# Patient Record
Sex: MALE | Race: WHITE | NOT HISPANIC OR LATINO | Employment: OTHER | URBAN - METROPOLITAN AREA
[De-identification: names, ages, dates, MRNs, and addresses within clinical notes are randomized per-mention and may not be internally consistent; named-entity substitution may affect disease eponyms.]

---

## 2017-01-04 ENCOUNTER — RADIANT APPOINTMENT (OUTPATIENT)
Dept: CT IMAGING | Facility: CLINIC | Age: 67
End: 2017-01-04
Payer: COMMERCIAL

## 2017-01-04 ENCOUNTER — OFFICE VISIT (OUTPATIENT)
Dept: FAMILY MEDICINE | Facility: CLINIC | Age: 67
End: 2017-01-04
Payer: COMMERCIAL

## 2017-01-04 VITALS
OXYGEN SATURATION: 96 % | RESPIRATION RATE: 12 BRPM | TEMPERATURE: 98.3 F | HEIGHT: 71 IN | BODY MASS INDEX: 32.13 KG/M2 | HEART RATE: 61 BPM | DIASTOLIC BLOOD PRESSURE: 70 MMHG | WEIGHT: 229.5 LBS | SYSTOLIC BLOOD PRESSURE: 137 MMHG

## 2017-01-04 DIAGNOSIS — R10.9 FLANK PAIN: Primary | ICD-10-CM

## 2017-01-04 DIAGNOSIS — R10.9 FLANK PAIN: ICD-10-CM

## 2017-01-04 DIAGNOSIS — Z11.59 NEED FOR HEPATITIS C SCREENING TEST: ICD-10-CM

## 2017-01-04 DIAGNOSIS — B02.9 HERPES ZOSTER WITHOUT COMPLICATION: ICD-10-CM

## 2017-01-04 LAB
ALBUMIN UR-MCNC: NEGATIVE MG/DL
APPEARANCE UR: CLEAR
BILIRUB UR QL STRIP: NEGATIVE
COLOR UR AUTO: YELLOW
GLUCOSE UR STRIP-MCNC: NEGATIVE MG/DL
HGB UR QL STRIP: NEGATIVE
KETONES UR STRIP-MCNC: NEGATIVE MG/DL
LEUKOCYTE ESTERASE UR QL STRIP: NEGATIVE
NITRATE UR QL: NEGATIVE
PH UR STRIP: 5.5 PH (ref 5–7)
SP GR UR STRIP: 1.02 (ref 1–1.03)
URN SPEC COLLECT METH UR: NORMAL
UROBILINOGEN UR STRIP-ACNC: 0.2 EU/DL (ref 0.2–1)

## 2017-01-04 PROCEDURE — 81003 URINALYSIS AUTO W/O SCOPE: CPT | Performed by: PHYSICIAN ASSISTANT

## 2017-01-04 PROCEDURE — 99214 OFFICE O/P EST MOD 30 MIN: CPT | Performed by: PHYSICIAN ASSISTANT

## 2017-01-04 PROCEDURE — 74176 CT ABD & PELVIS W/O CONTRAST: CPT | Mod: TC

## 2017-01-04 RX ORDER — HYDROCODONE BITARTRATE AND ACETAMINOPHEN 5; 325 MG/1; MG/1
1 TABLET ORAL EVERY 6 HOURS PRN
Qty: 15 TABLET | Refills: 0 | Status: SHIPPED | OUTPATIENT
Start: 2017-01-04 | End: 2017-02-22

## 2017-01-04 RX ORDER — KETOROLAC TROMETHAMINE 30 MG/ML
60 INJECTION, SOLUTION INTRAMUSCULAR; INTRAVENOUS ONCE
Qty: 2 ML | Refills: 0 | OUTPATIENT
Start: 2017-01-04 | End: 2017-01-04

## 2017-01-04 RX ORDER — VALACYCLOVIR HYDROCHLORIDE 1 G/1
1000 TABLET, FILM COATED ORAL 3 TIMES DAILY
Qty: 21 TABLET | Refills: 0 | Status: SHIPPED | OUTPATIENT
Start: 2017-01-04 | End: 2017-02-22

## 2017-01-04 NOTE — NURSING NOTE
The following medication was given:     MEDICATION: Ketorolac Tromethamine 60MG/2ML (30 mg/mL) (Toradol)  ROUTE: IM  SITE: [facility]  DOSE: 2ml  LOT #: [id number]  :  Jonathan Weber   EXPIRATION DATE:  06/01/2018  NDC#: 72939-477-10    Given at 10:35am

## 2017-01-04 NOTE — NURSING NOTE
"Chief Complaint   Patient presents with     Back Pain     Derm Problem       Initial /70 mmHg  Pulse 61  Temp(Src) 98.3  F (36.8  C) (Oral)  Resp 12  Ht 5' 11\" (1.803 m)  Wt 229 lb 8 oz (104.101 kg)  BMI 32.02 kg/m2  SpO2 96% Estimated body mass index is 32.02 kg/(m^2) as calculated from the following:    Height as of this encounter: 5' 11\" (1.803 m).    Weight as of this encounter: 229 lb 8 oz (104.101 kg).  BP completed using cuff size: vinay Buck CMA      "

## 2017-01-04 NOTE — PROGRESS NOTES
Quick Note:    Mr. Sandhu,    Your CT test was negative for an acute kidney stone.    Please contact the clinic if you have additional questions or if symptoms persist. Thank you.    Sincerely,    Paxton Burch       ______

## 2017-01-04 NOTE — PROGRESS NOTES
I spoke with patient over the phone about results and reviewed dermatomes,: it is also possible his pain is related to his shingles, which is in a L3/L4 pattern    Nick Burch PA-C

## 2017-01-04 NOTE — PROGRESS NOTES
SUBJECTIVE:                                                    Rick Sandhu is a 67 year old male who presents to clinic today for the following health issues:      Rash     Onset: 1 day ago      Description:   Location: right knee   Character: round, red  Itching (Pruritis): YES    Progression of Symptoms:  worsening    Accompanying Signs & Symptoms:  Fever: no   Body aches or joint pain: no   Sore throat symptoms: no   Recent cold symptoms: no    History:   Previous similar rash: no     Precipitating factors:   Exposure to similar rash: no   New exposures: None   Recent travel: no     Alleviating factors:  Nothing      Therapies Tried and outcome: nothing     Concern - right low back      Onset: 3 days ago      Description:  Sharp pain in low right back , very low down by glute, feels like prev kidney stone        Intensity: 8/10    Progression of Symptoms:  worsening and intermittent    Accompanying Signs & Symptoms:  No pain when urinating or no blood in urine   Unable to sleep due to pain      Previous history of similar problem:   Yes had a kidney stone about 16 years ago     Precipitating factors:   Worsened by: nothing     Alleviating factors:  Improved by: noting        Therapies Tried and outcome: aleve , aspirin   Doesn't check sugars               Allergies   Allergen Reactions     Penicillin G        Past Medical History   Diagnosis Date     Sarcoidosis (H) 2/96     Hypertension goal BP (blood pressure) < 140/90 9/1/2011     Type 2 diabetes, HbA1C goal < 8% (H) 10/20/2010     Hyperlipidemia LDL goal <100 10/20/2010         Current Outpatient Prescriptions on File Prior to Visit:  metFORMIN (GLUCOPHAGE) 500 MG tablet Take 1 tablet by mouth twice daily with meals   glipiZIDE (GLUCOTROL XL) 5 MG 24 hr tablet Take 1 tablet (5 mg) by mouth daily   lisinopril-hydrochlorothiazide (PRINZIDE,ZESTORETIC) 20-25 MG per tablet Take 1 tablet by mouth daily   atorvastatin (LIPITOR) 10 MG tablet Take 1 tablet (10  "mg) by mouth daily   Fiber Diet TABS Take 2 tablets by mouth daily    ASPIRIN 81 MG PO TABS 1 TABLET DAILY (*)   melatonin 3 MG tablet Take 3 mg by mouth nightly as needed.     No current facility-administered medications on file prior to visit.    Past Surgical History   Procedure Laterality Date     C appendectomy       Vasectomy  1982     Rotator cuff repair rt/lt  9/06     Left     Rotator cuff repair rt/lt  2008     Right     Rotator cuff repair rt/lt  2010     Right - redo     Other surgical history Left      Colonoscopy with co2 insufflation N/A 8/23/2016     Procedure: COLONOSCOPY WITH CO2 INSUFFLATION;  Surgeon: Duane, William Charles, MD;  Location:  OR       Social History     Social History     Marital Status:      Spouse Name: N/A     Number of Children: N/A     Years of Education: N/A     Occupational History     Not on file.     Social History Main Topics     Smoking status: Former Smoker     Quit date: 01/06/1986     Smokeless tobacco: Not on file     Alcohol Use: 0.0 oz/week     0 Standard drinks or equivalent per week      Comment: 1-2 drinks every other week     Drug Use: No     Sexual Activity: Yes     Other Topics Concern     Not on file     Social History Narrative       REVIEW OF SYSTEMS  General: negative for fever  SKIn as above  : negative for dysuria , incontinence, frequency  Musculoskeletal: as above  Neurologic: negative for ataxia, saddle anesthesia, fecal incontinence, one sided weakness,  paresthesias    Physical Exam:  Vitals: /70 mmHg  Pulse 61  Temp(Src) 98.3  F (36.8  C) (Oral)  Resp 12  Ht 5' 11\" (1.803 m)  Wt 229 lb 8 oz (104.101 kg)  BMI 32.02 kg/m2  SpO2 96%  BMI= Body mass index is 32.02 kg/(m^2).  Constitutional: healthy, alert and no acute distress   CARDIO: RRR, no MRG  RESP: lungs CTA, NAD  NEURO: Patellar reflexes intact and equal b/l  BACK:  Straight leg raise intact, + paraspinal muscle TTP just above rt hip laterally, strength intact and equal " b/l lower extremities, no CVAT,  ABD_ soft, nonttpo   GAIT: intact  Psychiatric: mentation appears normal and affect normal/bright  Skin_ rt medial mid leg with two patches of tiny vesicles on red bases      Impression:   UA WNL, stone vs back pain . CT shows nonobsctructive stone on rt, no obvious evidence for stone on CT    ICD-10-CM    1. Flank pain R10.9 ketorolac (TORADOL) 60 MG/2ML SOLN injection     UA reflex to Microscopic and Culture     CT Abdomen Pelvis w/o Contrast     HYDROcodone-acetaminophen (NORCO) 5-325 MG per tablet     CANCELED: UA reflex to Microscopic and Culture   2. Herpes zoster without complication B02.9 valACYclovir (VALTREX) 1000 mg tablet   3. Need for hepatitis C screening test Z11.59 Hepatitis C Screen Reflex to HCV RNA Quant and Genotype     CANCELED: Hepatitis C Screen Reflex to HCV RNA Quant and Genotype       Plan:  Instructions for back care and return precautions discussed.        Nick Burch PA-C

## 2017-01-04 NOTE — PATIENT INSTRUCTIONS
Shingles  Shingles is a viral infection caused by the same virus as chicken pox. Anyone who has had chicken pox may get shingles later in life. The virus stays in the body, but remains dormant (asleep). Shingles often occurs in older persons or persons with lowered immunity. But it can affect anyone at any age.  Shingles starts as a tingling patch of skin on one side of the body. Small, painful blisters may then appear. The rash does not spread to the rest of the body.  Exposure to shingles cannot cause shingles. However, it can cause chicken pox in anyone who has not had chicken pox or has not been vaccinated. The contagious period ends when all blisters have crusted over (generally about 2 weeks after the illness begins).  After the blisters heal, the affected skin may be sensitive or painful for months (neuralgia). This often gradually goes away.  A shingles vaccine is available. This can help prevent shingles or make it less painful. It is generally recommended for adults over the age of 60 who have had chicken pox in the past, but who have never had shingles. Adults over 60 who have had neither chicken pox nor shingles can prevent both diseases with the chicken pox vaccine. Ask your healthcare provider about these vaccines.  Home care    Medicines may be prescribed to help relieve pain. Take these medicines as directed. Ask your healthcare provider or pharmacist before using over-the-counter medicines for helping treat pain and itching.     In certain cases, antiviral medicines may be prescribed to reduce pain, shorten the illness, and prevent neuralgia. Take these medicines as directed.    Compresses made from a solution of cool water mixed with cornstarch or baking soda may help relieve pain and itching.     Gently wash skin daily with soap and water to help prevent infection.  Be certain to rinse off all of the soap, which can be irritating.    Trim fingernails and try not to scratch. Scratching the sores  may leave scars.    Stay home from work or school until all blisters have formed a crust and you are no longer contagious.  Follow-up care  Follow up with your healthcare provider or as directed by our staff.  When to seek medical advice    Fever of 100.4 F (38 C) or higher, or as directed by your healthcare provider    Affected skin is on the face or neck and any of the following occur:                          Headache                          Eye pain                          Changes in vision                          Sores near the eye                          Weakness of facial muscles    Pain, redness, or swelling of a joint    Signs of skin infection: colored drainage from the sores, warmth, increasing redness, or increasing pain    1092-1429 [employer]. [address] [place], [place] [address]. All rights reserved. This information is not intended as a substitute for professional medical care. Always follow your healthcare professional's instructions.        Flank Pain, Uncertain Cause  The flank is the area between your upper abdomen and your back. Pain there is often caused by a problem with your kidneys. It might be a kidney infection or a kidney stone. Other causes of flank pain include spinal arthritis, a pinched nerve from a back injury, or a back muscle strain or spasm.  The cause of your flank pain is not certain. You may need other tests.  Home care  Follow these tips when caring for yourself at home:    You may use acetaminophen or ibuprofen to control pain, unless your health care provider prescribed another medicine Note: If you have chronic liver or kidney disease, talk with your provider before taking these medicines. Also talk with your provider first if you ve had a stomach ulcer or GI bleeding.    If the cause of your pain is coming from the muscles, you may get relief with ice or heat: Ice: During the first 2 days after the injury, put an ice pack on the painful area for 20  minutes every 2 to 4 hours. This will reduce swelling and pain. A hot shower, hot bath, or heating pad works well for muscle spasm. You can start with ice, then switch to heat after 2 days. You might find that alternating ice and heat works well. Use the method that feels the best to you.  Follow-up care  Follow up with your health care provider. if your symptoms don t get better over the next few days.  When to seek medical advice  Call your health care provider right away if any of these happen:    Repeated vomiting    Fever of 100.4 F (38 C) or higher, or as directed by your health care provider    Flank pain that gets worse    Pain that spreads to the front of your belly (abdomen)    Dizziness, weakness, or fainting    Blood in your urine    Burning feeling when you urinate or the need to urinate often    Pain in one of your legs that gets worse    Numbness or weakness in a leg    6588-5143 [employer]. [address] [place] [address], [place] [address]. All rights reserved. This information is not intended as a substitute for professional medical care. Always follow your healthcare professional's instructions.

## 2017-01-04 NOTE — MR AVS SNAPSHOT
After Visit Summary   1/4/2017    Rick Sandhu    MRN: [id number]           Patient Information     Date Of Birth          1950        Visit Information        Provider Department      1/4/2017 9:00 AM Paxton Burch PA [facility]        Today's Diagnoses     Flank pain    -  1     Herpes zoster without complication         Need for hepatitis C screening test           Care Instructions      Shingles  Shingles is a viral infection caused by the same virus as chicken pox. Anyone who has had chicken pox may get shingles later in life. The virus stays in the body, but remains dormant (asleep). Shingles often occurs in older persons or persons with lowered immunity. But it can affect anyone at any age.  Shingles starts as a tingling patch of skin on one side of the body. Small, painful blisters may then appear. The rash does not spread to the rest of the body.  Exposure to shingles cannot cause shingles. However, it can cause chicken pox in anyone who has not had chicken pox or has not been vaccinated. The contagious period ends when all blisters have crusted over (generally about 2 weeks after the illness begins).  After the blisters heal, the affected skin may be sensitive or painful for months (neuralgia). This often gradually goes away.  A shingles vaccine is available. This can help prevent shingles or make it less painful. It is generally recommended for adults over the age of 60 who have had chicken pox in the past, but who have never had shingles. Adults over 60 who have had neither chicken pox nor shingles can prevent both diseases with the chicken pox vaccine. Ask your healthcare provider about these vaccines.  Home care    Medicines may be prescribed to help relieve pain. Take these medicines as directed. Ask your healthcare provider or pharmacist before using over-the-counter medicines for helping treat pain and itching.     In certain cases, antiviral  medicines may be prescribed to reduce pain, shorten the illness, and prevent neuralgia. Take these medicines as directed.    Compresses made from a solution of cool water mixed with cornstarch or baking soda may help relieve pain and itching.     Gently wash skin daily with soap and water to help prevent infection.  Be certain to rinse off all of the soap, which can be irritating.    Trim fingernails and try not to scratch. Scratching the sores may leave scars.    Stay home from work or school until all blisters have formed a crust and you are no longer contagious.  Follow-up care  Follow up with your healthcare provider or as directed by our staff.  When to seek medical advice    Fever of 100.4 F (38 C) or higher, or as directed by your healthcare provider    Affected skin is on the face or neck and any of the following occur:                          Headache                          Eye pain                          Changes in vision                          Sores near the eye                          Weakness of facial muscles    Pain, redness, or swelling of a joint    Signs of skin infection: colored drainage from the sores, warmth, increasing redness, or increasing pain    8299-5316 [employer]. [address] [place] [address]. All rights reserved. This information is not intended as a substitute for professional medical care. Always follow your healthcare professional's instructions.        Flank Pain, Uncertain Cause  The flank is the area between your upper abdomen and your back. Pain there is often caused by a problem with your kidneys. It might be a kidney infection or a kidney stone. Other causes of flank pain include spinal arthritis, a pinched nerve from a back injury, or a back muscle strain or spasm.  The cause of your flank pain is not certain. You may need other tests.  Home care  Follow these tips when caring for yourself at home:    You may use acetaminophen or ibuprofen  to control pain, unless your health care provider prescribed another medicine Note: If you have chronic liver or kidney disease, talk with your provider before taking these medicines. Also talk with your provider first if you ve had a stomach ulcer or GI bleeding.    If the cause of your pain is coming from the muscles, you may get relief with ice or heat: Ice: During the first 2 days after the injury, put an ice pack on the painful area for 20 minutes every 2 to 4 hours. This will reduce swelling and pain. A hot shower, hot bath, or heating pad works well for muscle spasm. You can start with ice, then switch to heat after 2 days. You might find that alternating ice and heat works well. Use the method that feels the best to you.  Follow-up care  Follow up with your health care provider. if your symptoms don t get better over the next few days.  When to seek medical advice  Call your health care provider right away if any of these happen:    Repeated vomiting    Fever of 100.4 F (38 C) or higher, or as directed by your health care provider    Flank pain that gets worse    Pain that spreads to the front of your belly (abdomen)    Dizziness, weakness, or fainting    Blood in your urine    Burning feeling when you urinate or the need to urinate often    Pain in one of your legs that gets worse    Numbness or weakness in a leg    2712-2316 [employer]. [address] [place] [address], [place] [address]. All rights reserved. This information is not intended as a substitute for professional medical care. Always follow your healthcare professional's instructions.              Follow-ups after your visit        Follow-up notes from your care team     Return if symptoms worsen or fail to improve.      Your next 10 appointments already scheduled     Jan 04, 2017 10:45 AM   CT ABDOMEN PELVIS W/O CONTRAST with BECT1   [facility]    [address] [facility] [address]  "  [phone]           Please bring any scans or X-rays taken at other hospitals, if similar tests were done. Also bring a list of your medicines, including vitamins, minerals and over-the-counter drugs. It is safest to leave personal items at home.  Be sure to tell your doctor:   If you have any allergies.   If there s any chance you are pregnant.   If you are breastfeeding.   If you have any special needs.  You do not need to do anything special to prepare.  Please wear loose clothing, such as a sweat suit or jogging clothes. Avoid snaps, zippers and other metal. We may ask you to undress and put on a hospital gown.              Future tests that were ordered for you today     Open Future Orders        Priority Expected Expires Ordered    CT Abdomen Pelvis w/o Contrast Routine  1/4/2018 1/4/2017    Hepatitis C Screen Reflex to HCV RNA Quant and Genotype Routine  1/4/2018 1/4/2017            Who to contact     If you have questions or need follow up information about today's clinic visit or your schedule please contact [facility] directly at [phone].  Normal or non-critical lab and imaging results will be communicated to you by "[email or web address]hart, letter or phone within 4 business days after the clinic has received the results. If you do not hear from us within 7 days, please contact the clinic through [employer] or phone. If you have a critical or abnormal lab result, we will notify you by phone as soon as possible.  Submit refill requests through [employer] or call your pharmacy and they will forward the refill request to us. Please allow 3 business days for your refill to be completed.          Additional Information About Your Visit        [employer] Information     [employer] lets you send messages to your doctor, view your test results, renew your prescriptions, schedule appointments and more. To sign up, go to [email or web address] . Click on \"Log in\" on the left side of the screen, which will take " "you to the Welcome page. Then click on \"Sign up Now\" on the right side of the page.     You will be asked to enter the access code listed below, as well as some personal information. Please follow the directions to create your username and password.     Your access code is: [email or web address]  Expires: 2017 10:02 AM     Your access code will  in 90 days. If you need help or a new code, please call your [facility] clinic or [phone].        Care EveryWhere ID     This is your Care EveryWhere ID. This could be used by other organizations to access your [facility] medical records  [id number]        Your Vitals Were     Pulse Temperature Respirations Height BMI (Body Mass Index) Pulse Oximetry    61 98.3  F (36.8  C) (Oral) 12 5' 11\" (1.803 m) 32.02 kg/m2 96%       Blood Pressure from Last 3 Encounters:   17 137/70   16 142/72   16 121/71    Weight from Last 3 Encounters:   17 229 lb 8 oz (104.101 kg)   16 226 lb (102.513 kg)   16 220 lb (99.791 kg)              We Performed the Following     UA reflex to Microscopic and Culture          Today's Medication Changes          These changes are accurate as of: 17 10:02 AM.  If you have any questions, ask your nurse or doctor.               Start taking these medicines.        Dose/Directions    HYDROcodone-acetaminophen 5-325 MG per tablet   Commonly known as:  NORCO   Used for:  Flank pain   Started by:  Paxton Burch PA        Dose:  1 tablet   Take 1 tablet by mouth every 6 hours as needed for moderate to severe pain   Quantity:  15 tablet   Refills:  0       ketorolac 60 MG/2ML Soln injection   Commonly known as:  TORADOL   Used for:  Flank pain   Started by:  Paxton Burch PA        Dose:  60 mg   Inject 2 mLs (60 mg) into the muscle once for 1 dose In clinic IM INJ   Quantity:  2 mL   Refills:  0       valACYclovir 1000 mg tablet   Commonly known as:  VALTREX   Used for:  Herpes zoster without " complication   Started by:  Paxton Burch PA        Dose:  1000 mg   Take 1 tablet (1,000 mg) by mouth 3 times daily   Quantity:  21 tablet   Refills:  0            Where to get your medicines      These medications were sent to [facility] [address] [facility] [place] - [address] [facility]  [address] [place] [address]     Phone:  [phone]    - valACYclovir 1000 mg tablet      Some of these will need a paper prescription and others can be bought over the counter.  Ask your nurse if you have questions.     Bring a paper prescription for each of these medications    - HYDROcodone-acetaminophen 5-325 MG per tablet    You don't need a prescription for these medications    - ketorolac 60 MG/2ML Soln injection             Primary Care Provider Office Phone # Fax #    Keila Muñoz [phone]       [address] [address]        Thank you!     Thank you for choosing [facility]  for your care. Our goal is always to provide you with excellent care. Hearing back from our patients is one way we can continue to improve our services. Please take a few minutes to complete the written survey that you may receive in the mail after your visit with us. Thank you!             Your Updated Medication List - Protect others around you: Learn how to safely use, store and throw away your medicines at www.disposemymeds.org.          This list is accurate as of: 1/4/17 10:02 AM.  Always use your most recent med list.                   Brand Name Dispense Instructions for use    aspirin 81 MG tablet      1 TABLET DAILY (*)       atorvastatin 10 MG tablet    LIPITOR    90 tablet    Take 1 tablet (10 mg) by mouth daily       FIBER DIET Tabs      Take 2 tablets by mouth daily       glipiZIDE 5 MG 24 hr tablet    GLUCOTROL XL    90 tablet    Take 1 tablet (5 mg) by mouth daily       HYDROcodone-acetaminophen 5-325 MG per  tablet    NORCO    15 tablet    Take 1 tablet by mouth every 6 hours as needed for moderate to severe pain       ketorolac 60 MG/2ML Soln injection    TORADOL    2 mL    Inject 2 mLs (60 mg) into the muscle once for 1 dose In clinic IM INJ       lisinopril-hydrochlorothiazide 20-25 MG per tablet    PRINZIDE/ZESTORETIC    90 tablet    Take 1 tablet by mouth daily       melatonin 3 MG tablet      Take 3 mg by mouth nightly as needed.       metFORMIN 500 MG tablet    GLUCOPHAGE    180 tablet    Take 1 tablet by mouth twice daily with meals       valACYclovir 1000 mg tablet    VALTREX    21 tablet    Take 1 tablet (1,000 mg) by mouth 3 times daily

## 2017-01-11 ENCOUNTER — TELEPHONE (OUTPATIENT)
Dept: FAMILY MEDICINE | Facility: CLINIC | Age: 67
End: 2017-01-11

## 2017-01-11 DIAGNOSIS — B02.9 HERPES ZOSTER WITHOUT COMPLICATION: Primary | ICD-10-CM

## 2017-01-11 RX ORDER — HYDROCODONE BITARTRATE AND ACETAMINOPHEN 5; 325 MG/1; MG/1
1 TABLET ORAL EVERY 6 HOURS PRN
Qty: 15 TABLET | Refills: 0 | Status: SHIPPED | OUTPATIENT
Start: 2017-01-11 | End: 2017-02-22

## 2017-01-11 NOTE — TELEPHONE ENCOUNTER
Reason for Call:  Other     Detailed comments: Patient was seen on 1/4/2017 and was diagnosed with shingles. Pt still has pain especially in his leg that keeps him up at night. Still has tenderness from below his navel to his back. The sores have crusted over on his abd. He does not have anymore Norco for the pain,and also wondering how long will he have this?     [facility] [address] [place] [id number] [facility]    Phone Number Patient can be reached at: Cell number on file:    Telephone Information:   Mobile [phone]       Best Time: any    Can we leave a detailed message on this number? YES    Call taken on 1/11/2017 at 3:29 PM by Taylor Ramirez

## 2017-01-11 NOTE — TELEPHONE ENCOUNTER
It is hard to say exactly but could be anywhere from a few more days to a few more weeks. I will wrist an Rx for some more pain meds for him to use if necessary.    Nick Burch PA-C

## 2017-01-12 NOTE — TELEPHONE ENCOUNTER
Called and left msg for pt to  the written rx from Nick Burch PA-C at the  and remember to bring a photo ID.  Blair Bhardwaj,  [facility]

## 2017-02-22 ENCOUNTER — OFFICE VISIT (OUTPATIENT)
Dept: FAMILY MEDICINE | Facility: CLINIC | Age: 67
End: 2017-02-22
Payer: COMMERCIAL

## 2017-02-22 VITALS
TEMPERATURE: 98.4 F | DIASTOLIC BLOOD PRESSURE: 76 MMHG | SYSTOLIC BLOOD PRESSURE: 136 MMHG | BODY MASS INDEX: 31.19 KG/M2 | WEIGHT: 223.6 LBS | OXYGEN SATURATION: 96 % | HEART RATE: 60 BPM

## 2017-02-22 DIAGNOSIS — Z23 NEED FOR PNEUMOCOCCAL VACCINE: ICD-10-CM

## 2017-02-22 DIAGNOSIS — I10 ESSENTIAL HYPERTENSION WITH GOAL BLOOD PRESSURE LESS THAN 140/90: ICD-10-CM

## 2017-02-22 DIAGNOSIS — E78.5 HYPERLIPIDEMIA LDL GOAL <100: ICD-10-CM

## 2017-02-22 DIAGNOSIS — Z11.59 NEED FOR HEPATITIS C SCREENING TEST: ICD-10-CM

## 2017-02-22 DIAGNOSIS — E11.9 TYPE 2 DIABETES MELLITUS WITHOUT COMPLICATION, WITHOUT LONG-TERM CURRENT USE OF INSULIN (H): Primary | ICD-10-CM

## 2017-02-22 DIAGNOSIS — Z23 NEED FOR VACCINE FOR TD (TETANUS-DIPHTHERIA): ICD-10-CM

## 2017-02-22 DIAGNOSIS — Z23 NEED FOR PROPHYLACTIC VACCINATION AND INOCULATION AGAINST INFLUENZA: ICD-10-CM

## 2017-02-22 LAB — HBA1C MFR BLD: 7.4 % (ref 4.3–6)

## 2017-02-22 PROCEDURE — 83036 HEMOGLOBIN GLYCOSYLATED A1C: CPT | Performed by: FAMILY MEDICINE

## 2017-02-22 PROCEDURE — 36415 COLL VENOUS BLD VENIPUNCTURE: CPT | Performed by: FAMILY MEDICINE

## 2017-02-22 PROCEDURE — G0008 ADMIN INFLUENZA VIRUS VAC: HCPCS | Performed by: FAMILY MEDICINE

## 2017-02-22 PROCEDURE — G0009 ADMIN PNEUMOCOCCAL VACCINE: HCPCS | Performed by: FAMILY MEDICINE

## 2017-02-22 PROCEDURE — 99214 OFFICE O/P EST MOD 30 MIN: CPT | Mod: 25 | Performed by: FAMILY MEDICINE

## 2017-02-22 PROCEDURE — 90670 PCV13 VACCINE IM: CPT | Performed by: FAMILY MEDICINE

## 2017-02-22 PROCEDURE — 86803 HEPATITIS C AB TEST: CPT | Performed by: FAMILY MEDICINE

## 2017-02-22 PROCEDURE — 90472 IMMUNIZATION ADMIN EACH ADD: CPT | Performed by: FAMILY MEDICINE

## 2017-02-22 PROCEDURE — 90662 IIV NO PRSV INCREASED AG IM: CPT | Performed by: FAMILY MEDICINE

## 2017-02-22 PROCEDURE — 90715 TDAP VACCINE 7 YRS/> IM: CPT | Performed by: FAMILY MEDICINE

## 2017-02-22 PROCEDURE — 80053 COMPREHEN METABOLIC PANEL: CPT | Performed by: FAMILY MEDICINE

## 2017-02-22 RX ORDER — GLIPIZIDE 5 MG/1
5 TABLET, FILM COATED, EXTENDED RELEASE ORAL DAILY
Qty: 90 TABLET | Refills: 1 | Status: SHIPPED | OUTPATIENT
Start: 2017-02-22 | End: 2017-08-21

## 2017-02-22 RX ORDER — LISINOPRIL AND HYDROCHLOROTHIAZIDE 20; 25 MG/1; MG/1
1 TABLET ORAL DAILY
Qty: 90 TABLET | Refills: 1 | Status: SHIPPED | OUTPATIENT
Start: 2017-02-22 | End: 2017-08-28

## 2017-02-22 RX ORDER — ATORVASTATIN CALCIUM 10 MG/1
10 TABLET, FILM COATED ORAL DAILY
Qty: 90 TABLET | Refills: 1 | Status: SHIPPED | OUTPATIENT
Start: 2017-02-22 | End: 2017-08-28

## 2017-02-22 NOTE — MR AVS SNAPSHOT
After Visit Summary   2/22/2017    Rick Sandhu    MRN: [id number]           Patient Information     Date Of Birth          1950        Visit Information        Provider Department      2/22/2017 4:20 PM Keila Muñoz MD [facility]        Today's Diagnoses     Type 2 diabetes mellitus without complication, without long-term current use of insulin (H)    -  1    Essential hypertension with goal blood pressure less than 140/90        Hyperlipidemia LDL goal <100        Need for hepatitis C screening test        Need for vaccine for TD (tetanus-diphtheria)        Need for pneumococcal vaccine        Need for prophylactic vaccination and inoculation against influenza           Follow-ups after your visit        Follow-up notes from your care team     Return in about 6 months (around 8/22/2017).      Your next 10 appointments already scheduled     Mar 17, 2017  2:30 PM CDT   [facility] Visit with Man Bedolla MD   [facility]    [address] [facility] [address] [facility] [id number] [phone]              Who to contact     If you have questions or need follow up information about today's clinic visit or your schedule please contact [facility] directly at [phone].  Normal or non-critical lab and imaging results will be communicated to you by MyChart, letter or phone within 4 business days after the clinic has received the results. If you do not hear from us within 7 days, please contact the clinic through MyChart or phone. If you have a critical or abnormal lab result, we will notify you by phone as soon as possible.  Submit refill requests through Fanzter or call your pharmacy and they will forward the refill request to us. Please allow 3 business days for your refill to be completed.          Additional Information About Your Visit        MyChart Information     Fanzter gives you secure access to your electronic health  record. If you see a primary care provider, you can also send messages to your care team and make appointments. If you have questions, please call your primary care clinic.  If you do not have a primary care provider, please call [phone] and they will assist you.        Care EveryWhere ID     This is your Care EveryWhere ID. This could be used by other organizations to access your [place] medical records  [id number]        Your Vitals Were     Pulse Temperature Pulse Oximetry BMI (Body Mass Index)          60 98.4  F (36.9  C) (Oral) 96% 31.19 kg/m2         Blood Pressure from Last 3 Encounters:   02/22/17 136/76   01/04/17 137/70   08/24/16 142/72    Weight from Last 3 Encounters:   02/22/17 101.4 kg (223 lb 9.6 oz)   01/04/17 104.1 kg (229 lb 8 oz)   08/24/16 102.5 kg (226 lb)              We Performed the Following     Comprehensive metabolic panel     EA ADD'L VACCINE     FLU VACCINE, INCREASED ANTIGEN, PRESV FREE, AGE 65+ [39128]     Hemoglobin A1c     Hepatitis C antibody     PNEUMOCOCCAL CONJ VACCINE 13 VALENT IM (PREVNAR 13)     TD (ADULT, 7+) PRESERVE FREE     Vaccine Administration, Initial [65452]          Where to get your medicines      These medications were sent to [facility] [address] [place] [facility] [address] [facility]  [address] [facility] [address]     Phone:  [phone]     atorvastatin 10 MG tablet    glipiZIDE 5 MG 24 hr tablet    lisinopril-hydrochlorothiazide 20-25 MG per tablet    metFORMIN 500 MG tablet          Primary Care Provider Office Phone # Fax #    Keila Muñoz [phone]       [address] [address]        Thank you!     Thank you for choosing [facility]  for your care. Our goal is always to provide you with excellent care. Hearing back from our patients is one way we can continue to improve our services. Please take a few minutes to complete the written survey  that you may receive in the mail after your visit with us. Thank you!             Your Updated Medication List - Protect others around you: Learn how to safely use, store and throw away your medicines at www.disposemymeds.org.          This list is accurate as of: 2/22/17 11:59 PM.  Always use your most recent med list.                   Brand Name Dispense Instructions for use    aspirin 81 MG tablet      1 TABLET DAILY (*)       atorvastatin 10 MG tablet    LIPITOR    90 tablet    Take 1 tablet (10 mg) by mouth daily       FIBER DIET Tabs      Take 2 tablets by mouth daily       glipiZIDE 5 MG 24 hr tablet    GLUCOTROL XL    90 tablet    Take 1 tablet (5 mg) by mouth daily       lisinopril-hydrochlorothiazide 20-25 MG per tablet    PRINZIDE/ZESTORETIC    90 tablet    Take 1 tablet by mouth daily       melatonin 3 MG tablet      Take 3 mg by mouth nightly as needed Reported on 2/22/2017       metFORMIN 500 MG tablet    GLUCOPHAGE    180 tablet    Take 1 tablet by mouth twice daily with meals

## 2017-02-22 NOTE — NURSING NOTE
"Chief Complaint   Patient presents with     RECHECK     diabetes       Initial /76 (BP Location: Right arm, Patient Position: Chair, Cuff Size: Adult Regular)  Pulse 60  Temp 98.4  F (36.9  C) (Oral)  Wt 101.4 kg (223 lb 9.6 oz)  SpO2 96%  BMI 31.19 kg/m2 Estimated body mass index is 31.19 kg/(m^2) as calculated from the following:    Height as of 1/4/17: 1.803 m (5' 11\").    Weight as of this encounter: 101.4 kg (223 lb 9.6 oz).  Medication Reconciliation: complete   Lindy Aldridge CMA      "

## 2017-02-22 NOTE — PROGRESS NOTES
SUBJECTIVE:                                                    Rick Sandhu is a 67 year old male who presents to clinic today for the following health issues:    Diabetes Follow-up      Patient is checking blood sugars: not at all    Diabetic concerns: None     Symptoms of hypoglycemia (low blood sugar): none     Paresthesias (numbness or burning in feet) or sores: No     Date of last diabetic eye exam: February 2016       Amount of exercise or physical activity: 4-5 days/week for an average of 45-60 minutes    Problems taking medications regularly: No    Medication side effects: dry mouth  Diet: regular (no restrictions)    SUBJECTIVE:  Here in follow up diabetes, hypertension, lipids.  Reviewed interval history.  Doing well.  Reports no interval health concerns.      Review of systems otherwise negative.  Past medical, family, and social history reviewed and updated in chart.    OBJECTIVE:  /76 (BP Location: Right arm, Patient Position: Chair, Cuff Size: Adult Regular)  Pulse 60  Temp 98.4  F (36.9  C) (Oral)  Wt 101.4 kg (223 lb 9.6 oz)  SpO2 96%  BMI 31.19 kg/m2  Alert, pleasant, upbeat, and in no apparent discomfort.  S1 and S2 normal, no murmurs, clicks, gallops or rubs. Regular rate and rhythm. Chest is clear; no wheezes or rales. No edema or JVD.   Past labs reviewed with the patient.     ASSESSMENT / PLAN:  (E11.9) Type 2 diabetes mellitus without complication, without long-term current use of insulin (H)  (primary encounter diagnosis)  Comment: has been under decent control - A1c 7+ - recheck and adjust as needed   Plan: metFORMIN (GLUCOPHAGE) 500 MG tablet, glipiZIDE        (GLUCOTROL XL) 5 MG 24 hr tablet, Hemoglobin         A1c, Comprehensive metabolic panel            (I10) Essential hypertension with goal blood pressure less than 140/90  Comment: controlled - continue   Plan: lisinopril-hydrochlorothiazide         (PRINZIDE/ZESTORETIC) 20-25 MG per tablet            (E78.5) Hyperlipidemia  LDL goal <100  Comment: recheck and adjust   Plan: atorvastatin (LIPITOR) 10 MG tablet            (Z11.59) Need for hepatitis C screening test  Comment:   Plan: Hepatitis C antibody            (Z23) Need for vaccine for TD (tetanus-diphtheria)  Comment:   Plan: TD (ADULT, 7+) PRESERVE FREE, EA ADD'L VACCINE            (Z23) Need for pneumococcal vaccine  Comment:   Plan: PNEUMOCOCCAL CONJ VACCINE 13 VALENT IM (PREVNAR        13), EA ADD'L VACCINE            (Z23) Need for prophylactic vaccination and inoculation against influenza  Comment:   Plan: FLU VACCINE, INCREASED ANTIGEN, PRESV FREE, AGE        65+ [99221], Vaccine Administration, Initial         [46694]            Follow up 6 months or based upon results   MARIELLE Muñoz MD    (Chart documentation completed in part with Dragon voice-recognition software.  Even though reviewed some grammatical, spelling, and word errors may remain.)       Injectable Influenza Immunization Documentation    1.  Is the person to be vaccinated sick today?  No    2. Does the person to be vaccinated have an allergy to eggs or to a component of the vaccine?  No    3. Has the person to be vaccinated today ever had a serious reaction to influenza vaccine in the past?  No    4. Has the person to be vaccinated ever had Guillain-[place] syndrome?  No     Form completed by Lindy Aldridge CMA

## 2017-02-23 LAB
ALBUMIN SERPL-MCNC: 4.4 G/DL (ref 3.4–5)
ALP SERPL-CCNC: 51 U/L (ref 40–150)
ALT SERPL W P-5'-P-CCNC: 53 U/L (ref 0–70)
ANION GAP SERPL CALCULATED.3IONS-SCNC: 7 MMOL/L (ref 3–14)
AST SERPL W P-5'-P-CCNC: 37 U/L (ref 0–45)
BILIRUB SERPL-MCNC: 0.9 MG/DL (ref 0.2–1.3)
BUN SERPL-MCNC: 15 MG/DL (ref 7–30)
CALCIUM SERPL-MCNC: 9.6 MG/DL (ref 8.5–10.1)
CHLORIDE SERPL-SCNC: 99 MMOL/L (ref 94–109)
CO2 SERPL-SCNC: 30 MMOL/L (ref 20–32)
CREAT SERPL-MCNC: 0.83 MG/DL (ref 0.66–1.25)
GFR SERPL CREATININE-BSD FRML MDRD: ABNORMAL ML/MIN/1.7M2
GLUCOSE SERPL-MCNC: 116 MG/DL (ref 70–99)
HCV AB SERPL QL IA: NORMAL
POTASSIUM SERPL-SCNC: 4.3 MMOL/L (ref 3.4–5.3)
PROT SERPL-MCNC: 7.6 G/DL (ref 6.8–8.8)
SODIUM SERPL-SCNC: 136 MMOL/L (ref 133–144)

## 2017-03-17 ENCOUNTER — OFFICE VISIT (OUTPATIENT)
Dept: OPHTHALMOLOGY | Facility: CLINIC | Age: 67
End: 2017-03-17
Payer: COMMERCIAL

## 2017-03-17 DIAGNOSIS — L71.9 ROSACEA: ICD-10-CM

## 2017-03-17 DIAGNOSIS — E11.9 TYPE 2 DIABETES MELLITUS WITHOUT COMPLICATION, WITHOUT LONG-TERM CURRENT USE OF INSULIN (H): Primary | ICD-10-CM

## 2017-03-17 DIAGNOSIS — H26.9 CATARACT: ICD-10-CM

## 2017-03-17 DIAGNOSIS — H52.4 PRESBYOPIA: ICD-10-CM

## 2017-03-17 DIAGNOSIS — H43.813 POSTERIOR VITREOUS DETACHMENT, BILATERAL: ICD-10-CM

## 2017-03-17 PROCEDURE — 92014 COMPRE OPH EXAM EST PT 1/>: CPT | Performed by: OPHTHALMOLOGY

## 2017-03-17 PROCEDURE — 92015 DETERMINE REFRACTIVE STATE: CPT | Performed by: OPHTHALMOLOGY

## 2017-03-17 ASSESSMENT — VISUAL ACUITY
OS_CC: 20/20-2
OD_CC: 20/20-2
OD_CC: J1
OS_CC: J1
METHOD: SNELLEN - LINEAR
CORRECTION_TYPE: GLASSES

## 2017-03-17 ASSESSMENT — REFRACTION_MANIFEST
OS_SPHERE: -5.75
OS_AXIS: 092
OS_CYLINDER: +2.50
OD_CYLINDER: +1.50
OD_SPHERE: -5.50
OD_AXIS: 070
OD_ADD: +3.00
OS_ADD: +3.00

## 2017-03-17 ASSESSMENT — SLIT LAMP EXAM - LIDS
COMMENTS: 1+ DERMATOCHALASIS - UPPER LID, 1+ MEIBOMIAN GLAND DYSFUNCTION, 1+ SCLERAL SHOW
COMMENTS: 1+ DERMATOCHALASIS - UPPER LID, 1+ MEIBOMIAN GLAND DYSFUNCTION, 1+ SCLERAL SHOW

## 2017-03-17 ASSESSMENT — REFRACTION_WEARINGRX
OS_SPHERE: -5.50
OS_AXIS: 090
OD_SPHERE: -5.50
SPECS_TYPE: PAL
OD_ADD: +2.75
OD_CYLINDER: +2.00
OD_AXIS: 075
OS_ADD: +2.75
OS_CYLINDER: +2.50

## 2017-03-17 ASSESSMENT — TONOMETRY
OD_IOP_MMHG: 18
IOP_METHOD: APPLANATION
OS_IOP_MMHG: 18

## 2017-03-17 ASSESSMENT — EXTERNAL EXAM - RIGHT EYE: OD_EXAM: 2+ BROW PTOSIS, ROSACEA

## 2017-03-17 ASSESSMENT — CONF VISUAL FIELD
OS_NORMAL: 1
OD_NORMAL: 1

## 2017-03-17 ASSESSMENT — CUP TO DISC RATIO
OD_RATIO: 0.3
OS_RATIO: 0.3

## 2017-03-17 ASSESSMENT — EXTERNAL EXAM - LEFT EYE: OS_EXAM: 2+ BROW PTOSIS, ROSACEA

## 2017-03-17 NOTE — MR AVS SNAPSHOT
After Visit Summary   3/17/2017    Rick Sandhu    MRN: [id number]           Patient Information     Date Of Birth          1950        Visit Information        Provider Department      3/17/2017 2:30 PM Man Bedolla MD [facility]        Today's Diagnoses     Presbyopia    -  1      Care Instructions    Glasses Rx given -optional   Call in November 2017 for an appointment in March 2018 for Complete Exam    Dr. Bedolla [phone]    Diabetes weakens the blood vessels all over the body, including the eyes. Damage to the blood vessels in the eyes can cause swelling or bleeding into part of the eye (called the retina). This is called diabetic retinopathy (MEGAN-tin-AH-puh-thee). If not treated, this disease can cause vision loss or blindness.   Symptoms may include blurred or distorted vision, but many people have no symptoms. It's important to see your eye doctor regularly to check for problems.   Early treatment and good control can help protect your vision. Here are the things you can do to help prevent vision loss:      1. Keep your blood sugar levels under tight control.      2. Bring high blood pressure under control.      3. No smoking.      4. Have yearly dilated eye exams.          Follow-ups after your visit        Who to contact     If you have questions or need follow up information about today's clinic visit or your schedule please contact [facility] directly at [phone].  Normal or non-critical lab and imaging results will be communicated to you by MyChart, letter or phone within 4 business days after the clinic has received the results. If you do not hear from us within 7 days, please contact the clinic through MyChart or phone. If you have a critical or abnormal lab result, we will notify you by phone as soon as possible.  Submit refill requests through Powervation or call your pharmacy and they will forward the refill request to us. Please allow 3  business days for your refill to be completed.          Additional Information About Your Visit        MyChart Information     [employer] gives you secure access to your electronic health record. If you see a primary care provider, you can also send messages to your care team and make appointments. If you have questions, please call your primary care clinic.  If you do not have a primary care provider, please call [phone] and they will assist you.        Care EveryWhere ID     This is your Care EveryWhere ID. This could be used by other organizations to access your [place] medical records  [id number]         Blood Pressure from Last 3 Encounters:   02/22/17 136/76   01/04/17 137/70   08/24/16 142/72    Weight from Last 3 Encounters:   02/22/17 101.4 kg (223 lb 9.6 oz)   01/04/17 104.1 kg (229 lb 8 oz)   08/24/16 102.5 kg (226 lb)              Today, you had the following     No orders found for display       Primary Care Provider Office Phone # Fax #    Keila Donn Muñoz [phone]       [place] [id number]        Thank you!     Thank you for choosing [facility]  for your care. Our goal is always to provide you with excellent care. Hearing back from our patients is one way we can continue to improve our services. Please take a few minutes to complete the written survey that you may receive in the mail after your visit with us. Thank you!             Your Updated Medication List - Protect others around you: Learn how to safely use, store and throw away your medicines at www.disposemymeds.org.          This list is accurate as of: 3/17/17  3:50 PM.  Always use your most recent med list.                   Brand Name Dispense Instructions for use    aspirin 81 MG tablet      1 TABLET DAILY (*)       atorvastatin 10 MG tablet    LIPITOR    90 tablet    Take 1 tablet (10 mg) by mouth daily       FIBER DIET Tabs      Take 2  tablets by mouth daily       glipiZIDE 5 MG 24 hr tablet    GLUCOTROL XL    90 tablet    Take 1 tablet (5 mg) by mouth daily       lisinopril-hydrochlorothiazide 20-25 MG per tablet    PRINZIDE/ZESTORETIC    90 tablet    Take 1 tablet by mouth daily       melatonin 3 MG tablet      Take 3 mg by mouth nightly as needed Reported on 2/22/2017       metFORMIN 500 MG tablet    GLUCOPHAGE    180 tablet    Take 1 tablet by mouth twice daily with meals

## 2017-03-17 NOTE — PATIENT INSTRUCTIONS
Glasses Rx given -optional   Call in November 2017 for an appointment in March 2018 for Complete Exam    Dr. Bedolla [phone]    Diabetes weakens the blood vessels all over the body, including the eyes. Damage to the blood vessels in the eyes can cause swelling or bleeding into part of the eye (called the retina). This is called diabetic retinopathy (MEGAN-tin-AH-puh-thee). If not treated, this disease can cause vision loss or blindness.   Symptoms may include blurred or distorted vision, but many people have no symptoms. It's important to see your eye doctor regularly to check for problems.   Early treatment and good control can help protect your vision. Here are the things you can do to help prevent vision loss:      1. Keep your blood sugar levels under tight control.      2. Bring high blood pressure under control.      3. No smoking.      4. Have yearly dilated eye exams.

## 2017-03-19 PROBLEM — H26.9 CATARACT: Status: ACTIVE | Noted: 2017-03-19

## 2017-08-21 DIAGNOSIS — E11.9 TYPE 2 DIABETES MELLITUS WITHOUT COMPLICATION, WITHOUT LONG-TERM CURRENT USE OF INSULIN (H): ICD-10-CM

## 2017-08-21 NOTE — TELEPHONE ENCOUNTER
glipiZIDE (GLUCOTROL XL) 5 MG 24 hr tablet         Last Written Prescription Date: 2/22/17  Last Fill Quantity: 90, # refills: 1  [facility] prescribing provider:  2/22/17 Dr. Muñoz   Next 5 appointments (look out 90 days)     Aug 28, 2017 11:00 AM CDT   Office Visit with Keila Muñoz MD   [facility] ([facility])    [address] [place] [address] [phone]                   BP Readings from Last 3 Encounters:   02/22/17 136/76   01/04/17 137/70   08/24/16 142/72     Lab Results   Component Value Date    MICROL 9 08/19/2016     Lab Results   Component Value Date    UMALCR 4.42 08/19/2016     Creatinine   Date Value Ref Range Status   02/22/2017 0.83 0.66 - 1.25 mg/dL Final   ]  GFR Estimate   Date Value Ref Range Status   02/22/2017 >90  Non  GFR Calc   >60 mL/min/1.7m2 Final   08/19/2016 89 >60 mL/min/1.7m2 Final     Comment:     Non  GFR Calc   02/17/2016 >90  Non  GFR Calc   >60 mL/min/1.7m2 Final     GFR Estimate If Black   Date Value Ref Range Status   02/22/2017 >90   GFR Calc   >60 mL/min/1.7m2 Final   08/19/2016 >90   GFR Calc   >60 mL/min/1.7m2 Final   02/17/2016 >90   GFR Calc   >60 mL/min/1.7m2 Final     Lab Results   Component Value Date    CHOL 144 08/19/2016     Lab Results   Component Value Date    HDL 37 08/19/2016     Lab Results   Component Value Date    LDL 56 08/19/2016     Lab Results   Component Value Date    TRIG 253 08/19/2016     Lab Results   Component Value Date    CHOLHDLRATIO 3.6 07/08/2015     Lab Results   Component Value Date    AST 37 02/22/2017     Lab Results   Component Value Date    ALT 53 02/22/2017     Lab Results   Component Value Date    A1C 7.4 02/22/2017    A1C 7.3 08/19/2016    A1C 7.2 02/17/2016    A1C 6.7 07/08/2015    A1C 9.0 11/26/2014     Potassium   Date Value Ref Range  Status   02/22/2017 4.3 3.4 - 5.3 mmol/L Final

## 2017-08-22 RX ORDER — GLIPIZIDE 5 MG/1
5 TABLET, FILM COATED, EXTENDED RELEASE ORAL DAILY
Qty: 30 TABLET | Refills: 0 | Status: SHIPPED | OUTPATIENT
Start: 2017-08-22 | End: 2017-08-28

## 2017-08-28 ENCOUNTER — OFFICE VISIT (OUTPATIENT)
Dept: FAMILY MEDICINE | Facility: CLINIC | Age: 67
End: 2017-08-28
Payer: COMMERCIAL

## 2017-08-28 VITALS
DIASTOLIC BLOOD PRESSURE: 74 MMHG | HEIGHT: 71 IN | TEMPERATURE: 98.4 F | HEART RATE: 62 BPM | BODY MASS INDEX: 31.36 KG/M2 | WEIGHT: 224 LBS | OXYGEN SATURATION: 96 % | RESPIRATION RATE: 16 BRPM | SYSTOLIC BLOOD PRESSURE: 126 MMHG

## 2017-08-28 DIAGNOSIS — E78.5 HYPERLIPIDEMIA LDL GOAL <100: ICD-10-CM

## 2017-08-28 DIAGNOSIS — I10 ESSENTIAL HYPERTENSION WITH GOAL BLOOD PRESSURE LESS THAN 140/90: ICD-10-CM

## 2017-08-28 DIAGNOSIS — E11.9 TYPE 2 DIABETES MELLITUS WITHOUT COMPLICATION, WITHOUT LONG-TERM CURRENT USE OF INSULIN (H): Primary | ICD-10-CM

## 2017-08-28 LAB
ALBUMIN SERPL-MCNC: 3.9 G/DL (ref 3.4–5)
ALP SERPL-CCNC: 48 U/L (ref 40–150)
ALT SERPL W P-5'-P-CCNC: 42 U/L (ref 0–70)
ANION GAP SERPL CALCULATED.3IONS-SCNC: 8 MMOL/L (ref 3–14)
AST SERPL W P-5'-P-CCNC: 22 U/L (ref 0–45)
BILIRUB SERPL-MCNC: 1 MG/DL (ref 0.2–1.3)
BUN SERPL-MCNC: 16 MG/DL (ref 7–30)
CALCIUM SERPL-MCNC: 8.9 MG/DL (ref 8.5–10.1)
CHLORIDE SERPL-SCNC: 100 MMOL/L (ref 94–109)
CHOLEST SERPL-MCNC: 150 MG/DL
CO2 SERPL-SCNC: 26 MMOL/L (ref 20–32)
CREAT SERPL-MCNC: 0.84 MG/DL (ref 0.66–1.25)
CREAT UR-MCNC: 87 MG/DL
GFR SERPL CREATININE-BSD FRML MDRD: >90 ML/MIN/1.7M2
GLUCOSE SERPL-MCNC: 137 MG/DL (ref 70–99)
HBA1C MFR BLD: 7.2 % (ref 4.3–6)
HDLC SERPL-MCNC: 38 MG/DL
LDLC SERPL CALC-MCNC: 54 MG/DL
MICROALBUMIN UR-MCNC: 7 MG/L
MICROALBUMIN/CREAT UR: 8.04 MG/G CR (ref 0–17)
NONHDLC SERPL-MCNC: 112 MG/DL
POTASSIUM SERPL-SCNC: 4.1 MMOL/L (ref 3.4–5.3)
PROT SERPL-MCNC: 7.1 G/DL (ref 6.8–8.8)
SODIUM SERPL-SCNC: 134 MMOL/L (ref 133–144)
TRIGL SERPL-MCNC: 290 MG/DL
TSH SERPL DL<=0.005 MIU/L-ACNC: 1.72 MU/L (ref 0.4–4)

## 2017-08-28 PROCEDURE — 82043 UR ALBUMIN QUANTITATIVE: CPT | Performed by: FAMILY MEDICINE

## 2017-08-28 PROCEDURE — 80061 LIPID PANEL: CPT | Performed by: FAMILY MEDICINE

## 2017-08-28 PROCEDURE — 84443 ASSAY THYROID STIM HORMONE: CPT | Performed by: FAMILY MEDICINE

## 2017-08-28 PROCEDURE — 36415 COLL VENOUS BLD VENIPUNCTURE: CPT | Performed by: FAMILY MEDICINE

## 2017-08-28 PROCEDURE — 99207 C FOOT EXAM  NO CHARGE: CPT | Performed by: FAMILY MEDICINE

## 2017-08-28 PROCEDURE — 99214 OFFICE O/P EST MOD 30 MIN: CPT | Performed by: FAMILY MEDICINE

## 2017-08-28 PROCEDURE — 80053 COMPREHEN METABOLIC PANEL: CPT | Performed by: FAMILY MEDICINE

## 2017-08-28 PROCEDURE — 83036 HEMOGLOBIN GLYCOSYLATED A1C: CPT | Performed by: FAMILY MEDICINE

## 2017-08-28 RX ORDER — GLIPIZIDE 5 MG/1
5 TABLET, FILM COATED, EXTENDED RELEASE ORAL DAILY
Qty: 90 TABLET | Refills: 1 | Status: SHIPPED | OUTPATIENT
Start: 2017-08-28 | End: 2018-03-14

## 2017-08-28 RX ORDER — ATORVASTATIN CALCIUM 10 MG/1
10 TABLET, FILM COATED ORAL DAILY
Qty: 90 TABLET | Refills: 1 | Status: SHIPPED | OUTPATIENT
Start: 2017-08-28 | End: 2018-02-03

## 2017-08-28 RX ORDER — LISINOPRIL AND HYDROCHLOROTHIAZIDE 20; 25 MG/1; MG/1
1 TABLET ORAL DAILY
Qty: 90 TABLET | Refills: 1 | Status: SHIPPED | OUTPATIENT
Start: 2017-08-28 | End: 2018-03-14

## 2017-08-28 ASSESSMENT — PAIN SCALES - GENERAL: PAINLEVEL: NO PAIN (0)

## 2017-08-28 NOTE — MR AVS SNAPSHOT
After Visit Summary   8/28/2017    Rick Sandhu    MRN: [id number]           Patient Information     Date Of Birth          1950        Visit Information        Provider Department      8/28/2017 11:00 AM Keila Muñoz MD [facility]        Today's Diagnoses     Type 2 diabetes mellitus without complication, without long-term current use of insulin (H)    -  1    Essential hypertension with goal blood pressure less than 140/90        Hyperlipidemia LDL goal <100           Follow-ups after your visit        Follow-up notes from your care team     Return in about 6 months (around 2/28/2018).      Who to contact     If you have questions or need follow up information about today's clinic visit or your schedule please contact [facility] directly at [phone].  Normal or non-critical lab and imaging results will be communicated to you by MyChart, letter or phone within 4 business days after the clinic has received the results. If you do not hear from us within 7 days, please contact the clinic through INVOLTAhart or phone. If you have a critical or abnormal lab result, we will notify you by phone as soon as possible.  Submit refill requests through Iceotope or call your pharmacy and they will forward the refill request to us. Please allow 3 business days for your refill to be completed.          Additional Information About Your Visit        MyChart Information     Iceotope gives you secure access to your electronic health record. If you see a primary care provider, you can also send messages to your care team and make appointments. If you have questions, please call your primary care clinic.  If you do not have a primary care provider, please call [phone] and they will assist you.        Care EveryWhere ID     This is your Care EveryWhere ID. This could be used by other organizations to access your [place] medical records  [id number]        Your Vitals  "Were     Pulse Temperature Respirations Height Pulse Oximetry BMI (Body Mass Index)    62 98.4  F (36.9  C) (Oral) 16 1.803 m (5' 11\") 96% 31.24 kg/m2       Blood Pressure from Last 3 Encounters:   08/28/17 126/74   02/22/17 136/76   01/04/17 137/70    Weight from Last 3 Encounters:   08/28/17 101.6 kg (224 lb)   02/22/17 101.4 kg (223 lb 9.6 oz)   01/04/17 104.1 kg (229 lb 8 oz)              We Performed the Following     Albumin Random Urine Quantitative with Creat Ratio     Comprehensive metabolic panel     FOOT EXAM  NO CHARGE [81000.114]     HEMOGLOBIN A1C     Lipid panel reflex to direct LDL     TSH WITH FREE T4 REFLEX          Where to get your medicines      These medications were sent to [facility] [address] [place] [facility] [address] [facility]  [address] [facility] [address]     Phone:  [phone]     atorvastatin 10 MG tablet    glipiZIDE 5 MG 24 hr tablet    lisinopril-hydrochlorothiazide 20-25 MG per tablet    metFORMIN 500 MG tablet          Primary Care Provider Office Phone # Fax #    Keila Donn Muñoz [phone] [address] [facility] [address]        Equal Access to Services     SHANNA CONTRERAS AH: Hadii chilango ku hadasho Soomaali, waaxda luqadaha, qaybta kaalmada adeegyada, waxay idiin haypaolan umberto coles. So [facility] [phone].    ATENCIÓN: Si habla español, tiene a ames disposición servicios gratuitos de asistencia lingüística. Fabio al [phone].    We comply with applicable federal civil rights laws and [place] laws. We do not discriminate on the basis of race, color, national origin, age, disability sex, sexual orientation or gender identity.            Thank you!     Thank you for choosing [facility]  for your care. Our goal is always to provide you with excellent care. Hearing back from our patients is one way we can continue to improve our services. Please take a few minutes to complete the written survey that " you may receive in the mail after your visit with us. Thank you!             Your Updated Medication List - Protect others around you: Learn how to safely use, store and throw away your medicines at www.disposemymeds.org.          This list is accurate as of: 8/28/17 11:49 AM.  Always use your most recent med list.                   Brand Name Dispense Instructions for use Diagnosis    aspirin 81 MG tablet      1 TABLET DAILY (*)    Type II or unspecified type diabetes mellitus without mention of complication, not stated as uncontrolled       atorvastatin 10 MG tablet    LIPITOR    90 tablet    Take 1 tablet (10 mg) by mouth daily    Hyperlipidemia LDL goal <100       FIBER DIET Tabs      Take 2 tablets by mouth daily        glipiZIDE 5 MG 24 hr tablet    GLUCOTROL XL    90 tablet    Take 1 tablet (5 mg) by mouth daily    Type 2 diabetes mellitus without complication, without long-term current use of insulin (H)       lisinopril-hydrochlorothiazide 20-25 MG per tablet    PRINZIDE/ZESTORETIC    90 tablet    Take 1 tablet by mouth daily    Essential hypertension with goal blood pressure less than 140/90       melatonin 3 MG tablet      Take 3 mg by mouth nightly as needed Reported on 2/22/2017        metFORMIN 500 MG tablet    GLUCOPHAGE    180 tablet    Take 1 tablet by mouth twice daily with meals    Type 2 diabetes mellitus without complication, without long-term current use of insulin (H)

## 2017-08-28 NOTE — PROGRESS NOTES
"  SUBJECTIVE:   Rick Sandhu is a 67 year old male who presents to clinic today for the following health issues:      Diabetes Follow-up      Patient is checking blood sugars: not at all    Diabetic concerns: None     Symptoms of hypoglycemia (low blood sugar): none     Paresthesias (numbness or burning in feet) or sores: No     Date of last diabetic eye exam: UTD    Hyperlipidemia Follow-Up      Rate your low fat/cholesterol diet?: fair    Taking statin?  Yes, no muscle aches from statin    Other lipid medications/supplements?:  none    Hypertension Follow-up      Outpatient blood pressures are not being checked.    Low Salt Diet: low salt        Amount of exercise or physical activity: 3 days weekly    Problems taking medications regularly: No    Medication side effects: dry mouth  Diet: regular (no restrictions)    SUBJECTIVE:  Here today in follow-up of diabetes, hypertension, lipids  Doing well.  Reports no interval health concerns.    Patient reports no side effects from medications, and desires no change in therapy.     Review of systems otherwise negative.  Past medical, family, and social history reviewed and updated in chart.    OBJECTIVE:  /74 (BP Location: Right arm, Patient Position: Right side, Cuff Size: Adult Regular)  Pulse 62  Temp 98.4  F (36.9  C) (Oral)  Resp 16  Ht 1.803 m (5' 11\")  Wt 101.6 kg (224 lb)  SpO2 96%  BMI 31.24 kg/m2  Alert, pleasant, upbeat, and in no apparent discomfort.  S1 and S2 normal, no murmurs, clicks, gallops or rubs. Regular rate and rhythm. Chest is clear; no wheezes or rales. No edema or JVD.  Past labs reviewed with the patient.   FEET: both sides normal; no swelling, tenderness or skin or vascular lesions.  Sensation is intact. Peripheral pulses are palpable. Toenails are normal.     ASSESSMENT / PLAN:  (E11.9) Type 2 diabetes mellitus without complication, without long-term current use of insulin (H)  (primary encounter diagnosis)  Comment: A1c is " been well controlled and we are following other risk factors as well  Plan: FOOT EXAM  NO CHARGE [74370.114], HEMOGLOBIN         A1C, Lipid panel reflex to direct LDL, Albumin         Random Urine Quantitative with Creat Ratio, TSH        WITH FREE T4 REFLEX, metFORMIN (GLUCOPHAGE) 500        MG tablet, glipiZIDE (GLUCOTROL XL) 5 MG 24 hr         tablet, Comprehensive metabolic panel            (I10) Essential hypertension with goal blood pressure less than 140/90  Comment: Well-controlled on current dosage. Continue to follow  Plan: lisinopril-hydrochlorothiazide         (PRINZIDE/ZESTORETIC) 20-25 MG per tablet            (E78.5) Hyperlipidemia LDL goal <100  Comment: Recheck and adjust as needed  Plan: atorvastatin (LIPITOR) 10 MG tablet            Follow up 6 months or as needed  MARIELLE Muñoz MD    (Chart documentation completed in part with Dragon voice-recognition software.  Even though reviewed some grammatical, spelling, and word errors may remain.)

## 2017-12-01 ENCOUNTER — TELEPHONE (OUTPATIENT)
Dept: FAMILY MEDICINE | Facility: CLINIC | Age: 67
End: 2017-12-01

## 2017-12-01 NOTE — TELEPHONE ENCOUNTER
[place] dept of health forms placed on Dr. Kye alfaro for completion.    Viviana BAUMANN, Patient Care

## 2018-01-02 ENCOUNTER — OFFICE VISIT (OUTPATIENT)
Dept: URGENT CARE | Facility: URGENT CARE | Age: 68
End: 2018-01-02
Payer: COMMERCIAL

## 2018-01-02 VITALS
WEIGHT: 220 LBS | OXYGEN SATURATION: 94 % | TEMPERATURE: 99.2 F | HEART RATE: 84 BPM | SYSTOLIC BLOOD PRESSURE: 120 MMHG | BODY MASS INDEX: 30.68 KG/M2 | DIASTOLIC BLOOD PRESSURE: 74 MMHG

## 2018-01-02 DIAGNOSIS — J40 BRONCHITIS: ICD-10-CM

## 2018-01-02 DIAGNOSIS — R05.9 COUGH: Primary | ICD-10-CM

## 2018-01-02 DIAGNOSIS — J01.90 ACUTE NON-RECURRENT SINUSITIS, UNSPECIFIED LOCATION: ICD-10-CM

## 2018-01-02 LAB
FLUAV+FLUBV AG SPEC QL: NEGATIVE
FLUAV+FLUBV AG SPEC QL: NEGATIVE
SPECIMEN SOURCE: NORMAL

## 2018-01-02 PROCEDURE — 99213 OFFICE O/P EST LOW 20 MIN: CPT | Performed by: FAMILY MEDICINE

## 2018-01-02 PROCEDURE — 87804 INFLUENZA ASSAY W/OPTIC: CPT | Performed by: FAMILY MEDICINE

## 2018-01-02 RX ORDER — LEVOFLOXACIN 500 MG/1
500 TABLET, FILM COATED ORAL DAILY
Qty: 10 TABLET | Refills: 0 | Status: SHIPPED | OUTPATIENT
Start: 2018-01-02 | End: 2018-03-14

## 2018-01-02 NOTE — PROGRESS NOTES
CHIEF COMPLAINT    Sinus congestion, cough      HISTORY    He has been ill since 12-21. Initially head congestion. Now cough in last week - mostly dry cough. He has sinus pressure.    Patient Active Problem List   Diagnosis     Hyperlipidemia LDL goal <100     Rosacea     Sarcoidosis     Advanced directives, counseling/discussion     Posterior vitreous detachment, bilateral     Essential hypertension with goal blood pressure less than 140/90     Type 2 diabetes mellitus without complication, without long-term current use of insulin (H)     Cataract, mild, ou       REVIEW OF SYSTEMS    No ear pain  No CP  No edema      Past Medical History:   Diagnosis Date     Hyperlipidemia LDL goal <100 10/20/2010     Hypertension goal BP (blood pressure) < 140/90 9/1/2011     Sarcoidosis 2/96     Type 2 diabetes, HbA1C goal < 8% (H) 10/20/2010       EXAM  /74 (BP Location: Left arm, Patient Position: Chair, Cuff Size: Adult Large)  Pulse 84  Temp 99.2  F (37.3  C) (Oral)  Wt 220 lb (99.8 kg)  SpO2 94%  BMI 30.68 kg/m2    TM's: nl  Phar: neg  Neck: neg  Chest: cl    Results for orders placed or performed in visit on 01/02/18   Influenza A/B antigen   Result Value Ref Range    Influenza A/B Agn Specimen Nasal     Influenza A Negative NEG^Negative    Influenza B Negative NEG^Negative       (R05) Cough  (primary encounter diagnosis)  Comment:   Plan: Influenza A/B antigen            (J40) Bronchitis  Comment:   Plan: levofloxacin (LEVAQUIN) 500 MG tablet            (J01.90) Acute non-recurrent sinusitis, unspecified location  Comment:   Plan: levofloxacin (LEVAQUIN) 500 MG tablet

## 2018-01-02 NOTE — NURSING NOTE
"Chief Complaint   Patient presents with     URI     Pt c/o URI with sore throat and cough (some production), some chills. Sx started 12 days ago.        Initial /74 (BP Location: Left arm, Patient Position: Chair, Cuff Size: Adult Large)  Pulse 84  Temp 99.2  F (37.3  C) (Oral)  Wt 220 lb (99.8 kg)  SpO2 94%  BMI 30.68 kg/m2 Estimated body mass index is 30.68 kg/(m^2) as calculated from the following:    Height as of 8/28/17: 5' 11\" (1.803 m).    Weight as of this encounter: 220 lb (99.8 kg).  Medication Reconciliation: complete     Veronica Vega CMA (AAMA)      "

## 2018-01-02 NOTE — MR AVS SNAPSHOT
After Visit Summary   1/2/2018    Rick Sandhu    MRN: [id number]           Patient Information     Date Of Birth          1950        Visit Information        Provider Department      1/2/2018 11:20 AM Yogesh Castro MD [facility]        Today's Diagnoses     Cough    -  1    Bronchitis        Acute non-recurrent sinusitis, unspecified location          Care Instructions      Take prescribed medication as directed.    Generic Claritin or Allegra may help congestion.          Follow-ups after your visit        Who to contact     If you have questions or need follow up information about today's clinic visit or your schedule please contact [facility] directly at [phone].  Normal or non-critical lab and imaging results will be communicated to you by MyChart, letter or phone within 4 business days after the clinic has received the results. If you do not hear from us within 7 days, please contact the clinic through [employer] or phone. If you have a critical or abnormal lab result, we will notify you by phone as soon as possible.  Submit refill requests through [employer] or call your pharmacy and they will forward the refill request to us. Please allow 3 business days for your refill to be completed.          Additional Information About Your Visit        MyChart Information     [employer] gives you secure access to your electronic health record. If you see a primary care provider, you can also send messages to your care team and make appointments. If you have questions, please call your primary care clinic.  If you do not have a primary care provider, please call [phone] and they will assist you.        Care EveryWhere ID     This is your Care EveryWhere ID. This could be used by other organizations to access your [place] medical records  [id number]        Your Vitals Were     Pulse Temperature Pulse Oximetry BMI (Body Mass Index)          84 99.2  F  (37.3  C) (Oral) 94% 30.68 kg/m2         Blood Pressure from Last 3 Encounters:   01/02/18 120/74   08/28/17 126/74   02/22/17 136/76    Weight from Last 3 Encounters:   01/02/18 220 lb (99.8 kg)   08/28/17 224 lb (101.6 kg)   02/22/17 223 lb 9.6 oz (101.4 kg)              We Performed the Following     Influenza A/B antigen          Today's Medication Changes          These changes are accurate as of: 1/2/18 12:16 PM.  If you have any questions, ask your nurse or doctor.               Start taking these medicines.        Dose/Directions    levofloxacin 500 MG tablet   Commonly known as:  LEVAQUIN   Used for:  Bronchitis, Acute non-recurrent sinusitis, unspecified location   Started by:  Yogesh Castro MD        Dose:  500 mg   Take 1 tablet (500 mg) by mouth daily   Quantity:  10 tablet   Refills:  0            Where to get your medicines      These medications were sent to [place] [address] [place] [facility] [address] [facility]  [address] [place] [address]     Phone:  [phone]     levofloxacin 500 MG tablet                Primary Care Provider Office Phone # Fax #    Keila Donn Muñoz [phone] [address] [facility] [address]        Equal Access to Services     [facility] AH: Hadii aad ku hadasho Soomaali, waaxda luqadaha, qaybta kaalmada adeegyada, waxay samson haylance almendarez . So [facility] [phone].    ATENCIÓN: Si habla español, tiene a ames disposición servicios gratuitos de asistencia lingüística. Llame al [phone].    We comply with applicable federal civil rights laws and [place] laws. We do not discriminate on the basis of race, color, national origin, age, disability, sex, sexual orientation, or gender identity.            Thank you!     Thank you for choosing [facility]  for your care. Our goal is always to provide you with excellent care. Hearing back from our patients is one way we can continue  to improve our services. Please take a few minutes to complete the written survey that you may receive in the mail after your visit with us. Thank you!             Your Updated Medication List - Protect others around you: Learn how to safely use, store and throw away your medicines at www.disposemymeds.org.          This list is accurate as of: 1/2/18 12:16 PM.  Always use your most recent med list.                   Brand Name Dispense Instructions for use Diagnosis    aspirin 81 MG tablet      1 TABLET DAILY (*)    Type II or unspecified type diabetes mellitus without mention of complication, not stated as uncontrolled       atorvastatin 10 MG tablet    LIPITOR    90 tablet    Take 1 tablet (10 mg) by mouth daily    Hyperlipidemia LDL goal <100       FIBER DIET Tabs      Take 2 tablets by mouth daily        glipiZIDE 5 MG 24 hr tablet    GLUCOTROL XL    90 tablet    Take 1 tablet (5 mg) by mouth daily    Type 2 diabetes mellitus without complication, without long-term current use of insulin (H)       levofloxacin 500 MG tablet    LEVAQUIN    10 tablet    Take 1 tablet (500 mg) by mouth daily    Bronchitis, Acute non-recurrent sinusitis, unspecified location       lisinopril-hydrochlorothiazide 20-25 MG per tablet    PRINZIDE/ZESTORETIC    90 tablet    Take 1 tablet by mouth daily    Essential hypertension with goal blood pressure less than 140/90       melatonin 3 MG tablet      Take 3 mg by mouth nightly as needed Reported on 2/22/2017        metFORMIN 500 MG tablet    GLUCOPHAGE    180 tablet    Take 1 tablet by mouth twice daily with meals    Type 2 diabetes mellitus without complication, without long-term current use of insulin (H)

## 2018-02-03 DIAGNOSIS — E78.5 HYPERLIPIDEMIA LDL GOAL <100: ICD-10-CM

## 2018-02-05 NOTE — TELEPHONE ENCOUNTER
"Requested Prescriptions   Pending Prescriptions Disp Refills     atorvastatin (LIPITOR) 10 MG tablet [Pharmacy Med Name: ATORVASTATIN 10 MG TABLET] 90 tablet 1     Sig: TAKE 1 TABLET (10 MG) BY MOUTH DAILY    Statins Protocol Passed    2/3/2018  9:49 AM       Passed - LDL on file in past 12 months    Recent Labs   Lab Test  08/28/17   1154   LDL  54            Passed - No abnormal creatine kinase in past 12 months    No lab results found.         Passed - Recent or future visit with authorizing provider    Patient had office visit in the last year or has a visit in the next 30 days with authorizing provider.  See \"Patient Info\" tab in inbasket, or \"Choose Columns\" in Meds & Orders section of the refill encounter.            Passed - Patient is age 18 or older        atorvastatin (LIPITOR) 10 MG tablet  Last Written Prescription Date:  8/28/17  Last Fill Quantity: 90,  # refills: 1   Last Office Visit with [facility] provider:  8/28/17   Future Office Visit:       "

## 2018-02-07 RX ORDER — ATORVASTATIN CALCIUM 10 MG/1
TABLET, FILM COATED ORAL
Qty: 90 TABLET | Refills: 1 | Status: SHIPPED | OUTPATIENT
Start: 2018-02-07 | End: 2018-03-14

## 2018-02-07 NOTE — TELEPHONE ENCOUNTER
Prescription approved per [facility] Refill Protocol.  Nereida Gonzalez RN

## 2018-03-13 DIAGNOSIS — E11.9 TYPE 2 DIABETES MELLITUS WITHOUT COMPLICATION, WITHOUT LONG-TERM CURRENT USE OF INSULIN (H): ICD-10-CM

## 2018-03-13 DIAGNOSIS — I10 ESSENTIAL HYPERTENSION WITH GOAL BLOOD PRESSURE LESS THAN 140/90: ICD-10-CM

## 2018-03-13 RX ORDER — GLIPIZIDE 5 MG/1
TABLET, FILM COATED, EXTENDED RELEASE ORAL
Qty: 90 TABLET | Refills: 1 | Status: CANCELLED | OUTPATIENT
Start: 2018-03-13

## 2018-03-13 RX ORDER — LISINOPRIL AND HYDROCHLOROTHIAZIDE 20; 25 MG/1; MG/1
TABLET ORAL
Qty: 90 TABLET | Refills: 1 | Status: CANCELLED | OUTPATIENT
Start: 2018-03-13

## 2018-03-13 NOTE — TELEPHONE ENCOUNTER
"Requested Prescriptions   Pending Prescriptions Disp Refills     metFORMIN (GLUCOPHAGE) 500 MG tablet [Pharmacy Med Name: METFORMIN  MG TABLET]  Last Written Prescription Date:  8/28/17  Last Fill Quantity: 180 tablet,  # refills: 1   Last office visit: 8/28/2017 with prescribing provider:  Dr. Muñoz   Future Office Visit:   Next 5 appointments (look out 90 days)     Mar 14, 2018  9:00 AM CDT   [facility]    [address] [place] [id number] [phone]               180 tablet 1     Sig: TAKE 1 TABLET BY MOUTH TWICE DAILY WITH MEALS    Biguanide Agents Failed    3/13/2018  1:47 AM       Failed - Patient has documented A1c within the specified period of time.    Recent Labs   Lab Test  08/28/17   1154   A1C  7.2*            Passed - Blood pressure less than 140/90 in past 6 months    BP Readings from Last 3 Encounters:   01/02/18 120/74   08/28/17 126/74   02/22/17 136/76                Passed - Patient has documented LDL within the past 12 mos.    Recent Labs   Lab Test  08/28/17   1154   LDL  54            Passed - Patient has had a Microalbumin in the past 12 mos.    Recent Labs   Lab Test  08/28/17   1156   MICROL  7   UMALCR  8.04            Passed - Patient is age 10 or older       Passed - Patient's CR is NOT>1.4 OR Patient's EGFR is NOT<45 within past 12 mos.    Recent Labs   Lab Test  08/28/17   1154   GFRESTIMATED  >90   GFRESTBLACK  >90       Recent Labs   Lab Test  08/28/17   1154   CR  0.84            Passed - Patient does NOT have a diagnosis of CHF.       Passed - Recent (6 mo) or future (30 days) visit within the authorizing provider's specialty    Patient had office visit in the last 6 months or has a visit in the next 30 days with authorizing provider or within the authorizing provider's specialty.  See \"Patient Info\" tab in inbasket, or \"Choose Columns\" in Meds & Orders section " "of the refill encounter.                  lisinopril-hydrochlorothiazide (PRINZIDE/ZESTORETIC) 20-25 MG per tablet [Pharmacy Med Name: LISINOPRIL-HCTZ 20-25 MG TAB]  Last Written Prescription Date:  8/28/17  Last Fill Quantity: 90 tablet,  # refills: 1   Last office visit: 8/28/2017 with prescribing provider:  Dr. Muñoz   Future Office Visit:   Next 5 appointments (look out 90 days)     Mar 14, 2018  9:00 AM CDT   [facility] Visit with [facility]ila [facility]   [facility]    [address] [place] [id number] [phone]                90 tablet 1     Sig: TAKE 1 TABLET BY MOUTH DAILY    Diuretics (Including Combos) Protocol Passed    3/13/2018  1:47 AM       Passed - Blood pressure under 140/90 in past 12 months    BP Readings from Last 3 Encounters:   01/02/18 120/74   08/28/17 126/74   02/22/17 136/76                Passed - Recent (12 mo) or future (30 days) visit within the authorizing provider's specialty    Patient had office visit in the last 12 months or has a visit in the next 30 days with authorizing provider or within the authorizing provider's specialty.  See \"Patient Info\" tab in inbasket, or \"Choose Columns\" in Meds & Orders section of the refill encounter.           Passed - Patient is age 18 or older       Passed - Normal serum creatinine on file in past 12 months    Recent Labs   Lab Test  08/28/17   1154   CR  0.84             Passed - Normal serum potassium on file in past 12 months    Recent Labs   Lab Test  08/28/17   1154   POTASSIUM  4.1                   Passed - Normal serum sodium on file in past 12 months    Recent Labs   Lab Test  08/28/17   1154   NA  134                      glipiZIDE (GLUCOTROL XL) 5 MG 24 hr tablet [Pharmacy Med Name: GLIPIZIDE ER 5 MG TABLET]  Last Written Prescription Date:  8/28/17  Last Fill Quantity: 90 tablet,  # refills: 1   Last office visit: 8/28/2017 with prescribing provider:  Dr. Muñoz " "  Future Office Visit:   Next 5 appointments (look out 90 days)     Mar 14, 2018  9:00 AM CDT   [facility]    [address] [facility] [address] [phone]               90 tablet 1     Sig: TAKE 1 TABLET (5 MG) BY MOUTH DAILY    Sulfonylurea Agents Failed    3/13/2018  1:47 AM       Failed - Patient has documented A1c within the specified period of time.    Recent Labs   Lab Test  08/28/17   1154   A1C  7.2*            Passed - Blood pressure less than 140/90 in past 6 months    BP Readings from Last 3 Encounters:   01/02/18 120/74   08/28/17 126/74   02/22/17 136/76                Passed - Patient has documented LDL within the past 12 mos.    Recent Labs   Lab Test  08/28/17   1154   LDL  54            Passed - Patient has had a Microalbumin in the past 12 mos.    Recent Labs   Lab Test  08/28/17   1156   MICROL  7   UMALCR  8.04            Passed - Patient is age 18 or older       Passed - Patient has a recent creatinine (normal) within the past 12 mos.    Recent Labs   Lab Test  08/28/17   1154   CR  0.84            Passed - Recent (6 mo) or future (30 days) visit within the authorizing provider's specialty    Patient had office visit in the last 6 months or has a visit in the next 30 days with authorizing provider or within the authorizing provider's specialty.  See \"Patient Info\" tab in inbasket, or \"Choose Columns\" in Meds & Orders section of the refill encounter.              "

## 2018-03-14 ENCOUNTER — OFFICE VISIT (OUTPATIENT)
Dept: FAMILY MEDICINE | Facility: CLINIC | Age: 68
End: 2018-03-14
Payer: COMMERCIAL

## 2018-03-14 VITALS
WEIGHT: 227 LBS | HEIGHT: 71 IN | BODY MASS INDEX: 31.78 KG/M2 | OXYGEN SATURATION: 97 % | TEMPERATURE: 98.8 F | HEART RATE: 71 BPM | SYSTOLIC BLOOD PRESSURE: 104 MMHG | DIASTOLIC BLOOD PRESSURE: 60 MMHG | RESPIRATION RATE: 20 BRPM

## 2018-03-14 DIAGNOSIS — E11.9 TYPE 2 DIABETES MELLITUS WITHOUT COMPLICATION, WITHOUT LONG-TERM CURRENT USE OF INSULIN (H): Primary | ICD-10-CM

## 2018-03-14 DIAGNOSIS — I10 ESSENTIAL HYPERTENSION WITH GOAL BLOOD PRESSURE LESS THAN 140/90: ICD-10-CM

## 2018-03-14 DIAGNOSIS — E78.5 HYPERLIPIDEMIA LDL GOAL <100: ICD-10-CM

## 2018-03-14 LAB
ANION GAP SERPL CALCULATED.3IONS-SCNC: 8 MMOL/L (ref 3–14)
BUN SERPL-MCNC: 16 MG/DL (ref 7–30)
CALCIUM SERPL-MCNC: 9.1 MG/DL (ref 8.5–10.1)
CHLORIDE SERPL-SCNC: 103 MMOL/L (ref 94–109)
CO2 SERPL-SCNC: 26 MMOL/L (ref 20–32)
CREAT SERPL-MCNC: 0.78 MG/DL (ref 0.66–1.25)
GFR SERPL CREATININE-BSD FRML MDRD: >90 ML/MIN/1.7M2
GLUCOSE SERPL-MCNC: 171 MG/DL (ref 70–99)
HBA1C MFR BLD: 7.7 % (ref 4.3–6)
POTASSIUM SERPL-SCNC: 4.2 MMOL/L (ref 3.4–5.3)
SODIUM SERPL-SCNC: 137 MMOL/L (ref 133–144)

## 2018-03-14 PROCEDURE — 36415 COLL VENOUS BLD VENIPUNCTURE: CPT | Performed by: FAMILY MEDICINE

## 2018-03-14 PROCEDURE — 83036 HEMOGLOBIN GLYCOSYLATED A1C: CPT | Performed by: FAMILY MEDICINE

## 2018-03-14 PROCEDURE — 99214 OFFICE O/P EST MOD 30 MIN: CPT | Performed by: FAMILY MEDICINE

## 2018-03-14 PROCEDURE — 80048 BASIC METABOLIC PNL TOTAL CA: CPT | Performed by: FAMILY MEDICINE

## 2018-03-14 RX ORDER — LISINOPRIL AND HYDROCHLOROTHIAZIDE 20; 25 MG/1; MG/1
1 TABLET ORAL DAILY
Qty: 90 TABLET | Refills: 1 | Status: SHIPPED | OUTPATIENT
Start: 2018-03-14 | End: 2018-08-27

## 2018-03-14 RX ORDER — ATORVASTATIN CALCIUM 10 MG/1
TABLET, FILM COATED ORAL
Qty: 90 TABLET | Refills: 1 | Status: SHIPPED | OUTPATIENT
Start: 2018-03-14 | End: 2018-08-27

## 2018-03-14 RX ORDER — GLIPIZIDE 5 MG/1
5 TABLET, FILM COATED, EXTENDED RELEASE ORAL DAILY
Qty: 90 TABLET | Refills: 1 | Status: SHIPPED | OUTPATIENT
Start: 2018-03-14 | End: 2018-08-27

## 2018-03-14 NOTE — MR AVS SNAPSHOT
After Visit Summary   3/14/2018    Rick Sandhu    MRN: [id number]           Patient Information     Date Of Birth          1950        Visit Information        Provider Department      3/14/2018 9:00 AM Keila Muñoz MD [facility]        Today's Diagnoses     Type 2 diabetes mellitus without complication, without long-term current use of insulin (H)    -  1    Essential hypertension with goal blood pressure less than 140/90        Hyperlipidemia LDL goal <100           Follow-ups after your visit        Follow-up notes from your care team     Return in about 6 months (around 9/14/2018).      Your next 10 appointments already scheduled     Mar 20, 2018  3:00 PM CDT   [facility] Bed[facility]    [address] [facility] [address] [phone]              Who to contact     If you have questions or need follow up information about today's clinic visit or your schedule please contact [facility] directly at [phone].  Normal or non-critical lab and imaging results will be communicated to you by MyChart, letter or phone within 4 business days after the clinic has received the results. If you do not hear from us within 7 days, please contact the clinic through MyChart or phone. If you have a critical or abnormal lab result, we will notify you by phone as soon as possible.  Submit refill requests through [employer] or call your pharmacy and they will forward the refill request to us. Please allow 3 business days for your refill to be completed.          Additional Information About Your Visit        MyChart Information     [employer] gives you secure access to your electronic health record. If you see a primary care provider, you can also send messages to your care team and make appointments. If you have questions, please call your primary care clinic.  If you do not have a primary care  "provider, please call [phone] and they will assist you.        Care EveryWhere ID     This is your Care EveryWhere ID. This could be used by other organizations to access your [place] medical records  [id number]        Your Vitals Were     Pulse Temperature Respirations Height Pulse Oximetry BMI (Body Mass Index)    71 98.8  F (37.1  C) (Oral) 20 1.803 m (5' 11\") 97% 31.66 kg/m2       Blood Pressure from Last 3 Encounters:   03/14/18 104/60   01/02/18 120/74   08/28/17 126/74    Weight from Last 3 Encounters:   03/14/18 103 kg (227 lb)   01/02/18 99.8 kg (220 lb)   08/28/17 101.6 kg (224 lb)              We Performed the Following     Basic metabolic panel     HEMOGLOBIN A1C          Today's Medication Changes          These changes are accurate as of 3/14/18  9:31 AM.  If you have any questions, ask your nurse or doctor.               These medicines have changed or have updated prescriptions.        Dose/Directions    atorvastatin 10 MG tablet   Commonly known as:  LIPITOR   This may have changed:  See the new instructions.   Used for:  Hyperlipidemia LDL goal <100   Changed by:  Keila Muñoz MD        TAKE 1 TABLET (10 MG) BY MOUTH DAILY   Quantity:  90 tablet   Refills:  1         Stop taking these medicines if you haven't already. Please contact your care team if you have questions.     levofloxacin 500 MG tablet   Commonly known as:  LEVAQUIN   Stopped by:  Keila Muñoz MD                Where to get your medicines      These medications were sent to [facility] [address] [place] [facility] [address] [facility]  [address] [facility] [address]     Phone:  [phone]     atorvastatin 10 MG tablet    glipiZIDE 5 MG 24 hr tablet    lisinopril-hydrochlorothiazide 20-25 MG per tablet    metFORMIN 500 MG tablet                Primary Care Provider Office Phone # Fax #    Keila Muñoz [phone] [address] [facility] [address]   "      Equal Access to Services     [facility] : Hadii aad ku hadivanflorinda Worrell, walucasda luqadaha, qaybta kakathywinston santo. So [place] [phone].    ATENCIÓN: Si habla español, tiene a ames disposición servicios gratuitos de asistencia lingüística. Kasieame al [phone].    We comply with applicable federal civil rights laws and [place] laws. We do not discriminate on the basis of race, color, national origin, age, disability, sex, sexual orientation, or gender identity.            Thank you!     Thank you for choosing [facility]  for your care. Our goal is always to provide you with excellent care. Hearing back from our patients is one way we can continue to improve our services. Please take a few minutes to complete the written survey that you may receive in the mail after your visit with us. Thank you!             Your Updated Medication List - Protect others around you: Learn how to safely use, store and throw away your medicines at www.disposemymeds.org.          This list is accurate as of 3/14/18  9:31 AM.  Always use your most recent med list.                   Brand Name Dispense Instructions for use Diagnosis    aspirin 81 MG tablet      1 TABLET DAILY (*)    Type II or unspecified type diabetes mellitus without mention of complication, not stated as uncontrolled       atorvastatin 10 MG tablet    LIPITOR    90 tablet    TAKE 1 TABLET (10 MG) BY MOUTH DAILY    Hyperlipidemia LDL goal <100       FIBER DIET Tabs      Take 2 tablets by mouth daily        glipiZIDE 5 MG 24 hr tablet    GLUCOTROL XL    90 tablet    Take 1 tablet (5 mg) by mouth daily    Type 2 diabetes mellitus without complication, without long-term current use of insulin (H)       lisinopril-hydrochlorothiazide 20-25 MG per tablet    PRINZIDE/ZESTORETIC    90 tablet    Take 1 tablet by mouth daily    Essential hypertension with goal blood pressure less than 140/90       melatonin 3 MG  tablet      Take 3 mg by mouth nightly as needed Reported on 2/22/2017        metFORMIN 500 MG tablet    GLUCOPHAGE    180 tablet    Take 1 tablet by mouth twice daily with meals    Type 2 diabetes mellitus without complication, without long-term current use of insulin (H)

## 2018-03-14 NOTE — PROGRESS NOTES
"  SUBJECTIVE:   Rick Sandhu is a 68 year old male who presents to clinic today for the following health issues:      Diabetes Follow-up      Patient is checking blood sugars: not at all    Diabetic concerns: None     Symptoms of hypoglycemia (low blood sugar): none     Paresthesias (numbness or burning in feet) or sores: No     Date of last diabetic eye exam: a year ago    BP Readings from Last 2 Encounters:   01/02/18 120/74   08/28/17 126/74     Hemoglobin A1C (%)   Date Value   08/28/2017 7.2 (H)   02/22/2017 7.4 (H)     LDL Cholesterol Calculated (mg/dL)   Date Value   08/28/2017 54   08/19/2016 56       Amount of exercise or physical activity: 2-3 days/week for an average of 30-45 minutes    Problems taking medications regularly: No    Medication side effects: none    Diet: regular (no restrictions)    SUBJECTIVE:  Here today in follow-up of diabetes, hypertension, lipids  Doing well.  Reports no interval health concerns.   Patient reports no side effects from medications, and desires no change in therapy.     Review of systems otherwise negative.  Past medical, family, and social history reviewed and updated in chart.    OBJECTIVE:  /60 (BP Location: Right arm, Patient Position: Sitting, Cuff Size: Adult Regular)  Pulse 71  Temp 98.8  F (37.1  C) (Oral)  Resp 20  Ht 1.803 m (5' 11\")  Wt 103 kg (227 lb)  SpO2 97%  BMI 31.66 kg/m2  Alert, pleasant, upbeat, and in no apparent discomfort.  S1 and S2 normal, no murmurs, clicks, gallops or rubs. Regular rate and rhythm. Chest is clear; no wheezes or rales. No edema or JVD.  Past labs reviewed with the patient.     ASSESSMENT / PLAN:  (E11.9) Type 2 diabetes mellitus without complication, without long-term current use of insulin (H)  (primary encounter diagnosis)  Comment: But still like to see A1c a little bit below 7 but currently acceptable.  Blood pressure well controlled.  Lipids and kidney function well-controlled  Plan: HEMOGLOBIN A1C, " metFORMIN (GLUCOPHAGE) 500 MG         tablet, glipiZIDE (GLUCOTROL XL) 5 MG 24 hr         tablet, Basic metabolic panel        Recheck and adjust as needed    (I10) Essential hypertension with goal blood pressure less than 140/90  Comment: Well-controlled on current regimen.  Continue  Plan: lisinopril-hydrochlorothiazide         (PRINZIDE/ZESTORETIC) 20-25 MG per tablet            (E78.5) Hyperlipidemia LDL goal <100  Comment: Well-controlled.  Continue  Plan: atorvastatin (LIPITOR) 10 MG tablet            Follow up 6 months or based upon lab results  MARIELLE Muñoz MD    (Chart documentation completed in part with Dragon voice-recognition software.  Even though reviewed some grammatical, spelling, and word errors may remain.)

## 2018-03-14 NOTE — NURSING NOTE
"Chief Complaint   Patient presents with     Diabetes       Initial /60 (BP Location: Right arm, Patient Position: Sitting, Cuff Size: Adult Regular)  Pulse 71  Temp 98.8  F (37.1  C) (Oral)  Resp 20  Ht 1.803 m (5' 11\")  Wt 103 kg (227 lb)  SpO2 97%  BMI 31.66 kg/m2 Estimated body mass index is 31.66 kg/(m^2) as calculated from the following:    Height as of this encounter: 1.803 m (5' 11\").    Weight as of this encounter: 103 kg (227 lb).  Medication Reconciliation: kay Martin        "

## 2018-03-20 ENCOUNTER — OFFICE VISIT (OUTPATIENT)
Dept: OPHTHALMOLOGY | Facility: CLINIC | Age: 68
End: 2018-03-20
Payer: COMMERCIAL

## 2018-03-20 DIAGNOSIS — H25.813 COMBINED FORMS OF AGE-RELATED CATARACT OF BOTH EYES: ICD-10-CM

## 2018-03-20 DIAGNOSIS — H52.4 PRESBYOPIA: ICD-10-CM

## 2018-03-20 DIAGNOSIS — L71.9 ROSACEA: ICD-10-CM

## 2018-03-20 DIAGNOSIS — H43.813 POSTERIOR VITREOUS DETACHMENT, BILATERAL: ICD-10-CM

## 2018-03-20 DIAGNOSIS — E11.9 TYPE 2 DIABETES MELLITUS WITHOUT COMPLICATION, WITHOUT LONG-TERM CURRENT USE OF INSULIN (H): Primary | ICD-10-CM

## 2018-03-20 PROCEDURE — 92015 DETERMINE REFRACTIVE STATE: CPT | Performed by: OPHTHALMOLOGY

## 2018-03-20 PROCEDURE — 92014 COMPRE OPH EXAM EST PT 1/>: CPT | Performed by: OPHTHALMOLOGY

## 2018-03-20 ASSESSMENT — REFRACTION_WEARINGRX
SPECS_TYPE: PAL
OD_CYLINDER: +2.00
OD_AXIS: 075
OS_AXIS: 097
OS_SPHERE: -5.75
OD_ADD: +2.50
OS_CYLINDER: +2.25
OS_ADD: +2.50
OD_SPHERE: -5.75

## 2018-03-20 ASSESSMENT — CONF VISUAL FIELD
OS_NORMAL: 1
OD_NORMAL: 1

## 2018-03-20 ASSESSMENT — REFRACTION_MANIFEST
OS_AXIS: 098
OD_CYLINDER: +2.00
OD_ADD: +2.50
OS_SPHERE: -5.00
OS_CYLINDER: +1.50
OD_AXIS: 075
OD_SPHERE: -5.75
OS_ADD: +2.50

## 2018-03-20 ASSESSMENT — EXTERNAL EXAM - LEFT EYE: OS_EXAM: 2+ BROW PTOSIS, ROSACEA

## 2018-03-20 ASSESSMENT — VISUAL ACUITY
CORRECTION_TYPE: GLASSES
OS_CC: 2
OD_CC: 20/30
METHOD: SNELLEN - LINEAR
OD_CC: 2
OS_CC: 20/30
OD_CC+: -1

## 2018-03-20 ASSESSMENT — TONOMETRY
OD_IOP_MMHG: 15
OS_IOP_MMHG: 15
IOP_METHOD: APPLANATION

## 2018-03-20 ASSESSMENT — CUP TO DISC RATIO
OD_RATIO: 0.3
OS_RATIO: 0.3

## 2018-03-20 ASSESSMENT — EXTERNAL EXAM - RIGHT EYE: OD_EXAM: 2+ BROW PTOSIS, ROSACEA

## 2018-03-20 NOTE — PROGRESS NOTES
Current Eye Medications:  None.       Subjective:  Comprehensive Eye Exam.  Patient is here for a Diabetic Eye Exam.  No vision changes or concerns, but his glasses need to replaced secondary to scratches on the lenses.      Lab Results   Component Value Date    A1C 7.7 03/14/2018    A1C 7.2 08/28/2017    A1C 7.4 02/22/2017    A1C 7.3 08/19/2016    A1C 7.2 02/17/2016      Objective:  See Ophthalmology Exam.       Assessment:  Mild worsening of cataracts both eyes.  No diabetic retinopathy.      ICD-10-CM    1. Type 2 diabetes mellitus without complication, without long-term current use of insulin (H) E11.9 EYE EXAM (SIMPLE-NONBILLABLE)   2. Combined forms of age-related cataract, mild-mod, of both eyes H25.813    3. Rosacea L71.9    4. Posterior vitreous detachment, bilateral H43.813    5. Presbyopia H52.4 REFRACTIVE STATUS        Plan:  Glasses Rx given - optional  Call in November 2018 for an appointment in March 2019 for Complete Exam    Dr. Bedolla [phone]

## 2018-03-20 NOTE — PATIENT INSTRUCTIONS
Glasses Rx given - optional  Call in November 2018 for an appointment in March 2019 for Complete Exam    Dr. Bedolla [phone]    Diabetes weakens the blood vessels all over the body, including the eyes. Damage to the blood vessels in the eyes can cause swelling or bleeding into part of the eye (called the retina). This is called diabetic retinopathy (MEGAN-tin-AH-puh-thee). If not treated, this disease can cause vision loss or blindness.   Symptoms may include blurred or distorted vision, but many people have no symptoms. It's important to see your eye doctor regularly to check for problems.   Early treatment and good control can help protect your vision. Here are the things you can do to help prevent vision loss:      1. Keep your blood sugar levels under tight control.      2. Bring high blood pressure under control.      3. No smoking.      4. Have yearly dilated eye exams.

## 2018-03-20 NOTE — MR AVS SNAPSHOT
After Visit Summary   3/20/2018    Rick Sandhu    MRN: [id number]           Patient Information     Date Of Birth          1950        Visit Information        Provider Department      3/20/2018 3:00 PM Man Bedolla MD [facility]        Today's Diagnoses     Presbyopia    -  1      Care Instructions    Glasses Rx given - optional  Call in November 2018 for an appointment in March 2019 for Complete Exam    Dr. Bedolla [phone]    Diabetes weakens the blood vessels all over the body, including the eyes. Damage to the blood vessels in the eyes can cause swelling or bleeding into part of the eye (called the retina). This is called diabetic retinopathy (MEGAN-tin-AH-puh-thee). If not treated, this disease can cause vision loss or blindness.   Symptoms may include blurred or distorted vision, but many people have no symptoms. It's important to see your eye doctor regularly to check for problems.   Early treatment and good control can help protect your vision. Here are the things you can do to help prevent vision loss:      1. Keep your blood sugar levels under tight control.      2. Bring high blood pressure under control.      3. No smoking.      4. Have yearly dilated eye exams.            Follow-ups after your visit        Who to contact     If you have questions or need follow up information about today's clinic visit or your schedule please contact [facility] directly at [phone].  Normal or non-critical lab and imaging results will be communicated to you by MyChart, letter or phone within 4 business days after the clinic has received the results. If you do not hear from us within 7 days, please contact the clinic through MyChart or phone. If you have a critical or abnormal lab result, we will notify you by phone as soon as possible.  Submit refill requests through [employer] or call your pharmacy and they will forward the refill request to us. Please allow  3 business days for your refill to be completed.          Additional Information About Your Visit        MyChart Information     [employer] gives you secure access to your electronic health record. If you see a primary care provider, you can also send messages to your care team and make appointments. If you have questions, please call your primary care clinic.  If you do not have a primary care provider, please call [phone] and they will assist you.        Care EveryWhere ID     This is your Care EveryWhere ID. This could be used by other organizations to access your [place] medical records  [id number]         Blood Pressure from Last 3 Encounters:   03/14/18 104/60   01/02/18 120/74   08/28/17 126/74    Weight from Last 3 Encounters:   03/14/18 103 kg (227 lb)   01/02/18 99.8 kg (220 lb)   08/28/17 101.6 kg (224 lb)              Today, you had the following     No orders found for display       Primary Care Provider Office Phone # Fax #    Keila Donn Muñoz [phone] [address] [facility] [address]        Equal Access to Services     [facility]: Hadii aad ku hadasho Soomaali, waaxda luqadaha, qaybta kaalmada adeliamyasunitha, winston almendarez . So [facility] [phone].    ATENCIÓN: Si habla español, tiene a ames disposición servicios gratuitos de asistencia lingüística. [facility] [phone].    We comply with applicable federal civil rights laws and [place] laws. We do not discriminate on the basis of race, color, national origin, age, disability, sex, sexual orientation, or gender identity.            Thank you!     Thank you for choosing [facility]  for your care. Our goal is always to provide you with excellent care. Hearing back from our patients is one way we can continue to improve our services. Please take a few minutes to complete the written survey that you may receive in the mail after your visit with us. Thank you!              Your Updated Medication List - Protect others around you: Learn how to safely use, store and throw away your medicines at www.disposemymeds.org.          This list is accurate as of 3/20/18  4:19 PM.  Always use your most recent med list.                   Brand Name Dispense Instructions for use Diagnosis    aspirin 81 MG tablet      1 TABLET DAILY (*)    Type II or unspecified type diabetes mellitus without mention of complication, not stated as uncontrolled       atorvastatin 10 MG tablet    LIPITOR    90 tablet    TAKE 1 TABLET (10 MG) BY MOUTH DAILY    Hyperlipidemia LDL goal <100       FIBER DIET Tabs      Take 2 tablets by mouth daily        glipiZIDE 5 MG 24 hr tablet    GLUCOTROL XL    90 tablet    Take 1 tablet (5 mg) by mouth daily    Type 2 diabetes mellitus without complication, without long-term current use of insulin (H)       lisinopril-hydrochlorothiazide 20-25 MG per tablet    PRINZIDE/ZESTORETIC    90 tablet    Take 1 tablet by mouth daily    Essential hypertension with goal blood pressure less than 140/90       melatonin 3 MG tablet      Take 3 mg by mouth nightly as needed Reported on 2/22/2017        metFORMIN 500 MG tablet    GLUCOPHAGE    180 tablet    Take 1 tablet by mouth twice daily with meals    Type 2 diabetes mellitus without complication, without long-term current use of insulin (H)

## 2018-03-21 PROBLEM — H25.813 COMBINED FORMS OF AGE-RELATED CATARACT OF BOTH EYES: Status: ACTIVE | Noted: 2018-03-21

## 2018-03-21 PROBLEM — H26.9 CATARACT: Status: RESOLVED | Noted: 2017-03-19 | Resolved: 2018-03-21

## 2018-08-26 ENCOUNTER — TELEPHONE (OUTPATIENT)
Dept: FAMILY MEDICINE | Facility: CLINIC | Age: 68
End: 2018-08-26

## 2018-08-26 DIAGNOSIS — I10 ESSENTIAL HYPERTENSION WITH GOAL BLOOD PRESSURE LESS THAN 140/90: ICD-10-CM

## 2018-08-26 DIAGNOSIS — E78.5 HYPERLIPIDEMIA LDL GOAL <100: ICD-10-CM

## 2018-08-26 DIAGNOSIS — E11.9 TYPE 2 DIABETES MELLITUS WITHOUT COMPLICATION, WITHOUT LONG-TERM CURRENT USE OF INSULIN (H): ICD-10-CM

## 2018-08-26 NOTE — TELEPHONE ENCOUNTER
Reason for Call:  Please call patient    Detailed comments: Through [employer], Dr. Muñoz does not have any openings until October, He is scheduled for October 1st as there are no openings as he is suppose to be seen on September 15 for medication refills. He would like to know if DR. Muñoz can give him refills to last until he is seen on the 1st.     Phone Number Patient can be reached at: Cell number on file:    Telephone Information:   Mobile [phone]       Best Time: anytime    Can we leave a detailed message on this number? YES    Call taken on 8/26/2018 at 3:18 PM by Meet Webb

## 2018-08-27 RX ORDER — GLIPIZIDE 5 MG/1
5 TABLET, FILM COATED, EXTENDED RELEASE ORAL DAILY
Qty: 90 TABLET | Refills: 1 | Status: SHIPPED | OUTPATIENT
Start: 2018-08-27 | End: 2019-03-11

## 2018-08-27 RX ORDER — LISINOPRIL AND HYDROCHLOROTHIAZIDE 20; 25 MG/1; MG/1
1 TABLET ORAL DAILY
Qty: 90 TABLET | Refills: 1 | Status: SHIPPED | OUTPATIENT
Start: 2018-08-27 | End: 2019-03-11

## 2018-08-27 RX ORDER — ATORVASTATIN CALCIUM 10 MG/1
TABLET, FILM COATED ORAL
Qty: 90 TABLET | Refills: 1 | Status: SHIPPED | OUTPATIENT
Start: 2018-08-27 | End: 2019-03-11

## 2018-10-01 ENCOUNTER — OFFICE VISIT (OUTPATIENT)
Dept: FAMILY MEDICINE | Facility: CLINIC | Age: 68
End: 2018-10-01
Payer: COMMERCIAL

## 2018-10-01 VITALS
WEIGHT: 225 LBS | DIASTOLIC BLOOD PRESSURE: 82 MMHG | SYSTOLIC BLOOD PRESSURE: 112 MMHG | TEMPERATURE: 98.9 F | HEART RATE: 65 BPM | OXYGEN SATURATION: 99 % | HEIGHT: 71 IN | BODY MASS INDEX: 31.5 KG/M2

## 2018-10-01 DIAGNOSIS — I10 ESSENTIAL HYPERTENSION WITH GOAL BLOOD PRESSURE LESS THAN 140/90: ICD-10-CM

## 2018-10-01 DIAGNOSIS — Z23 NEED FOR PROPHYLACTIC VACCINATION AND INOCULATION AGAINST INFLUENZA: ICD-10-CM

## 2018-10-01 DIAGNOSIS — E11.9 TYPE 2 DIABETES MELLITUS WITHOUT COMPLICATION, WITHOUT LONG-TERM CURRENT USE OF INSULIN (H): Primary | ICD-10-CM

## 2018-10-01 DIAGNOSIS — E78.5 HYPERLIPIDEMIA LDL GOAL <100: ICD-10-CM

## 2018-10-01 LAB
ALBUMIN SERPL-MCNC: 4.2 G/DL (ref 3.4–5)
ALP SERPL-CCNC: 48 U/L (ref 40–150)
ALT SERPL W P-5'-P-CCNC: 62 U/L (ref 0–70)
ANION GAP SERPL CALCULATED.3IONS-SCNC: 9 MMOL/L (ref 3–14)
AST SERPL W P-5'-P-CCNC: 48 U/L (ref 0–45)
BILIRUB SERPL-MCNC: 1.1 MG/DL (ref 0.2–1.3)
BUN SERPL-MCNC: 16 MG/DL (ref 7–30)
CALCIUM SERPL-MCNC: 9.4 MG/DL (ref 8.5–10.1)
CHLORIDE SERPL-SCNC: 98 MMOL/L (ref 94–109)
CHOLEST SERPL-MCNC: 170 MG/DL
CO2 SERPL-SCNC: 26 MMOL/L (ref 20–32)
CREAT SERPL-MCNC: 0.89 MG/DL (ref 0.66–1.25)
CREAT UR-MCNC: 77 MG/DL
GFR SERPL CREATININE-BSD FRML MDRD: 84 ML/MIN/1.7M2
GLUCOSE SERPL-MCNC: 170 MG/DL (ref 70–99)
HBA1C MFR BLD: 7.8 % (ref 0–5.6)
HDLC SERPL-MCNC: 41 MG/DL
LDLC SERPL CALC-MCNC: 65 MG/DL
MICROALBUMIN UR-MCNC: 10 MG/L
MICROALBUMIN/CREAT UR: 13.65 MG/G CR (ref 0–17)
NONHDLC SERPL-MCNC: 129 MG/DL
POTASSIUM SERPL-SCNC: 4.1 MMOL/L (ref 3.4–5.3)
PROT SERPL-MCNC: 7.7 G/DL (ref 6.8–8.8)
SODIUM SERPL-SCNC: 133 MMOL/L (ref 133–144)
TRIGL SERPL-MCNC: 321 MG/DL

## 2018-10-01 PROCEDURE — 90662 IIV NO PRSV INCREASED AG IM: CPT | Performed by: FAMILY MEDICINE

## 2018-10-01 PROCEDURE — 99214 OFFICE O/P EST MOD 30 MIN: CPT | Mod: 25 | Performed by: FAMILY MEDICINE

## 2018-10-01 PROCEDURE — G0008 ADMIN INFLUENZA VIRUS VAC: HCPCS | Performed by: FAMILY MEDICINE

## 2018-10-01 PROCEDURE — 99207 C FOOT EXAM  NO CHARGE: CPT | Performed by: FAMILY MEDICINE

## 2018-10-01 PROCEDURE — 36415 COLL VENOUS BLD VENIPUNCTURE: CPT | Performed by: FAMILY MEDICINE

## 2018-10-01 PROCEDURE — 80053 COMPREHEN METABOLIC PANEL: CPT | Performed by: FAMILY MEDICINE

## 2018-10-01 PROCEDURE — 80061 LIPID PANEL: CPT | Performed by: FAMILY MEDICINE

## 2018-10-01 PROCEDURE — 82043 UR ALBUMIN QUANTITATIVE: CPT | Performed by: FAMILY MEDICINE

## 2018-10-01 PROCEDURE — 83036 HEMOGLOBIN GLYCOSYLATED A1C: CPT | Performed by: FAMILY MEDICINE

## 2018-10-01 ASSESSMENT — PAIN SCALES - GENERAL: PAINLEVEL: NO PAIN (0)

## 2018-10-01 NOTE — PROGRESS NOTES
Injectable Influenza Immunization Documentation    1.  Is the person to be vaccinated sick today?   No    2. Does the person to be vaccinated have an allergy to a component   of the vaccine?   No  Egg Allergy Algorithm Link    3. Has the person to be vaccinated ever had a serious reaction   to influenza vaccine in the past?   No    4. Has the person to be vaccinated ever had Guillain-Barré syndrome?   No    Form completed by Herminia Alcantara MA on 10/1/2018 at 1:23 PM

## 2018-10-01 NOTE — MR AVS SNAPSHOT
After Visit Summary   10/1/2018    Rick Sandhu    MRN: [id number]           Patient Information     Date Of Birth          1950        Visit Information        Provider Department      10/1/2018 1:00 PM Keila Muñoz MD [facility]        Today's Diagnoses     Type 2 diabetes mellitus without complication, without long-term current use of insulin (H)    -  1    Essential hypertension with goal blood pressure less than 140/90        Hyperlipidemia LDL goal <100        Need for prophylactic vaccination and inoculation against influenza           Follow-ups after your visit        Follow-up notes from your care team     Return in about 6 months (around 4/1/2019) for Diabetes Recheck with Previsit Labs.      Who to contact     If you have questions or need follow up information about today's clinic visit or your schedule please contact [facility] directly at [phone].  Normal or non-critical lab and imaging results will be communicated to you by MyChart, letter or phone within 4 business days after the clinic has received the results. If you do not hear from us within 7 days, please contact the clinic through [employer] or phone. If you have a critical or abnormal lab result, we will notify you by phone as soon as possible.  Submit refill requests through [employer] or call your pharmacy and they will forward the refill request to us. Please allow 3 business days for your refill to be completed.          Additional Information About Your Visit        MyChart Information     [employer] gives you secure access to your electronic health record. If you see a primary care provider, you can also send messages to your care team and make appointments. If you have questions, please call your primary care clinic.  If you do not have a primary care provider, please call [phone] and they will assist you.        Care EveryWhere ID     This is your Care EveryWhere ID.  "This could be used by other organizations to access your [place] medical records  [id number]        Your Vitals Were     Pulse Temperature Height Pulse Oximetry BMI (Body Mass Index)       65 98.9  F (37.2  C) (Oral) 1.803 m (5' 11\") 99% 31.38 kg/m2        Blood Pressure from Last 3 Encounters:   10/01/18 112/82   03/14/18 104/60   01/02/18 120/74    Weight from Last 3 Encounters:   10/01/18 102.1 kg (225 lb)   03/14/18 103 kg (227 lb)   01/02/18 99.8 kg (220 lb)              We Performed the Following     Albumin Random Urine Quantitative with Creat Ratio     Comprehensive metabolic panel     FLU VACCINE, INCREASED ANTIGEN, PRESV FREE, AGE 65+ [58282]     FOOT EXAM  NO CHARGE [28849.114]     HEMOGLOBIN A1C     Lipid panel reflex to direct LDL Fasting     Vaccine Administration, Initial [16740]        Primary Care Provider Office Phone # Fax #    Keila Donn Muñoz [phone] [address] [facility], [place] [address]        Equal Access to Services     [facility] DI[facility] : Hadii chilango ku hadasho Solety, waaxda luqadaha, qaybta kaalmada hiren, winston coles. So [facility] [phone].    ATENCIÓN: Si habla español, tiene a ames disposición servicios gratuitos de asistencia lingüística. [facility] [phone].    We comply with applicable federal civil rights laws and [place] laws. We do not discriminate on the basis of race, color, national origin, age, disability, sex, sexual orientation, or gender identity.            Thank you!     Thank you for choosing [facility]  for your care. Our goal is always to provide you with excellent care. Hearing back from our patients is one way we can continue to improve our services. Please take a few minutes to complete the written survey that you may receive in the mail after your visit with us. Thank you!             Your Updated Medication List - Protect others around you: Learn how to safely use, store and " throw away your medicines at www.disposemymeds.org.          This list is accurate as of 10/1/18  1:24 PM.  Always use your most recent med list.                   Brand Name Dispense Instructions for use Diagnosis    aspirin 81 MG tablet      1 TABLET DAILY (*)    Type II or unspecified type diabetes mellitus without mention of complication, not stated as uncontrolled       atorvastatin 10 MG tablet    LIPITOR    90 tablet    TAKE 1 TABLET (10 MG) BY MOUTH DAILY    Hyperlipidemia LDL goal <100       FIBER DIET Tabs      Take 2 tablets by mouth daily        glipiZIDE 5 MG 24 hr tablet    GLUCOTROL XL    90 tablet    Take 1 tablet (5 mg) by mouth daily    Type 2 diabetes mellitus without complication, without long-term current use of insulin (H)       lisinopril-hydrochlorothiazide 20-25 MG per tablet    PRINZIDE/ZESTORETIC    90 tablet    Take 1 tablet by mouth daily    Essential hypertension with goal blood pressure less than 140/90       melatonin 3 MG tablet      Take 3 mg by mouth nightly as needed Reported on 2/22/2017        metFORMIN 500 MG tablet    GLUCOPHAGE    180 tablet    Take 1 tablet by mouth twice daily with meals    Type 2 diabetes mellitus without complication, without long-term current use of insulin (H)

## 2018-10-01 NOTE — PROGRESS NOTES
"  SUBJECTIVE:   Rick Sandhu is a 68 year old male who presents to clinic today for the following health issues:      Diabetes Follow-up      Patient is checking blood sugars: not at all    Diabetic concerns: None     Symptoms of hypoglycemia (low blood sugar): none     Paresthesias (numbness or burning in feet) or sores: No     Date of last diabetic eye exam: March 2017    Diabetes Management Resources    Hyperlipidemia Follow-Up      Rate your low fat/cholesterol diet?: not monitoring fat    Taking statin?  Yes, no muscle aches from statin    Other lipid medications/supplements?:  none    Hypertension Follow-up      Outpatient blood pressures are being checked at Pharmacy.  Results are 120/70.    Low Salt Diet: not monitoring salt    BP Readings from Last 2 Encounters:   03/14/18 104/60   01/02/18 120/74     Hemoglobin A1C (%)   Date Value   03/14/2018 7.7 (H)   08/28/2017 7.2 (H)     LDL Cholesterol Calculated (mg/dL)   Date Value   08/28/2017 54   08/19/2016 56       Amount of exercise or physical activity: 2-3 days/week for an average of 15-30 minutes    Problems taking medications regularly: No    Medication side effects: Dry mouth occasionally     Diet: regular (no restrictions)    SUBJECTIVE:  Here today in follow-up of diabetes, hypertension, lipids  Doing well.  Reports no interval health concerns.  Patient reports no side effects from medications, and desires no change in therapy.     Review of systems otherwise negative.  Past medical, family, and social history reviewed and updated in chart.    OBJECTIVE:  /82 (BP Location: Right arm, Patient Position: Chair, Cuff Size: Adult Large)  Pulse 65  Temp 98.9  F (37.2  C) (Oral)  Ht 1.803 m (5' 11\")  Wt 102.1 kg (225 lb)  SpO2 99%  BMI 31.38 kg/m2  Alert, pleasant, upbeat, and in no apparent discomfort.  S1 and S2 normal, no murmurs, clicks, gallops or rubs. Regular rate and rhythm. Chest is clear; no wheezes or rales. No edema or JVD.  FEET: " both sides normal; no swelling, tenderness or skin or vascular lesions.  Sensation is intact. Peripheral pulses are palpable. Toenails are normal.  Past labs reviewed with the patient.     ASSESSMENT / PLAN:  (E11.9) Type 2 diabetes mellitus without complication, without long-term current use of insulin (H)  (primary encounter diagnosis)  Comment: Sugar is been well controlled he would like to see the A1c closer to 7.  Plan: FOOT EXAM  NO CHARGE [53735.114], HEMOGLOBIN         A1C, Lipid panel reflex to direct LDL Fasting,         Albumin Random Urine Quantitative with Creat         Ratio, Comprehensive metabolic panel            (I10) Essential hypertension with goal blood pressure less than 140/90  Comment: Well-controlled on current regimen  Plan:     (E78.5) Hyperlipidemia LDL goal <100  Comment: Controlled and following  Plan:     (Z23) Need for prophylactic vaccination and inoculation against influenza  Comment: Flu shot today  Plan:     Follow up 6 months  MARIELLE Muñoz MD    (Chart documentation completed in part with Dragon voice-recognition software.  Even though reviewed some grammatical, spelling, and word errors may remain.)

## 2019-03-08 DIAGNOSIS — I10 ESSENTIAL HYPERTENSION WITH GOAL BLOOD PRESSURE LESS THAN 140/90: ICD-10-CM

## 2019-03-08 DIAGNOSIS — E11.9 TYPE 2 DIABETES MELLITUS WITHOUT COMPLICATION, WITHOUT LONG-TERM CURRENT USE OF INSULIN (H): ICD-10-CM

## 2019-03-08 RX ORDER — LISINOPRIL AND HYDROCHLOROTHIAZIDE 20; 25 MG/1; MG/1
TABLET ORAL
Qty: 90 TABLET | Refills: 1 | Status: CANCELLED | OUTPATIENT
Start: 2019-03-08

## 2019-03-08 RX ORDER — GLIPIZIDE 5 MG/1
TABLET, FILM COATED, EXTENDED RELEASE ORAL
Qty: 90 TABLET | Refills: 1 | Status: CANCELLED | OUTPATIENT
Start: 2019-03-08

## 2019-03-08 NOTE — TELEPHONE ENCOUNTER
"Requested Prescriptions   Pending Prescriptions Disp Refills     glipiZIDE (GLUCOTROL XL) 5 MG 24 hr tablet [Pharmacy Med Name: GLIPIZIDE ER 5 MG TABLET]  Last Written Prescription Date:  8/27/18  Last Fill Quantity: 90 tablet,  # refills: 1   Last office visit: 10/1/2018 with prescribing provider:  Dr. Muñoz   Future Office Visit:   Next 5 appointments (look out 90 days)    Mar 11, 2019  9:00 AM CDT  Office Visit with Keila Muñoz MD  [facility] [address] [place] [id number] [phone]          90 tablet 1     Sig: TAKE 1 TABLET BY MOUTH EVERY DAY    Sulfonylurea Agents Passed - 3/8/2019  1:52 AM       Passed - Blood pressure less than 140/90 in past 6 months    BP Readings from Last 3 Encounters:   10/01/18 112/82   03/14/18 104/60   01/02/18 120/74                Passed - Patient has documented LDL within the past 12 mos.    Recent Labs   Lab Test 10/01/18  1322   LDL 65            Passed - Patient has had a Microalbumin in the past 12 mos.    Recent Labs   Lab Test 10/01/18  1323   MICROL 10   UMALCR 13.65            Passed - Patient has documented A1c within the specified period of time.    If HgbA1C is 8 or greater, it needs to be on file within the past 3 months.  If less than 8, must be on file within the past 6 months.     Recent Labs   Lab Test 10/01/18  1322   A1C 7.8*            Passed - Medication is active on med list       Passed - Patient is age 18 or older       Passed - Patient has a recent creatinine (normal) within the past 12 mos.    Recent Labs   Lab Test 10/01/18  1322   CR 0.89            Passed - Recent (6 mo) or future (30 days) visit within the authorizing provider's specialty    Patient had office visit in the last 6 months or has a visit in the next 30 days with authorizing provider or within the authorizing provider's specialty.  See \"Patient Info\" tab in inbasket, or \"Choose Columns\" in Meds & Orders " section of the refill encounter.                  metFORMIN (GLUCOPHAGE) 500 MG tablet [Pharmacy Med Name: METFORMIN  MG TABLET]  Last Written Prescription Date:  8/27/18  Last Fill Quantity: 180 tablet,  # refills: 1   Last office visit: 10/1/2018 with prescribing provider:  Dr. Muñoz   Future Office Visit:   Next 5 appointments (look out 90 days)    Mar 11, 2019  9:00 AM CDT  Office Visit with Keila Muñoz MD  [facility] ([address] [id number] [phone]          180 tablet 1     Sig: TAKE 1 TABLET BY MOUTH TWICE A DAY WITH MEALS    Biguanide Agents Passed - 3/8/2019  1:52 AM       Passed - Blood pressure less than 140/90 in past 6 months    BP Readings from Last 3 Encounters:   10/01/18 112/82   03/14/18 104/60   01/02/18 120/74                Passed - Patient has documented LDL within the past 12 mos.    Recent Labs   Lab Test 10/01/18  1322   LDL 65            Passed - Patient has had a Microalbumin in the past 15 mos.    Recent Labs   Lab Test 10/01/18  1323   MICROL 10   UMALCR 13.65            Passed - Patient is age 10 or older       Passed - Patient has documented A1c within the specified period of time.    If HgbA1C is 8 or greater, it needs to be on file within the past 3 months.  If less than 8, must be on file within the past 6 months.     Recent Labs   Lab Test 10/01/18  1322   A1C 7.8*            Passed - Patient's CR is NOT>1.4 OR Patient's EGFR is NOT<45 within past 12 mos.            Recent Labs   Lab Test 10/01/18  1322   GFRESTIMATED 84   GFRESTBLACK >90       Recent Labs   Lab Test 10/01/18  1322   CR 0.89            Passed - Patient does NOT have a diagnosis of CHF.       Passed - Medication is active on med list       Passed - Recent (6 mo) or future (30 days) visit within the authorizing provider's specialty    Patient had office visit in the last 6 months or has a visit in the next 30 days with  "authorizing provider or within the authorizing provider's specialty.  See \"Patient Info\" tab in inbasket, or \"Choose Columns\" in Meds & Orders section of the refill encounter.                lisinopril-hydrochlorothiazide (PRINZIDE/ZESTORETIC) 20-25 MG tablet [Pharmacy Med Name: LISINOPRIL-HCTZ 20-25 MG TAB]  Last Written Prescription Date:  8/27/18  Last Fill Quantity: 90 tablet,  # refills: 1   Last office visit: 10/1/2018 with prescribing provider:  Dr. Muñoz   Future Office Visit:   Next 5 appointments (look out 90 days)    Mar 11, 2019  9:00 AM CDT  Office Visit with Keila [facility]oz [facility]  [facility] [address] [place] [id number] [phone]          90 tablet 1     Sig: TAKE 1 TABLET BY MOUTH EVERY DAY    Diuretics (Including Combos) Protocol Passed - 3/8/2019  1:52 AM       Passed - Blood pressure under 140/90 in past 12 months    BP Readings from Last 3 Encounters:   10/01/18 112/82   03/14/18 104/60   01/02/18 120/74                Passed - Recent (12 mo) or future (30 days) visit within the authorizing provider's specialty    Patient had office visit in the last 12 months or has a visit in the next 30 days with authorizing provider or within the authorizing provider's specialty.  See \"Patient Info\" tab in inbasket, or \"Choose Columns\" in Meds & Orders section of the refill encounter.             Passed - Medication is active on med list       Passed - Patient is age 18 or older       Passed - Normal serum creatinine on file in past 12 months    Recent Labs   Lab Test 10/01/18  1322   CR 0.89             Passed - Normal serum potassium on file in past 12 months    Recent Labs   Lab Test 10/01/18  1322   POTASSIUM 4.1                   Passed - Normal serum sodium on file in past 12 months    Recent Labs   Lab Test 10/01/18  1322                   "

## 2019-03-11 ENCOUNTER — OFFICE VISIT (OUTPATIENT)
Dept: FAMILY MEDICINE | Facility: CLINIC | Age: 69
End: 2019-03-11
Payer: COMMERCIAL

## 2019-03-11 VITALS
OXYGEN SATURATION: 98 % | WEIGHT: 226 LBS | HEART RATE: 56 BPM | HEIGHT: 71 IN | SYSTOLIC BLOOD PRESSURE: 128 MMHG | BODY MASS INDEX: 31.64 KG/M2 | DIASTOLIC BLOOD PRESSURE: 74 MMHG | TEMPERATURE: 98.2 F

## 2019-03-11 DIAGNOSIS — E11.9 TYPE 2 DIABETES MELLITUS WITHOUT COMPLICATION, WITHOUT LONG-TERM CURRENT USE OF INSULIN (H): ICD-10-CM

## 2019-03-11 DIAGNOSIS — E78.5 HYPERLIPIDEMIA LDL GOAL <100: ICD-10-CM

## 2019-03-11 DIAGNOSIS — I10 ESSENTIAL HYPERTENSION WITH GOAL BLOOD PRESSURE LESS THAN 140/90: ICD-10-CM

## 2019-03-11 LAB
ALBUMIN SERPL-MCNC: 4 G/DL (ref 3.4–5)
ALP SERPL-CCNC: 45 U/L (ref 40–150)
ALT SERPL W P-5'-P-CCNC: 36 U/L (ref 0–70)
ANION GAP SERPL CALCULATED.3IONS-SCNC: 7 MMOL/L (ref 3–14)
AST SERPL W P-5'-P-CCNC: 18 U/L (ref 0–45)
BILIRUB SERPL-MCNC: 0.9 MG/DL (ref 0.2–1.3)
BUN SERPL-MCNC: 14 MG/DL (ref 7–30)
CALCIUM SERPL-MCNC: 9.2 MG/DL (ref 8.5–10.1)
CHLORIDE SERPL-SCNC: 100 MMOL/L (ref 94–109)
CO2 SERPL-SCNC: 29 MMOL/L (ref 20–32)
CREAT SERPL-MCNC: 0.82 MG/DL (ref 0.66–1.25)
GFR SERPL CREATININE-BSD FRML MDRD: 89 ML/MIN/{1.73_M2}
GLUCOSE SERPL-MCNC: 187 MG/DL (ref 70–99)
HBA1C MFR BLD: 7.7 % (ref 0–5.6)
POTASSIUM SERPL-SCNC: 4.2 MMOL/L (ref 3.4–5.3)
PROT SERPL-MCNC: 7.1 G/DL (ref 6.8–8.8)
SODIUM SERPL-SCNC: 136 MMOL/L (ref 133–144)

## 2019-03-11 PROCEDURE — 36415 COLL VENOUS BLD VENIPUNCTURE: CPT | Performed by: FAMILY MEDICINE

## 2019-03-11 PROCEDURE — 99214 OFFICE O/P EST MOD 30 MIN: CPT | Performed by: FAMILY MEDICINE

## 2019-03-11 PROCEDURE — 80053 COMPREHEN METABOLIC PANEL: CPT | Performed by: FAMILY MEDICINE

## 2019-03-11 PROCEDURE — 83036 HEMOGLOBIN GLYCOSYLATED A1C: CPT | Performed by: FAMILY MEDICINE

## 2019-03-11 RX ORDER — LISINOPRIL AND HYDROCHLOROTHIAZIDE 20; 25 MG/1; MG/1
1 TABLET ORAL DAILY
Qty: 90 TABLET | Refills: 1 | Status: SHIPPED | OUTPATIENT
Start: 2019-03-11 | End: 2019-09-03

## 2019-03-11 RX ORDER — ATORVASTATIN CALCIUM 10 MG/1
TABLET, FILM COATED ORAL
Qty: 90 TABLET | Refills: 1 | Status: SHIPPED | OUTPATIENT
Start: 2019-03-11 | End: 2019-07-22

## 2019-03-11 RX ORDER — GLIPIZIDE 5 MG/1
5 TABLET, FILM COATED, EXTENDED RELEASE ORAL DAILY
Qty: 90 TABLET | Refills: 1 | Status: SHIPPED | OUTPATIENT
Start: 2019-03-11 | End: 2019-09-03

## 2019-03-11 ASSESSMENT — PAIN SCALES - GENERAL: PAINLEVEL: NO PAIN (0)

## 2019-03-11 ASSESSMENT — MIFFLIN-ST. JEOR: SCORE: 1807.26

## 2019-03-11 NOTE — PROGRESS NOTES
"  SUBJECTIVE:   Rick Sandhu is a 69 year old male who presents to clinic today for the following health issues:      Diabetes Follow-up      Patient is checking blood sugars: not at all    Diabetic concerns: None     Symptoms of hypoglycemia (low blood sugar): none     Paresthesias (numbness or burning in feet) or sores: No     Date of last diabetic eye exam: March 2018    Diabetes Management Resources    Hyperlipidemia Follow-Up      Rate your low fat/cholesterol diet?: fair    Taking statin?  Yes, no muscle aches from statin    Other lipid medications/supplements?:  none    Hypertension Follow-up      Outpatient blood pressures are not being checked.    Low Salt Diet: not monitoring salt    BP Readings from Last 2 Encounters:   10/01/18 112/82   03/14/18 104/60     Hemoglobin A1C (%)   Date Value   10/01/2018 7.8 (H)   03/14/2018 7.7 (H)     LDL Cholesterol Calculated (mg/dL)   Date Value   10/01/2018 65   08/28/2017 54       Amount of exercise or physical activity: 2-3 days/week for an average of 45-60 minutes    Problems taking medications regularly: No    Medication side effects: none    Diet: low salt and low fat/cholesterol    SUBJECTIVE:  Here today in follow-up of diabetes, hypertension, lipids  Doing well.  Reports no interval health concerns.    Patient reports no side effects from medications, and desires no change in therapy.     Review of systems otherwise negative.  Past medical, family, and social history reviewed and updated in chart.    OBJECTIVE:  /74 (BP Location: Right arm, Patient Position: Chair, Cuff Size: Adult Large)   Pulse 56   Temp 98.2  F (36.8  C) (Oral)   Ht 1.803 m (5' 11\")   Wt 102.5 kg (226 lb)   SpO2 98%   BMI 31.52 kg/m    Alert, pleasant, upbeat, and in no apparent discomfort.  S1 and S2 normal, no murmurs, clicks, gallops or rubs. Regular rate and rhythm. Chest is clear; no wheezes or rales. No edema or JVD.  Past labs reviewed with the patient.     ASSESSMENT / " PLAN:  (E11.9) Type 2 diabetes mellitus without complication, without long-term current use of insulin (H)  Comment: Has been well controlled.  Recheck A1c and adjust as needed  Plan: HEMOGLOBIN A1C, glipiZIDE (GLUCOTROL XL) 5 MG         24 hr tablet, Comprehensive metabolic panel            (I10) Essential hypertension with goal blood pressure less than 140/90  Comment: Blood pressure under good control on current regimen.  Continue same  Plan: lisinopril-hydrochlorothiazide         (PRINZIDE/ZESTORETIC) 20-25 MG tablet            (E78.5) Hyperlipidemia LDL goal <100  Comment: LDL well controlled on low-dose statin therapy  Plan: atorvastatin (LIPITOR) 10 MG tablet            Follow up 6 months  MARIELLE Muñoz MD    (Chart documentation completed in part with Dragon voice-recognition software.  Even though reviewed some grammatical, spelling, and word errors may remain.)

## 2019-04-15 ENCOUNTER — OFFICE VISIT (OUTPATIENT)
Dept: OPHTHALMOLOGY | Facility: CLINIC | Age: 69
End: 2019-04-15
Payer: COMMERCIAL

## 2019-04-15 DIAGNOSIS — H25.813 COMBINED FORMS OF AGE-RELATED CATARACT OF BOTH EYES: ICD-10-CM

## 2019-04-15 DIAGNOSIS — H43.813 POSTERIOR VITREOUS DETACHMENT, BILATERAL: ICD-10-CM

## 2019-04-15 DIAGNOSIS — E11.9 TYPE 2 DIABETES MELLITUS WITHOUT COMPLICATION, WITHOUT LONG-TERM CURRENT USE OF INSULIN (H): Primary | ICD-10-CM

## 2019-04-15 DIAGNOSIS — H52.4 PRESBYOPIA: ICD-10-CM

## 2019-04-15 PROCEDURE — 92014 COMPRE OPH EXAM EST PT 1/>: CPT | Performed by: OPHTHALMOLOGY

## 2019-04-15 PROCEDURE — 92015 DETERMINE REFRACTIVE STATE: CPT | Performed by: OPHTHALMOLOGY

## 2019-04-15 ASSESSMENT — REFRACTION_WEARINGRX
OS_ADD: +2.75
OD_AXIS: 075
OS_SPHERE: -5.00
OS_AXIS: 097
OS_CYLINDER: +1.50
SPECS_TYPE: PAL
OD_CYLINDER: +1.50
OD_ADD: +2.75
OD_SPHERE: -6.00

## 2019-04-15 ASSESSMENT — CONF VISUAL FIELD
OS_NORMAL: 1
OD_NORMAL: 1

## 2019-04-15 ASSESSMENT — EXTERNAL EXAM - LEFT EYE: OS_EXAM: 2+ BROW PTOSIS, ROSACEA

## 2019-04-15 ASSESSMENT — REFRACTION_MANIFEST
OD_ADD: +2.75
OD_AXIS: 073
OD_CYLINDER: +1.25
OS_SPHERE: -5.00
OS_ADD: +2.75
OD_SPHERE: -6.00
OS_CYLINDER: +1.25
OS_AXIS: 097

## 2019-04-15 ASSESSMENT — CUP TO DISC RATIO
OD_RATIO: 0.3
OS_RATIO: 0.3

## 2019-04-15 ASSESSMENT — VISUAL ACUITY
OS_CC: 20/25
CORRECTION_TYPE: GLASSES
OD_CC: 20/25-2
METHOD: SNELLEN - LINEAR

## 2019-04-15 ASSESSMENT — TONOMETRY
IOP_METHOD: APPLANATION
OS_IOP_MMHG: 19
OD_IOP_MMHG: 20

## 2019-04-15 ASSESSMENT — EXTERNAL EXAM - RIGHT EYE: OD_EXAM: 2+ BROW PTOSIS, ROSACEA

## 2019-04-15 NOTE — PROGRESS NOTES
Current Eye Medications:  no     Subjective:  DIABETIC EYE EXAM   Pt reports his distance vision seems a little more blurred.    Lab Results   Component Value Date    A1C 7.7 03/11/2019    A1C 7.8 10/01/2018    A1C 7.7 03/14/2018    A1C 7.2 08/28/2017    A1C 7.4 02/22/2017        Objective:  See Ophthalmology Exam.       Assessment:  Stable mild-moderate cataract both eyes.  No diabetic retinopathy.      ICD-10-CM    1. Type 2 diabetes mellitus without complication, without long-term current use of insulin (H) E11.9 EYE EXAM (SIMPLE-NONBILLABLE)   2. Combined forms of age-related cataract, mild-mod, of both eyes H25.813    3. Posterior vitreous detachment, bilateral H43.813    4. Presbyopia H52.4 REFRACTIVE STATUS        Plan:  Glasses Rx given - optional.  Use artificial tears up to 4 times daily both eyes.  (Refresh Tears, Systane Ultra/Balance, or Theratears)  Call in December 2019 for an appointment in April 2020 for Complete Exam.    Dr. Bedolla [phone]

## 2019-04-15 NOTE — PATIENT INSTRUCTIONS
Glasses Rx given - optional.  Use artificial tears up to 4 times daily both eyes.  (Refresh Tears, Systane Ultra/Balance, or Theratears)  Call in December 2019 for an appointment in April 2020 for Complete Exam.    Dr. Bedolla [phone]    Patient Education   Diabetes weakens the blood vessels all over the body, including the eyes. Damage to the blood vessels in the eyes can cause swelling or bleeding into part of the eye (called the retina). This is called diabetic retinopathy ([facility]-tin--[facility]-the). If not treated, this disease can cause vision loss or blindness.   Symptoms may include blurred or distorted vision, but many people have no symptoms. It's important to see your eye doctor regularly to check for problems.   Early treatment and good control can help protect your vision. Here are the things you can do to help prevent vision loss:      1. Keep your blood sugar levels under tight control.      2. Bring high blood pressure under control.      3. No smoking.      4. Have yearly dilated eye exams.

## 2019-04-15 NOTE — LETTER
4/15/2019         RE: Rick Sandhu  [address] [facility] [address]        Dear Colleague,    Thank you for referring your patient, Rick Sandhu, to the [facility]. Please see a copy of my visit note below.     Current Eye Medications:  no     Subjective:  DIABETIC EYE EXAM   Pt reports his distance vision seems a little more blurred.    Lab Results   Component Value Date    A1C 7.7 03/11/2019    A1C 7.8 10/01/2018    A1C 7.7 03/14/2018    A1C 7.2 08/28/2017    A1C 7.4 02/22/2017        Objective:  See Ophthalmology Exam.       Assessment:  Stable mild-moderate cataract both eyes.  No diabetic retinopathy.      ICD-10-CM    1. Type 2 diabetes mellitus without complication, without long-term current use of insulin (H) E11.9 EYE EXAM (SIMPLE-NONBILLABLE)   2. Combined forms of age-related cataract, mild-mod, of both eyes H25.813    3. Posterior vitreous detachment, bilateral H43.813    4. Presbyopia H52.4 REFRACTIVE STATUS        Plan:  Glasses Rx given - optional.  Use artificial tears up to 4 times daily both eyes.  (Refresh Tears, Systane Ultra/Balance, or Theratears)  Call in December 2019 for an appointment in April 2020 for Complete Exam.    Dr. Bedolla [phone]           Again, thank you for allowing me to participate in the care of your patient.        Sincerely,        Man Bedolla MD

## 2019-07-05 NOTE — PROGRESS NOTES
Current Eye Medications: no      Subjective:  Diabetic eye exam  Patient reports is seeing well, vision seems unchanged and states eyes seem otherwise fine.      Lab Results   Component Value Date    A1C 7.4 02/22/2017    A1C 7.3 08/19/2016    A1C 7.2 02/17/2016    A1C 6.7 07/08/2015    A1C 9.0 11/26/2014        Objective:  See Ophthalmology Exam.       Assessment:  Mild cataract both eyes.  No diabetic retinopathy.      ICD-10-CM    1. Type 2 diabetes mellitus without complication, without long-term current use of insulin (H) E11.9 EYE EXAM (SIMPLE-NONBILLABLE)   2. Cataract, mild, ou H26.9    3. Rosacea L71.9    4. Posterior vitreous detachment, bilateral H43.813    5. Presbyopia H52.4 REFRACTIVE STATUS        Plan: Glasses Rx given -optional   Call in November 2017 for an appointment in March 2018 for Complete Exam    Dr. Bedolla [phone]        PLEASE NOTE--Encounter / Re-Admission Within 30 Days  This patient has had another encounter or admission within the last 30 days. Reason for Admission:    Alcohol withdrawal            RRAT Score: 12            Plan for utilizing home health:  None at this time                   Likelihood of Readmission:  Moderate per patient RRAT score. Patient however is at a high risk for Re-admit related to alcohol withdrawal                   Transition of Care Plan:   CM contact David Roldan patient has an appointment today at 26. Provider did not want to change this appointment. States they have been trying to get patient to come to the office. Inform patient discharge today ask CM to arrange transportation from the hospital to the doctor office for the appointment today. 1023) CM inform patient of doctors office request to arrange transportation to see the provider. Patient states no his ride is able to take him straight there. CM ask patient since provider office ask CM to arrange transportation would rather set it up. Patient decline again states \"no\" but I will go to the office for my appointment today. IMM notification sign patient chart. Care Management Interventions  PCP Verified by CM: Yes(patient has appoint today PCP wants to see patient today @ [phone])  Mode of Transport at Discharge: Self(friend)  Transition of Care Consult (CM Consult): Discharge Planning  Current Support Network:  Adult Group Home  Confirm Follow Up Transport: Self  Plan discussed with Pt/Family/Caregiver: Yes  Freedom of Choice Offered: Yes  Discharge Location  Discharge Placement: [address]  [phone]

## 2019-07-22 DIAGNOSIS — E78.5 HYPERLIPIDEMIA LDL GOAL <100: ICD-10-CM

## 2019-07-22 NOTE — TELEPHONE ENCOUNTER
"Requested Prescriptions   Pending Prescriptions Disp Refills     atorvastatin (LIPITOR) 10 MG tablet [Pharmacy Med Name: ATORVASTATIN 10 MG TABLET] 90 tablet 1     Sig: TAKE 1 TABLET BY MOUTH EVERY DAY       Statins Protocol Passed - 7/22/2019  1:43 AM        Passed - LDL on file in past 12 months     Recent Labs   Lab Test 10/01/18  1322   LDL 65             Passed - No abnormal creatine kinase in past 12 months     No lab results found.             Passed - Recent (12 mo) or future (30 days) visit within the authorizing provider's specialty     Patient had office visit in the last 12 months or has a visit in the next 30 days with authorizing provider or within the authorizing provider's specialty.  See \"Patient Info\" tab in inbasket, or \"Choose Columns\" in Meds & Orders section of the refill encounter.              Passed - Medication is active on med list        Passed - Patient is age 18 or older        atorvastatin (LIPITOR) 10 MG tablet  Last Written Prescription Date:  3/11/19  Last Fill Quantity: 90,  # refills: 1   Last office visit: 3/11/2019 with prescribing provider:  Dr. Muñoz   Future Office Visit:      "

## 2019-07-23 RX ORDER — ATORVASTATIN CALCIUM 10 MG/1
TABLET, FILM COATED ORAL
Qty: 90 TABLET | Refills: 0 | Status: SHIPPED | OUTPATIENT
Start: 2019-07-23 | End: 2019-09-03

## 2019-07-23 NOTE — TELEPHONE ENCOUNTER
Prescription approved per [facility].  Due for FLP by 10/1/19    Yanni Smith RN

## 2019-08-19 ENCOUNTER — DOCUMENTATION ONLY (OUTPATIENT)
Dept: LAB | Facility: CLINIC | Age: 69
End: 2019-08-19

## 2019-08-19 DIAGNOSIS — Z12.5 SCREENING FOR PROSTATE CANCER: ICD-10-CM

## 2019-08-19 DIAGNOSIS — E11.9 TYPE 2 DIABETES MELLITUS WITHOUT COMPLICATION, WITHOUT LONG-TERM CURRENT USE OF INSULIN (H): Primary | ICD-10-CM

## 2019-08-27 DIAGNOSIS — E11.9 TYPE 2 DIABETES MELLITUS WITHOUT COMPLICATION, WITHOUT LONG-TERM CURRENT USE OF INSULIN (H): ICD-10-CM

## 2019-08-27 DIAGNOSIS — Z12.5 SCREENING FOR PROSTATE CANCER: ICD-10-CM

## 2019-08-27 LAB
ALBUMIN SERPL-MCNC: 3.8 G/DL (ref 3.4–5)
ALP SERPL-CCNC: 40 U/L (ref 40–150)
ALT SERPL W P-5'-P-CCNC: 46 U/L (ref 0–70)
ANION GAP SERPL CALCULATED.3IONS-SCNC: 9 MMOL/L (ref 3–14)
AST SERPL W P-5'-P-CCNC: 27 U/L (ref 0–45)
BILIRUB SERPL-MCNC: 0.8 MG/DL (ref 0.2–1.3)
BUN SERPL-MCNC: 13 MG/DL (ref 7–30)
CALCIUM SERPL-MCNC: 9 MG/DL (ref 8.5–10.1)
CHLORIDE SERPL-SCNC: 99 MMOL/L (ref 94–109)
CHOLEST SERPL-MCNC: 164 MG/DL
CO2 SERPL-SCNC: 28 MMOL/L (ref 20–32)
CREAT SERPL-MCNC: 0.85 MG/DL (ref 0.66–1.25)
CREAT UR-MCNC: 149 MG/DL
GFR SERPL CREATININE-BSD FRML MDRD: 88 ML/MIN/{1.73_M2}
GLUCOSE SERPL-MCNC: 163 MG/DL (ref 70–99)
HBA1C MFR BLD: 7.8 % (ref 0–5.6)
HDLC SERPL-MCNC: 42 MG/DL
LDLC SERPL CALC-MCNC: 63 MG/DL
MICROALBUMIN UR-MCNC: 11 MG/L
MICROALBUMIN/CREAT UR: 7.11 MG/G CR (ref 0–17)
NONHDLC SERPL-MCNC: 122 MG/DL
POTASSIUM SERPL-SCNC: 3.9 MMOL/L (ref 3.4–5.3)
PROT SERPL-MCNC: 6.8 G/DL (ref 6.8–8.8)
PSA SERPL-ACNC: 1.65 UG/L (ref 0–4)
SODIUM SERPL-SCNC: 136 MMOL/L (ref 133–144)
TRIGL SERPL-MCNC: 295 MG/DL
TSH SERPL DL<=0.005 MIU/L-ACNC: 2.68 MU/L (ref 0.4–4)

## 2019-08-27 PROCEDURE — 80061 LIPID PANEL: CPT | Performed by: FAMILY MEDICINE

## 2019-08-27 PROCEDURE — 36415 COLL VENOUS BLD VENIPUNCTURE: CPT | Performed by: FAMILY MEDICINE

## 2019-08-27 PROCEDURE — 83036 HEMOGLOBIN GLYCOSYLATED A1C: CPT | Performed by: FAMILY MEDICINE

## 2019-08-27 PROCEDURE — G0103 PSA SCREENING: HCPCS | Performed by: FAMILY MEDICINE

## 2019-08-27 PROCEDURE — 82043 UR ALBUMIN QUANTITATIVE: CPT | Performed by: FAMILY MEDICINE

## 2019-08-27 PROCEDURE — 84443 ASSAY THYROID STIM HORMONE: CPT | Performed by: FAMILY MEDICINE

## 2019-08-27 PROCEDURE — 80053 COMPREHEN METABOLIC PANEL: CPT | Performed by: FAMILY MEDICINE

## 2019-09-03 ENCOUNTER — OFFICE VISIT (OUTPATIENT)
Dept: FAMILY MEDICINE | Facility: CLINIC | Age: 69
End: 2019-09-03
Payer: COMMERCIAL

## 2019-09-03 VITALS
SYSTOLIC BLOOD PRESSURE: 124 MMHG | HEIGHT: 71 IN | WEIGHT: 227 LBS | BODY MASS INDEX: 31.78 KG/M2 | OXYGEN SATURATION: 98 % | TEMPERATURE: 98.9 F | HEART RATE: 61 BPM | DIASTOLIC BLOOD PRESSURE: 68 MMHG

## 2019-09-03 DIAGNOSIS — E78.5 HYPERLIPIDEMIA LDL GOAL <100: ICD-10-CM

## 2019-09-03 DIAGNOSIS — M75.41 IMPINGEMENT SYNDROME OF SHOULDER REGION, RIGHT: ICD-10-CM

## 2019-09-03 DIAGNOSIS — E11.9 TYPE 2 DIABETES MELLITUS WITHOUT COMPLICATION, WITHOUT LONG-TERM CURRENT USE OF INSULIN (H): ICD-10-CM

## 2019-09-03 DIAGNOSIS — M76.71 PERONEAL TENDONITIS, RIGHT: ICD-10-CM

## 2019-09-03 DIAGNOSIS — Z00.00 ROUTINE GENERAL MEDICAL EXAMINATION AT A HEALTH CARE FACILITY: Primary | ICD-10-CM

## 2019-09-03 DIAGNOSIS — I10 ESSENTIAL HYPERTENSION WITH GOAL BLOOD PRESSURE LESS THAN 140/90: ICD-10-CM

## 2019-09-03 PROCEDURE — 99397 PER PM REEVAL EST PAT 65+ YR: CPT | Performed by: FAMILY MEDICINE

## 2019-09-03 RX ORDER — LISINOPRIL AND HYDROCHLOROTHIAZIDE 20; 25 MG/1; MG/1
1 TABLET ORAL DAILY
Qty: 90 TABLET | Refills: 1 | Status: SHIPPED | OUTPATIENT
Start: 2019-09-03 | End: 2020-03-03

## 2019-09-03 RX ORDER — GLIPIZIDE 5 MG/1
5 TABLET, FILM COATED, EXTENDED RELEASE ORAL DAILY
Qty: 90 TABLET | Refills: 1 | Status: SHIPPED | OUTPATIENT
Start: 2019-09-03 | End: 2020-03-03

## 2019-09-03 RX ORDER — ATORVASTATIN CALCIUM 10 MG/1
10 TABLET, FILM COATED ORAL DAILY
Qty: 90 TABLET | Refills: 1 | Status: SHIPPED | OUTPATIENT
Start: 2019-09-03 | End: 2020-03-03

## 2019-09-03 ASSESSMENT — MIFFLIN-ST. JEOR: SCORE: 1811.8

## 2019-09-03 NOTE — PROGRESS NOTES
"  SUBJECTIVE:   Rick Sandhu is a 69 year old male who presents for Preventive Visit.      Are you in the first 12 months of your Medicare Part B coverage?  No    Physical Health:    In general, how would you rate your overall physical health? good    Outside of work, how many days during the week do you exercise? 2-3 days/week    Outside of work, approximately how many minutes a day do you exercise?45-60 minutes    If you drink alcohol do you typically have >3 drinks per day or >7 drinks per week? No    Do you usually eat at least 4 servings of fruit and vegetables a day, include whole grains & fiber and avoid regularly eating high fat or \"junk\" foods? Yes    Do you have any problems taking medications regularly?  No    Do you have any side effects from medications? none    Needs assistance for the following daily activities: no assistance needed    Which of the following safety concerns are present in your home?  none identified     Hearing impairment: Yes, hard of hearing     In the past 6 months, have you been bothered by leaking of urine? no    Mental Health:    In general, how would you rate your overall mental or emotional health? good  PHQ-2 Score:      Do you feel safe in your environment? No    Do you have a Health Care Directive? No: Advance care planning reviewed with patient; information given to patient to review.    Additional concerns to address?  No    Fall risk:  Fallen 2 or more times in the past year?: No  Any fall with injury in the past year?: No    Cognitive Screenin) Repeat 3 items (Leader, Season, Table)    2) Clock draw: NORMAL  3) 3 item recall: Recalls 3 objects  Results: 3 items recalled: COGNITIVE IMPAIRMENT LESS LIKELY    Mini-CogTM Copyright JEAN Mendoza. Licensed by the author for use in [facility]; reprinted with permission (greg@.[facility]). All rights reserved.      Do you have sleep apnea, excessive snoring or daytime drowsiness?: yes            Reviewed and " updated as needed this visit by clinical staff  Tobacco  Allergies  Meds         Reviewed and updated as needed this visit by Provider        Social History     Tobacco Use     Smoking status: Former Smoker     Last attempt to quit: 1986     Years since quittin.6     Smokeless tobacco: Never Used   Substance Use Topics     Alcohol use: Yes     Alcohol/week: 0.0 oz     Comment: 1-2 drinks every other week                           Current providers sharing in care for this patient include:   Patient Care Team:  Keila Muñoz MD as PCP - General (Family Practice)  Keila Muñoz MD as Assigned PCP    The following health maintenance items are reviewed in Epic and correct as of today:  Health Maintenance   Topic Date Due     ZOSTER IMMUNIZATION (1 of 2) 1999     ADVANCE CARE PLANNING  2016     MEDICARE ANNUAL WELLNESS VISIT  2017     PNEUMOCOCCAL IMMUNIZATION 65+ LOW/MEDIUM RISK (2 of 2 - PPSV23) 2018     INFLUENZA VACCINE (1) 2019     DIABETIC FOOT EXAM  10/01/2019     A1C  2020     FALL RISK ASSESSMENT  2020     EYE EXAM  04/15/2020     BMP  2020     LIPID  2020     MICROALBUMIN  2020     TSH W/FREE T4 REFLEX  2021     COLONOSCOPY  2026     DTAP/TDAP/TD IMMUNIZATION (3 - Td) 2027     HEPATITIS C SCREENING  Completed     PHQ-2  Completed     AORTIC ANEURYSM SCREENING (SYSTEM ASSIGNED)  Completed     IPV IMMUNIZATION  Aged Out     MENINGITIS IMMUNIZATION  Aged Out     Labs reviewed in EPIC  BP Readings from Last 3 Encounters:   19 124/68   19 128/74   10/01/18 112/82    Wt Readings from Last 3 Encounters:   19 103 kg (227 lb)   19 102.5 kg (226 lb)   10/01/18 102.1 kg (225 lb)         Here today for routine annual wellness visit.  Recent lab work for diabetes looked fantastic.  Patient has been keeping up with exercise at the gym and a sensible diet.  Has had rotator cuff repair  "bilaterally with a repeat procedure on the right in 2010.  Starting to have some stiffness of his right shoulder.  Also notes a couple of months of lateral right foot/ankle pain without injury.    ROS:  CONSTITUTIONAL: NEGATIVE for fever, chills, change in weight  INTEGUMENTARY/SKIN: NEGATIVE for worrisome rashes, moles or lesions  EYES: NEGATIVE for vision changes or irritation  ENT/MOUTH: NEGATIVE for ear, mouth and throat problems  RESP: NEGATIVE for significant cough or SOB  BREAST: NEGATIVE for masses, tenderness or discharge  CV: NEGATIVE for chest pain, palpitations or peripheral edema  GI: NEGATIVE for nausea, abdominal pain, heartburn, or change in bowel habits  : NEGATIVE for frequency, dysuria, or hematuria  MUSCULOSKELETAL: As above  NEURO: NEGATIVE for weakness, dizziness or paresthesias  ENDOCRINE: NEGATIVE for temperature intolerance, skin/hair changes  HEME: NEGATIVE for bleeding problems  PSYCHIATRIC: NEGATIVE for changes in mood or affect    OBJECTIVE:   /68 (BP Location: Right arm, Patient Position: Chair, Cuff Size: Adult Regular)   Pulse 61   Temp 98.9  F (37.2  C) (Oral)   Ht 1.803 m (5' 11\")   Wt 103 kg (227 lb)   SpO2 98%   BMI 31.66 kg/m   Estimated body mass index is 31.66 kg/m  as calculated from the following:    Height as of this encounter: 1.803 m (5' 11\").    Weight as of this encounter: 103 kg (227 lb).  EXAM:   GENERAL: healthy, alert and no distress  EYES: Eyes grossly normal to inspection, PERRL and conjunctivae and sclerae normal  HENT: ear canals and TM's normal, nose and mouth without ulcers or lesions  NECK: no adenopathy, no asymmetry, masses, or scars and thyroid normal to palpation  RESP: lungs clear to auscultation - no rales, rhonchi or wheezes  CV: regular rate and rhythm, normal S1 S2, no S3 or S4, no murmur, click or rub, no peripheral edema and peripheral pulses strong  ABDOMEN: soft, nontender, no hepatosplenomegaly, no masses and bowel sounds " normal  MS: Slightly hesitant elevation on the right but rotator cuff seems strong and stable.  He is tender on the lateral aspect of his right foot consistent with tendon insertion  SKIN: no suspicious lesions or rashes  NEURO: Normal strength and tone, mentation intact and speech normal  PSYCH: mentation appears normal, affect normal/bright    Diagnostic Test Results:  Labs reviewed in Epic    ASSESSMENT / PLAN:   1. Routine general medical examination at a health care facility  Routine health maintenance up-to-date.  Suggested update of PPSV23 with upcoming flu shot    2. Type 2 diabetes mellitus without complication, without long-term current use of insulin (H)    - glipiZIDE (GLUCOTROL XL) 5 MG 24 hr tablet; Take 1 tablet (5 mg) by mouth daily  Dispense: 90 tablet; Refill: 1  - metFORMIN (GLUCOPHAGE) 500 MG tablet; TAKE 1 TABLET BY MOUTH TWICE A DAY WITH MEALS  Dispense: 180 tablet; Refill: 1    3. Essential hypertension with goal blood pressure less than 140/90    - lisinopril-hydrochlorothiazide (PRINZIDE/ZESTORETIC) 20-25 MG tablet; Take 1 tablet by mouth daily  Dispense: 90 tablet; Refill: 1    4. Hyperlipidemia LDL goal <100    - atorvastatin (LIPITOR) 10 MG tablet; Take 1 tablet (10 mg) by mouth daily  Dispense: 90 tablet; Refill: 1    5. Peroneal tendonitis, right  Exercises provided     6. Impingement syndrome of shoulder region, right  Discussed range of motion exercises      End of Life Planning:  Patient currently has an advanced directive: No.  I have verified the patient's ablity to prepare an advanced directive/make health care decisions.  Literature was provided to assist patient in preparing an advanced directive.    COUNSELING:  Reviewed preventive health counseling, as reflected in patient instructions       Regular exercise       Healthy diet/nutrition       Vision screening       Colon cancer screening       Prostate cancer screening    Estimated body mass index is 31.66 kg/m  as calculated  "from the following:    Height as of this encounter: 1.803 m (5' 11\").    Weight as of this encounter: 103 kg (227 lb).         reports that he quit smoking about 33 years ago. He has never used smokeless tobacco.      Appropriate preventive services were discussed with this patient, including applicable screening as appropriate for cardiovascular disease, diabetes, osteopenia/osteoporosis, and glaucoma.  As appropriate for age/gender, discussed screening for colorectal cancer, prostate cancer, breast cancer, and cervical cancer. Checklist reviewing preventive services available has been given to the patient.    Reviewed patients plan of care and provided an AVS. The Basic Care Plan (routine screening as documented in Health Maintenance) for Rick meets the Care Plan requirement. This Care Plan has been established and reviewed with the Patient.    Counseling Resources:  ATP IV Guidelines  Pooled Cohorts Equation Calculator  Breast Cancer Risk Calculator  FRAX Risk Assessment  ICSI Preventive Guidelines  Dietary Guidelines for Americans, 2010  USDA's MyPlate  ASA Prophylaxis  Lung CA Screening    Keila Muñoz MD  [facility]  "

## 2019-09-03 NOTE — PATIENT INSTRUCTIONS
Preventive Health Recommendations:     See your health care provider every year to    Review health changes.     Discuss preventive care.      Review your medicines if your doctor has prescribed any.      Talk with your health care provider about whether you should have a test to screen for prostate cancer (PSA).    Every 3 years, have a diabetes test (fasting glucose). If you are at risk for diabetes, you should have this test more often.    Every 5 years, have a cholesterol test. Have this test more often if you are at risk for high cholesterol or heart disease.     Every 10 years, have a colonoscopy. Or, have a yearly FIT test (stool test). These exams will check for colon cancer.    Talk to with your health care provider about screening for Abdominal Aortic Aneurysm if you have a family history of AAA or have a history of smoking.    Shots:     Get a flu shot each year.     Get a tetanus shot every 10 years.     Talk to your doctor about your pneumonia vaccines. There are now two you should receive - Pneumovax (PPSV 23) and Prevnar (PCV 13).     Talk to your pharmacist about a shingles vaccine.     Talk to your doctor about the hepatitis B vaccine.  Nutrition:     Eat at least 5 servings of fruits and vegetables each day.     Eat whole-grain bread, whole-wheat pasta and brown rice instead of white grains and rice.     Get adequate Calcium and Vitamin D.   Lifestyle    Exercise for at least 150 minutes a week (30 minutes a day, 5 days a week). This will help you control your weight and prevent disease.     Limit alcohol to one drink per day.     No smoking.     Wear sunscreen to prevent skin cancer.    See your dentist every six months for an exam and cleaning.    See your eye doctor every 1 to 2 years to screen for conditions such as glaucoma, macular degeneration, cataracts, etc.    Personalized Prevention Plan  You are due for the preventive services outlined below.  Your care team is available to assist you  in scheduling these services.  If you have already completed any of these items, please share that information with your care team to update in your medical record.  Health Maintenance Due   Topic Date Due     Zoster (Shingles) Vaccine (1 of 2) 01/02/1999     Discuss Advance Care Planning  09/01/2016     Annual Wellness Visit  02/17/2017     Pneumococcal Vaccine (2 of 2 - PPSV23) 02/22/2018     Flu Vaccine (1) 09/01/2019     Diabetic Foot Exam  10/01/2019

## 2020-01-31 ENCOUNTER — OFFICE VISIT (OUTPATIENT)
Dept: URGENT CARE | Facility: URGENT CARE | Age: 70
End: 2020-01-31
Payer: COMMERCIAL

## 2020-01-31 ENCOUNTER — ANCILLARY PROCEDURE (OUTPATIENT)
Dept: GENERAL RADIOLOGY | Facility: CLINIC | Age: 70
End: 2020-01-31
Attending: PHYSICIAN ASSISTANT
Payer: COMMERCIAL

## 2020-01-31 VITALS
HEART RATE: 73 BPM | SYSTOLIC BLOOD PRESSURE: 151 MMHG | BODY MASS INDEX: 30.91 KG/M2 | WEIGHT: 221.6 LBS | OXYGEN SATURATION: 96 % | TEMPERATURE: 97.6 F | DIASTOLIC BLOOD PRESSURE: 82 MMHG
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
## 2020-01-31 DIAGNOSIS — S29.9XXA CHEST WALL INJURY, INITIAL ENCOUNTER: Primary | ICD-10-CM

## 2020-01-31 PROCEDURE — 71101 X-RAY EXAM UNILAT RIBS/CHEST: CPT | Mod: LT

## 2020-01-31 PROCEDURE — 99214 OFFICE O/P EST MOD 30 MIN: CPT | Performed by: PHYSICIAN ASSISTANT

## 2020-01-31 RX ORDER — HYDROCODONE BITARTRATE AND ACETAMINOPHEN 5; 325 MG/1; MG/1
1 TABLET ORAL EVERY 6 HOURS PRN
Qty: 15 TABLET | Refills: 0 | Status: SHIPPED | OUTPATIENT
Start: 2020-01-31 | End: 2020-02-03

## 2020-01-31 ASSESSMENT — ENCOUNTER SYMPTOMS
WHEEZING: 0
FATIGUE: 0
MYALGIAS: 1
ARTHRALGIAS: 1
COUGH: 0
CHILLS: 0
NECK STIFFNESS: 0
WOUND: 0
BACK PAIN: 0
CHEST TIGHTNESS: 0
FEVER: 0
JOINT SWELLING: 1
COLOR CHANGE: 0
PALPITATIONS: 0
SHORTNESS OF BREATH: 0
CONSTITUTIONAL NEGATIVE: 1
NECK PAIN: 0

## 2020-01-31 NOTE — PROGRESS NOTES
Subjective   Rick Sandhu is a 70 year old male who presents to clinic today for the following health issues:  HPI   Musculoskeletal problem/pain    Duration: 3days ago    Description  Location: L sided rib pain    Intensity:  moderate, severe    Accompanying signs and symptoms:  No radicular pain, numbness, tingling or weakness.  Mild swelling but no redness, drainage or fevers.  No hemoptysis, SOB, palpitations, or wheezing.  No abdominal pain, n/v, constipation, diarrhea, bloody or black tarry stools.  No fever, chills or sweats.    History  Previous similar problem: no   Previous evaluation:  none    Precipitating or alleviating factors:  Trauma or overuse: YES- sustained a rib injury after tripping and falling at home against his couch.  No head/neck injuries or LOC.  Aggravating factors include: worse with palpation, movement, coughing and deep breathing    Therapies tried and outcome: rest/inactivity, ice, immobilization with minimal relief    Patient Active Problem List   Diagnosis     Hyperlipidemia LDL goal <100     Rosacea     Sarcoidosis     Advanced directives, counseling/discussion     Posterior vitreous detachment, bilateral     Essential hypertension with goal blood pressure less than 140/90     Type 2 diabetes mellitus without complication, without long-term current use of insulin (H)     Combined forms of age-related cataract, mild-mod, of both eyes     Past Surgical History:   Procedure Laterality Date     C APPENDECTOMY       COLONOSCOPY WITH CO2 INSUFFLATION N/A 8/23/2016    Procedure: COLONOSCOPY WITH CO2 INSUFFLATION;  Surgeon: Duane, William Charles, MD;  Location:  OR     OTHER SURGICAL HISTORY Left      ROTATOR CUFF REPAIR RT/LT  9/06    Left     ROTATOR CUFF REPAIR RT/LT  2008    Right     ROTATOR CUFF REPAIR RT/LT  2010    Right - redo     VASECTOMY  1982       Social History     Tobacco Use     Smoking status: Former Smoker     Last attempt to quit: 1/6/1986     Years since  quittin.0     Smokeless tobacco: Never Used   Substance Use Topics     Alcohol use: Yes     Alcohol/week: 0.0 standard drinks     Comment: 1-2 drinks every other week     Family History   Problem Relation Age of Onset     Prostate Cancer Brother      Cancer Father         Thyroid     Hypertension Father      Diabetes Brother      Hypertension Mother      Breast Cancer Paternal Aunt          Current Outpatient Medications   Medication Sig Dispense Refill     ASPIRIN 81 MG PO TABS 1 TABLET DAILY (*)       atorvastatin (LIPITOR) 10 MG tablet Take 1 tablet (10 mg) by mouth daily 90 tablet 1     glipiZIDE (GLUCOTROL XL) 5 MG 24 hr tablet Take 1 tablet (5 mg) by mouth daily 90 tablet 1     lisinopril-hydrochlorothiazide (PRINZIDE/ZESTORETIC) 20-25 MG tablet Take 1 tablet by mouth daily 90 tablet 1     melatonin 3 MG tablet Take 3 mg by mouth nightly as needed Reported on 2017       metFORMIN (GLUCOPHAGE) 500 MG tablet TAKE 1 TABLET BY MOUTH TWICE A DAY WITH MEALS 180 tablet 1     Allergies   Allergen Reactions     Penicillin G      Reviewed and updated as needed this visit by Provider       Review of Systems   Constitutional: Negative.  Negative for chills, fatigue and fever.   Respiratory: Negative for cough, chest tightness, shortness of breath and wheezing.    Cardiovascular: Negative for chest pain, palpitations and peripheral edema.   Musculoskeletal: Positive for arthralgias, joint swelling and myalgias. Negative for back pain, gait problem, neck pain and neck stiffness.   Skin: Negative for color change, pallor, rash and wound.   All other systems reviewed and are negative.           Objective    BP (!) 151/82 (BP Location: Left arm, Patient Position: Chair, Cuff Size: Adult Regular)   Pulse 73   Temp 97.6  F (36.4  C) (Oral)   Wt 100.5 kg (221 lb 9.6 oz)   SpO2 96%   BMI 30.91 kg/m    Body mass index is 30.91 kg/m .  Physical Exam  Vitals signs and nursing note reviewed.   Constitutional:        General: He is not in acute distress.     Appearance: Normal appearance. He is normal weight. He is not ill-appearing.   HENT:      Head: Normocephalic and atraumatic.      Ears:      Comments: TMs are intact without any erythema or bulging bilaterally.  Airway is patent.     Nose: Nose normal.      Mouth/Throat:      Lips: Pink.      Mouth: Mucous membranes are moist.      Pharynx: Oropharynx is clear. Uvula midline. No pharyngeal swelling, oropharyngeal exudate or posterior oropharyngeal erythema.   Eyes:      General: No scleral icterus.     Conjunctiva/sclera: Conjunctivae normal.      Pupils: Pupils are equal, round, and reactive to light.   Neck:      Musculoskeletal: Normal range of motion and neck supple.      Thyroid: No thyromegaly.   Cardiovascular:      Rate and Rhythm: Normal rate and regular rhythm.      Pulses: Normal pulses.      Heart sounds: Normal heart sounds, S1 normal and S2 normal. No murmur. No friction rub. No gallop.    Pulmonary:      Effort: Pulmonary effort is normal. No accessory muscle usage, respiratory distress or retractions.      Breath sounds: Normal breath sounds and air entry. No decreased breath sounds, wheezing, rhonchi or rales.   Chest:      Chest wall: Swelling and tenderness (over the L midaxillary rib#7.  no erythema or stepoffs present.,) present. No mass, lacerations, deformity, crepitus or edema. There is no dullness to percussion.   Lymphadenopathy:      Cervical: No cervical adenopathy.   Skin:     General: Skin is warm and dry.      Nails: There is no clubbing.     Neurological:      Mental Status: He is alert and oriented to person, place, and time.   Psychiatric:         Mood and Affect: Mood normal.         Behavior: Behavior normal.         Thought Content: Thought content normal.         Judgment: Judgment normal.     Diagnostic Test Results:  Labs reviewed in Epic  Results for orders placed or performed in visit on 01/31/20 (from the past 24 hour(s))   XR Ribs  & Chest Left G/E 3 Views    Narrative    LEFT RIBS AND CHEST, THREE VIEWS  1/31/2020 1:30 PM     HISTORY: Chest wall injury, initial encounter.    COMPARISON: None.      Impression    IMPRESSION: PA chest shows mild elevation of the right hemidiaphragm  of uncertain age and etiology. Minimal platelike atelectasis or linear  scarring in the left lung base. The lungs are otherwise clear, and the  heart size is normal. Left rib detail views show no abnormality.             Assessment & Plan   Chest wall injury, initial encounter:  Xrays are negative for acute fractures or dislocations, no pneumothorax. Most likely strain/sprain/contusion.  Recommend RICE and will give norco#15 which he can take with ibuprofen prn pain.  Discussed risks and benefits of medication along with side effects, direction for use.  No driving or operating machinery due to sedation. Recheck in clinic if symptoms worsen or if symptoms do not improve.  To the ER if worsening pain, hemoptysis, shortness of breath or wheezing.  -     XR Ribs & Chest Left G/E 3 Views  -     HYDROcodone-acetaminophen (NORCO) 5-325 MG tablet; Take 1 tablet by mouth every 6 hours as needed for pain    Fall, initial encounter           Marilin See ITZEL Arguelles  [facility]

## 2020-02-24 ENCOUNTER — HEALTH MAINTENANCE LETTER (OUTPATIENT)
Age: 70
End: 2020-02-24

## 2020-02-28 DIAGNOSIS — I10 ESSENTIAL HYPERTENSION WITH GOAL BLOOD PRESSURE LESS THAN 140/90: ICD-10-CM

## 2020-02-28 DIAGNOSIS — E11.9 TYPE 2 DIABETES MELLITUS WITHOUT COMPLICATION, WITHOUT LONG-TERM CURRENT USE OF INSULIN (H): ICD-10-CM

## 2020-02-28 NOTE — TELEPHONE ENCOUNTER
Requested Prescriptions   Pending Prescriptions Disp Refills     metFORMIN (GLUCOPHAGE) 500 MG tablet [Pharmacy Med Name: METFORMIN  MG TABLET]  Last Written Prescription Date:  9/3/19  Last Fill Quantity: 180 tablet,  # refills: 1   Last office visit: 9/3/2019 with prescribing provider:  Dr. Muñoz   Future Office Visit:   Next 5 appointments (look out 90 days)    Mar 03, 2020  9:20 AM CST  [facility] [address] [address] [phone]          180 tablet 1     Sig: TAKE 1 TABLET BY MOUTH TWICE A DAY WITH MEALS       Biguanide Agents Failed - 2/28/2020  9:14 AM        Failed - Blood pressure less than 140/90 in past 6 months     BP Readings from Last 3 Encounters:   01/31/20 (!) 151/82   09/03/19 124/68   03/11/19 128/74                 Failed - Patient has documented A1c within the specified period of time.     If HgbA1C is 8 or greater, it needs to be on file within the past 3 months.  If less than 8, must be on file within the past 6 months.     Recent Labs   Lab Test 08/27/19  0707   A1C 7.8*             Passed - Patient has documented LDL within the past 12 mos.     Recent Labs   Lab Test 08/27/19  0707   LDL 63             Passed - Patient has had a Microalbumin in the past 15 mos.     Recent Labs   Lab Test 08/27/19  0715   MICROL 11   UMALCR 7.11             Passed - Patient is age 10 or older        Passed - Patient's CR is NOT>1.4 OR Patient's EGFR is NOT<45 within past 12 mos.     Recent Labs   Lab Test 08/27/19  0707   GFRESTIMATED 88   GFRESTBLACK >90       Recent Labs   Lab Test 08/27/19  0707   CR 0.85             Passed - Patient does NOT have a diagnosis of CHF.        Passed - Medication is active on med list        Passed - Recent (6 mo) or future (30 days) visit within the authorizing provider's specialty     Patient had office visit in the last 6 months or has a  "visit in the next 30 days with authorizing provider or within the authorizing provider's specialty.  See \"Patient Info\" tab in inbasket, or \"Choose Columns\" in Meds & Orders section of the refill encounter.                  glipiZIDE (GLUCOTROL XL) 5 MG 24 hr tablet [Pharmacy Med Name: GLIPIZIDE ER 5 MG TABLET]  Last Written Prescription Date:  9/3/19  Last Fill Quantity: 90 tablet,  # refills: 1   Last office visit: 9/3/2019 with prescribing provider:  Dr. Muñoz   Future Office Visit:   Next 5 appointments (look out 90 days)    Mar 03, 2020  9:20 AM CST  Office Visit with Keila Muñoz MD  [facility] [address] [place] [id number] [phone]          90 tablet 1     Sig: TAKE 1 TABLET BY MOUTH EVERY DAY       Sulfonylurea Agents Failed - 2/28/2020  9:14 AM        Failed - Blood pressure less than 140/90 in past 6 months     BP Readings from Last 3 Encounters:   01/31/20 (!) 151/82   09/03/19 124/68   03/11/19 128/74                 Failed - Patient has documented A1c within the specified period of time.     If HgbA1C is 8 or greater, it needs to be on file within the past 3 months.  If less than 8, must be on file within the past 6 months.     Recent Labs   Lab Test 08/27/19  0707   A1C 7.8*             Passed - Patient has documented LDL within the past 12 mos.     Recent Labs   Lab Test 08/27/19  0707   LDL 63             Passed - Patient has had a Microalbumin in the past 15 mos.     Recent Labs   Lab Test 08/27/19  0715   MICROL 11   UMALCR 7.11             Passed - Medication is active on med list        Passed - Patient is age 18 or older        Passed - Patient has a recent creatinine (normal) within the past 12 mos.     Recent Labs   Lab Test 08/27/19  0707   CR 0.85             Passed - Recent (6 mo) or future (30 days) visit within the authorizing provider's specialty     Patient had office visit in the last 6 months or has a " "visit in the next 30 days with authorizing provider or within the authorizing provider's specialty.  See \"Patient Info\" tab in inbasket, or \"Choose Columns\" in Meds & Orders section of the refill encounter.                  lisinopril-hydrochlorothiazide (ZESTORETIC) 20-25 MG tablet [Pharmacy Med Name: LISINOPRIL-HCTZ 20-25 MG TAB]  Last Written Prescription Date:  9/3/19  Last Fill Quantity: 90 tablet,  # refills: 01   Last office visit: 9/3/2019 with prescribing provider:  Dr. Muñoz   Future Office Visit:   Next 5 appointments (look out 90 days)    Mar 03, 2020  9:20 AM CST  Office Visit with Keila Muñoz MD  [facility] [address] [place] [address]  [phone]          90 tablet 1     Sig: TAKE 1 TABLET BY MOUTH EVERY DAY       Diuretics (Including Combos) Protocol Failed - 2/28/2020  9:14 AM        Failed - Blood pressure under 140/90 in past 12 months     BP Readings from Last 3 Encounters:   01/31/20 (!) 151/82   09/03/19 124/68   03/11/19 128/74                 Passed - Recent (12 mo) or future (30 days) visit within the authorizing provider's specialty     Patient has had an office visit with the authorizing provider or a provider within the authorizing providers department within the previous 12 mos or has a future within next 30 days. See \"Patient Info\" tab in inbasket, or \"Choose Columns\" in Meds & Orders section of the refill encounter.              Passed - Medication is active on med list        Passed - Patient is age 18 or older        Passed - Normal serum creatinine on file in past 12 months     Recent Labs   Lab Test 08/27/19  0707   CR 0.85              Passed - Normal serum potassium on file in past 12 months     Recent Labs   Lab Test 08/27/19  0707   POTASSIUM 3.9                    Passed - Normal serum sodium on file in past 12 months     Recent Labs   Lab Test 08/27/19  0707                   "

## 2020-03-03 ENCOUNTER — OFFICE VISIT (OUTPATIENT)
Dept: FAMILY MEDICINE | Facility: CLINIC | Age: 70
End: 2020-03-03
Payer: COMMERCIAL

## 2020-03-03 VITALS
WEIGHT: 222 LBS | HEIGHT: 71 IN | TEMPERATURE: 98.5 F | SYSTOLIC BLOOD PRESSURE: 131 MMHG | OXYGEN SATURATION: 96 % | HEART RATE: 62 BPM | BODY MASS INDEX: 31.08 KG/M2 | RESPIRATION RATE: 18 BRPM | DIASTOLIC BLOOD PRESSURE: 79 MMHG

## 2020-03-03 DIAGNOSIS — E78.5 HYPERLIPIDEMIA LDL GOAL <70: ICD-10-CM

## 2020-03-03 DIAGNOSIS — E11.9 TYPE 2 DIABETES MELLITUS WITHOUT COMPLICATION, WITHOUT LONG-TERM CURRENT USE OF INSULIN (H): ICD-10-CM

## 2020-03-03 DIAGNOSIS — I10 ESSENTIAL HYPERTENSION WITH GOAL BLOOD PRESSURE LESS THAN 140/90: ICD-10-CM

## 2020-03-03 LAB — HBA1C MFR BLD: 8.7 % (ref 0–5.6)

## 2020-03-03 PROCEDURE — 83036 HEMOGLOBIN GLYCOSYLATED A1C: CPT | Performed by: FAMILY MEDICINE

## 2020-03-03 PROCEDURE — 99214 OFFICE O/P EST MOD 30 MIN: CPT | Performed by: FAMILY MEDICINE

## 2020-03-03 PROCEDURE — 36415 COLL VENOUS BLD VENIPUNCTURE: CPT | Performed by: FAMILY MEDICINE

## 2020-03-03 RX ORDER — LISINOPRIL AND HYDROCHLOROTHIAZIDE 20; 25 MG/1; MG/1
TABLET ORAL
Qty: 90 TABLET | Refills: 1 | OUTPATIENT
Start: 2020-03-03

## 2020-03-03 RX ORDER — GLIPIZIDE 5 MG/1
5 TABLET, FILM COATED, EXTENDED RELEASE ORAL DAILY
Qty: 90 TABLET | Refills: 1 | Status: SHIPPED | OUTPATIENT
Start: 2020-03-03 | End: 2020-09-01

## 2020-03-03 RX ORDER — ATORVASTATIN CALCIUM 10 MG/1
10 TABLET, FILM COATED ORAL DAILY
Qty: 90 TABLET | Refills: 1 | Status: SHIPPED | OUTPATIENT
Start: 2020-03-03 | End: 2020-09-01

## 2020-03-03 RX ORDER — GLIPIZIDE 5 MG/1
TABLET, FILM COATED, EXTENDED RELEASE ORAL
Qty: 90 TABLET | Refills: 1 | OUTPATIENT
Start: 2020-03-03

## 2020-03-03 RX ORDER — LISINOPRIL AND HYDROCHLOROTHIAZIDE 20; 25 MG/1; MG/1
1 TABLET ORAL DAILY
Qty: 90 TABLET | Refills: 1 | Status: SHIPPED | OUTPATIENT
Start: 2020-03-03 | End: 2020-08-25

## 2020-03-03 ASSESSMENT — MIFFLIN-ST. JEOR: SCORE: 1784.12

## 2020-03-03 NOTE — PROGRESS NOTES
"Subjective     Rick Sandhu is a 70 year old male who presents to clinic today for the following health issues:    HPI   Diabetes Follow-up      How often are you checking your blood sugar? Only in clinic     What concerns do you have today about your diabetes? None     Do you have any of these symptoms? (Select all that apply)  No numbness or tingling in feet.  No redness, sores or blisters on feet.  No complaints of excessive thirst.  No reports of blurry vision.  No significant changes to weight.      BP Readings from Last 2 Encounters:   01/31/20 (!) 151/82   09/03/19 124/68     Hemoglobin A1C (%)   Date Value   08/27/2019 7.8 (H)   03/11/2019 7.7 (H)     LDL Cholesterol Calculated (mg/dL)   Date Value   08/27/2019 63   10/01/2018 65                 How many servings of fruits and vegetables do you eat daily?  0-1    On average, how many sweetened beverages do you drink each day (Examples: soda, juice, sweet tea, etc.  Do NOT count diet or artificially sweetened beverages)?   0    How many days per week do you exercise enough to make your heart beat faster? 2-3    How many minutes a day do you exercise enough to make your heart beat faster? 30 - 60    How many days per week do you miss taking your medication? 0    SUBJECTIVE:  Here today in follow-up of diabetes, hypertension, lipids  Doing well.  Reports no interval health concerns.    Patient reports no side effects from medications, and desires no change in therapy.     Review of systems otherwise negative.  Past medical, family, and social history reviewed and updated in chart.    OBJECTIVE:  /79 (BP Location: Right arm, Patient Position: Sitting, Cuff Size: Adult Large)   Pulse 62   Temp 98.5  F (36.9  C) (Oral)   Resp 18   Ht 1.803 m (5' 11\")   Wt 100.7 kg (222 lb)   SpO2 96%   BMI 30.96 kg/m    Alert, pleasant, upbeat, and in no apparent discomfort.  S1 and S2 normal, no murmurs, clicks, gallops or rubs. Regular rate and rhythm. Chest is " clear; no wheezes or rales. No edema or JVD.  Past labs reviewed with the patient.     ASSESSMENT / PLAN:  (E11.9) Type 2 diabetes mellitus without complication, without long-term current use of insulin (H)  Comment: A1c between 7.5 and 8 steadily over the years.  Recheck and adjust as needed  Plan: HEMOGLOBIN A1C, metFORMIN (GLUCOPHAGE) 500 MG         tablet, glipiZIDE (GLUCOTROL XL) 5 MG 24 hr         tablet            (I10) Essential hypertension with goal blood pressure less than 140/90  Comment: Well-controlled on current regimen.  Continue same  Plan: lisinopril-hydrochlorothiazide (ZESTORETIC)         20-25 MG tablet            (E78.5) Hyperlipidemia LDL goal <70  Comment: Due for recheck in 6 months  Plan: atorvastatin (LIPITOR) 10 MG tablet            Follow up 6 months  MARIELLE Muñoz MD    (Chart documentation completed in part with Dragon voice-recognition software.  Even though reviewed some grammatical, spelling, and word errors may remain.)

## 2020-03-03 NOTE — TELEPHONE ENCOUNTER
Routing refill request to provider for review/approval because:  Sent to provider as patient is scheduled to be seen in clinic on 3/3/2020. Failed protocol but sent incase of possible dose change.    Linda Arreola RN, [facility] Triage

## 2020-03-03 NOTE — RESULT ENCOUNTER NOTE
Epi,  Whoa, that A1c has jumped up significantly.  I know we have not had a change in the medication, so this perhaps is more lifestyle driven?  We should bring this down either through change in lifestyle or adjustment in medication or both.  And we can plan to recheck in 2 to 3 months.  MARIELLE Muñoz M.D.

## 2020-03-03 NOTE — PATIENT INSTRUCTIONS
At [facility], we strive to deliver an exceptional experience to you, every time we see you. If you receive a survey, please complete it as we do value your feedback.  If you have MyChart, you can expect to receive results automatically within 24 hours of their completion.  Your provider will send a note interpreting your results as well.   If you do not have MyChart, you should receive your results in about a week by mail.    Your care team:     Family Medicine   ITZEL Starr MD Emily Bunt, GINO JO   S. MD Eugenie Najera MD Angela Wermerskirchen, MD    Internal Medicine  Abel Meredith MD coming 2020     Clinic hours: Monday - Wednesday 7 am-7 pm   Thursdays and Fridays 7 am-5 pm.     [facility]: Monday - Friday 11 am-9 pm,   Saturday and Sunday 9 am-5 pm.    [facility]: Monday -Thursday 8 am-8 pm; Friday 8 am-6 pm; Saturday and Sunday 9 am-5 pm.     [facility]: Monday - Thursday 8 am - 7 pm; Friday 8 am - 6 pm    Clinic: [phone]   [facility]: [phone] m [facility]: [phone]

## 2020-08-24 DIAGNOSIS — I10 ESSENTIAL HYPERTENSION WITH GOAL BLOOD PRESSURE LESS THAN 140/90: ICD-10-CM

## 2020-08-24 DIAGNOSIS — E11.9 TYPE 2 DIABETES MELLITUS WITHOUT COMPLICATION, WITHOUT LONG-TERM CURRENT USE OF INSULIN (H): ICD-10-CM

## 2020-08-25 RX ORDER — LISINOPRIL AND HYDROCHLOROTHIAZIDE 20; 25 MG/1; MG/1
TABLET ORAL
Qty: 90 TABLET | Refills: 0 | Status: SHIPPED | OUTPATIENT
Start: 2020-08-25 | End: 2020-09-28

## 2020-08-25 NOTE — TELEPHONE ENCOUNTER
Please schedule patient. Rody refill given.   Needs appointment and labs for further refills.     Daylin Alaniz RN  [facility]

## 2020-08-28 DIAGNOSIS — E78.5 HYPERLIPIDEMIA LDL GOAL <70: ICD-10-CM

## 2020-08-28 DIAGNOSIS — E11.9 TYPE 2 DIABETES MELLITUS WITHOUT COMPLICATION, WITHOUT LONG-TERM CURRENT USE OF INSULIN (H): ICD-10-CM

## 2020-09-01 RX ORDER — GLIPIZIDE 5 MG/1
5 TABLET, FILM COATED, EXTENDED RELEASE ORAL DAILY
Qty: 30 TABLET | Refills: 0 | Status: SHIPPED | OUTPATIENT
Start: 2020-09-01 | End: 2020-09-28

## 2020-09-01 RX ORDER — ATORVASTATIN CALCIUM 10 MG/1
10 TABLET, FILM COATED ORAL DAILY
Qty: 30 TABLET | Refills: 0 | Status: SHIPPED | OUTPATIENT
Start: 2020-09-01 | End: 2020-09-28

## 2020-09-01 NOTE — TELEPHONE ENCOUNTER
Medication is being filled for 1 time refill only due to:  Patient needs to be seen because due for chronic disease check up and fasting labs.   Has scheduled lab-only apt on 9/21/20 and visit on 9/28/20 with PCP.  30 day colleen refills given. Additional refills to be addressed at time of visit.    Yanni Smith RN  [facility]

## 2020-09-20 ENCOUNTER — DOCUMENTATION ONLY (OUTPATIENT)
Dept: FAMILY MEDICINE | Facility: CLINIC | Age: 70
End: 2020-09-20

## 2020-09-20 DIAGNOSIS — E11.9 TYPE 2 DIABETES MELLITUS WITHOUT COMPLICATION, WITHOUT LONG-TERM CURRENT USE OF INSULIN (H): Primary | ICD-10-CM

## 2020-09-21 DIAGNOSIS — E11.9 TYPE 2 DIABETES MELLITUS WITHOUT COMPLICATION, WITHOUT LONG-TERM CURRENT USE OF INSULIN (H): ICD-10-CM

## 2020-09-21 LAB
ALBUMIN SERPL-MCNC: 4.2 G/DL (ref 3.4–5)
ALP SERPL-CCNC: 49 U/L (ref 40–150)
ALT SERPL W P-5'-P-CCNC: 32 U/L (ref 0–70)
ANION GAP SERPL CALCULATED.3IONS-SCNC: 6 MMOL/L (ref 3–14)
AST SERPL W P-5'-P-CCNC: 23 U/L (ref 0–45)
BILIRUB SERPL-MCNC: 1.2 MG/DL (ref 0.2–1.3)
BUN SERPL-MCNC: 15 MG/DL (ref 7–30)
CALCIUM SERPL-MCNC: 9.1 MG/DL (ref 8.5–10.1)
CHLORIDE SERPL-SCNC: 102 MMOL/L (ref 94–109)
CHOLEST SERPL-MCNC: 140 MG/DL
CO2 SERPL-SCNC: 30 MMOL/L (ref 20–32)
CREAT SERPL-MCNC: 0.8 MG/DL (ref 0.66–1.25)
CREAT UR-MCNC: 133 MG/DL
GFR SERPL CREATININE-BSD FRML MDRD: 90 ML/MIN/{1.73_M2}
GLUCOSE SERPL-MCNC: 117 MG/DL (ref 70–99)
HBA1C MFR BLD: 5.8 % (ref 0–5.6)
HDLC SERPL-MCNC: 51 MG/DL
LDLC SERPL CALC-MCNC: 50 MG/DL
MICROALBUMIN UR-MCNC: 12 MG/L
MICROALBUMIN/CREAT UR: 8.87 MG/G CR (ref 0–17)
NONHDLC SERPL-MCNC: 89 MG/DL
POTASSIUM SERPL-SCNC: 4 MMOL/L (ref 3.4–5.3)
PROT SERPL-MCNC: 7.2 G/DL (ref 6.8–8.8)
SODIUM SERPL-SCNC: 138 MMOL/L (ref 133–144)
TRIGL SERPL-MCNC: 193 MG/DL

## 2020-09-21 PROCEDURE — 36415 COLL VENOUS BLD VENIPUNCTURE: CPT | Performed by: FAMILY MEDICINE

## 2020-09-21 PROCEDURE — 82043 UR ALBUMIN QUANTITATIVE: CPT | Performed by: FAMILY MEDICINE

## 2020-09-21 PROCEDURE — 80053 COMPREHEN METABOLIC PANEL: CPT | Performed by: FAMILY MEDICINE

## 2020-09-21 PROCEDURE — 80061 LIPID PANEL: CPT | Performed by: FAMILY MEDICINE

## 2020-09-21 PROCEDURE — 83036 HEMOGLOBIN GLYCOSYLATED A1C: CPT | Performed by: FAMILY MEDICINE

## 2020-09-25 DIAGNOSIS — E11.9 TYPE 2 DIABETES MELLITUS WITHOUT COMPLICATION, WITHOUT LONG-TERM CURRENT USE OF INSULIN (H): ICD-10-CM

## 2020-09-28 ENCOUNTER — OFFICE VISIT (OUTPATIENT)
Dept: FAMILY MEDICINE | Facility: CLINIC | Age: 70
End: 2020-09-28
Payer: COMMERCIAL

## 2020-09-28 VITALS
SYSTOLIC BLOOD PRESSURE: 136 MMHG | OXYGEN SATURATION: 97 % | BODY MASS INDEX: 27.86 KG/M2 | DIASTOLIC BLOOD PRESSURE: 80 MMHG | TEMPERATURE: 96.7 F | HEIGHT: 71 IN | HEART RATE: 52 BPM | WEIGHT: 199 LBS

## 2020-09-28 DIAGNOSIS — E78.5 HYPERLIPIDEMIA LDL GOAL <70: ICD-10-CM

## 2020-09-28 DIAGNOSIS — E11.9 TYPE 2 DIABETES MELLITUS WITHOUT COMPLICATION, WITHOUT LONG-TERM CURRENT USE OF INSULIN (H): Primary | ICD-10-CM

## 2020-09-28 DIAGNOSIS — I10 ESSENTIAL HYPERTENSION WITH GOAL BLOOD PRESSURE LESS THAN 140/90: ICD-10-CM

## 2020-09-28 PROCEDURE — 90662 IIV NO PRSV INCREASED AG IM: CPT | Performed by: FAMILY MEDICINE

## 2020-09-28 PROCEDURE — 99214 OFFICE O/P EST MOD 30 MIN: CPT | Mod: 25 | Performed by: FAMILY MEDICINE

## 2020-09-28 PROCEDURE — G0008 ADMIN INFLUENZA VIRUS VAC: HCPCS | Performed by: FAMILY MEDICINE

## 2020-09-28 RX ORDER — ATORVASTATIN CALCIUM 10 MG/1
10 TABLET, FILM COATED ORAL DAILY
Qty: 90 TABLET | Refills: 1 | Status: SHIPPED | OUTPATIENT
Start: 2020-09-28 | End: 2021-03-23

## 2020-09-28 RX ORDER — LISINOPRIL AND HYDROCHLOROTHIAZIDE 20; 25 MG/1; MG/1
1 TABLET ORAL DAILY
Qty: 90 TABLET | Refills: 1 | Status: SHIPPED | OUTPATIENT
Start: 2020-09-28 | End: 2021-05-20

## 2020-09-28 RX ORDER — GLIPIZIDE 5 MG/1
5 TABLET, FILM COATED, EXTENDED RELEASE ORAL DAILY
Qty: 90 TABLET | Refills: 1 | Status: SHIPPED | OUTPATIENT
Start: 2020-09-28 | End: 2021-03-23

## 2020-09-28 RX ORDER — GLIPIZIDE 5 MG/1
5 TABLET, FILM COATED, EXTENDED RELEASE ORAL DAILY
Qty: 30 TABLET | Refills: 0 | OUTPATIENT
Start: 2020-09-28

## 2020-09-28 ASSESSMENT — MIFFLIN-ST. JEOR: SCORE: 1679.79

## 2020-09-28 ASSESSMENT — PAIN SCALES - GENERAL: PAINLEVEL: NO PAIN (0)

## 2020-09-28 NOTE — TELEPHONE ENCOUNTER
glipiZIDE (GLUCOTROL XL) 5 MG 24 hr tablet  90 tablet  1  9/28/2020   No    Sig - Route: Take 1 tablet (5 mg) by mouth daily - Oral    Sent to pharmacy as: glipiZIDE ER 5 MG Oral Tablet Extended Release 24 Hour (GLUCOTROL XL)    Class: E-Prescribe    Order: [id number]     Duplicate request    Felicitas Carter RN  [facility]

## 2020-09-28 NOTE — PATIENT INSTRUCTIONS
At [facility], we strive to deliver an exceptional experience to you, every time we see you. If you receive a survey, please complete it as we do value your feedback.  If you have MyChart, you can expect to receive results automatically within 24 hours of their completion.  Your provider will send a note interpreting your results as well.   If you do not have MyChart, you should receive your results in about a week by mail.    Your care team:                            Family Medicine Internal Medicine   MD Bakari Bennett, MD Jeanette Dos Santos, MD Nga Guevara PA-C, APRN CNP    Hi Valenzuela, MD Pediatrics   Nick Burch, PAAleksandraC  Laila Montgomery, CNP Rachana Jiménez, MD Rae Bernardo APRN CNP   MD Alissa Rivers MD Deborah Mielke, MD Celina Izaguirre, APRN CNP  Theresa Grullon, PACHONG Mcbride, CNP  MD Eugenie Najera MD Angela Wermerskirchen, MD      Clinic hours: Monday - Thursday 7 am-7 pm; Fridays 7 am-5 pm.   Urgent care: Monday - Friday 11 am-9 pm; Saturday and Sunday 9 am-5 pm.    Clinic: [phone]       [facility]: Monday - Thursday 8 am - 7 pm; Friday 8 am - 6 pm  [facility]: [phone]     Use www.[email or web address].org for 24/7 diagnosis and treatment of dozens of conditions.    Patient Education   Personalized Prevention Plan  You are due for the preventive services outlined below.  Your care team is available to assist you in scheduling these services.  If you have already completed any of these items, please share that information with your care team to update in your medical record.  Health Maintenance Due   Topic Date Due     Hepatitis B Vaccine (1 of 3 - Risk 3-dose series) 01/02/1968     Zoster (Shingles) Vaccine (1 of 2) 01/02/1999     Discuss Advance Care Planning  09/01/2016     Pneumococcal Vaccine (2 of 2 - PPSV23)  02/22/2018     Diabetic Foot Exam  10/01/2019     Eye Exam  04/15/2020     Flu Vaccine (1) 09/01/2020     FALL RISK ASSESSMENT  09/03/2020     Annual Wellness Visit  09/03/2020

## 2020-09-28 NOTE — PROGRESS NOTES
"SUBJECTIVE:  Here today for follow-up of diabetes, hypertension, lipids.  When I last saw him 6 months ago A1c was shockingly high at 8.7.  This is a point higher than he had been consistently.  So he dedicated himself to changing diet and activity level and is down over 20 pounds.  We reviewed recent A1c of 5.6.  Doing well.  Reports no interval health concerns.  Patient reports no side effects from medications, and desires no change in therapy.     Review of systems otherwise negative.  Past medical, family, and social history reviewed and updated in chart.    OBJECTIVE:  /80   Pulse 52   Temp 96.7  F (35.9  C) (Tympanic)   Ht 1.803 m (5' 11\")   Wt 90.3 kg (199 lb)   SpO2 97%   BMI 27.75 kg/m    Alert, pleasant, upbeat, and in no apparent discomfort.  S1 and S2 normal, no murmurs, clicks, gallops or rubs. Regular rate and rhythm. Chest is clear; no wheezes or rales. No edema or JVD.  Past labs reviewed with the patient.     ASSESSMENT / PLAN:  (E11.9) Type 2 diabetes mellitus without complication, without long-term current use of insulin (H)  (primary encounter diagnosis)  Comment: Doing fantastically well with lifestyle changes and current regimen.  Watch for low blood sugars and we may need to get rid of glipizide at some point but we will keep the same regimen for now  Plan: glipiZIDE (GLUCOTROL XL) 5 MG 24 hr tablet,         metFORMIN (GLUCOPHAGE) 500 MG tablet            (I10) Essential hypertension with goal blood pressure less than 140/90  Comment: Doing well on current regimen.  Continue same  Plan: lisinopril-hydrochlorothiazide (ZESTORETIC)         20-25 MG tablet            (E78.5) Hyperlipidemia LDL goal <70  Comment: Doing well on current regimen.  Continue same  Plan: atorvastatin (LIPITOR) 10 MG tablet            Follow up 6 months  MARIELLE Muñoz MD    (Chart documentation completed in part with Dragon voice-recognition software.  Even though reviewed some grammatical, spelling, " and word errors may remain.)

## 2021-02-23 ENCOUNTER — OFFICE VISIT (OUTPATIENT)
Dept: OPHTHALMOLOGY | Facility: CLINIC | Age: 71
End: 2021-02-23
Payer: COMMERCIAL

## 2021-02-23 DIAGNOSIS — H43.813 POSTERIOR VITREOUS DETACHMENT, BILATERAL: ICD-10-CM

## 2021-02-23 DIAGNOSIS — L71.9 ROSACEA: ICD-10-CM

## 2021-02-23 DIAGNOSIS — E11.9 TYPE 2 DIABETES MELLITUS WITHOUT COMPLICATION, WITHOUT LONG-TERM CURRENT USE OF INSULIN (H): Primary | ICD-10-CM

## 2021-02-23 DIAGNOSIS — H25.813 COMBINED FORMS OF AGE-RELATED CATARACT OF BOTH EYES: ICD-10-CM

## 2021-02-23 DIAGNOSIS — H52.4 PRESBYOPIA: ICD-10-CM

## 2021-02-23 DIAGNOSIS — Z01.01 ENCOUNTER FOR EXAMINATION OF EYES AND VISION WITH ABNORMAL FINDINGS: ICD-10-CM

## 2021-02-23 PROCEDURE — 92014 COMPRE OPH EXAM EST PT 1/>: CPT | Performed by: OPHTHALMOLOGY

## 2021-02-23 PROCEDURE — 92015 DETERMINE REFRACTIVE STATE: CPT | Performed by: OPHTHALMOLOGY

## 2021-02-23 ASSESSMENT — REFRACTION_WEARINGRX
OD_SPHERE: -6.00
OD_CYLINDER: +1.50
OS_ADD: +2.75
OS_AXIS: 097
OS_CYLINDER: +1.50
SPECS_TYPE: PAL
OD_AXIS: 075
OS_SPHERE: -5.00
OD_ADD: +2.75

## 2021-02-23 ASSESSMENT — EXTERNAL EXAM - RIGHT EYE: OD_EXAM: 2+ BROW PTOSIS, ROSACEA

## 2021-02-23 ASSESSMENT — REFRACTION_MANIFEST
OD_ADD: +3.00
OS_CYLINDER: +1.25
OS_ADD: +3.00
OS_SPHERE: -5.25
OD_SPHERE: -6.25
OD_CYLINDER: +1.25
OD_AXIS: 067
OS_AXIS: 097

## 2021-02-23 ASSESSMENT — CONF VISUAL FIELD
OS_NORMAL: 1
OD_NORMAL: 1

## 2021-02-23 ASSESSMENT — VISUAL ACUITY
OS_CC: 20/40-2
OD_CC: 20/30-2
OS_CC: J2
CORRECTION_TYPE: GLASSES
OD_CC: J1
METHOD: SNELLEN - LINEAR

## 2021-02-23 ASSESSMENT — CUP TO DISC RATIO
OD_RATIO: 0.3
OS_RATIO: 0.3

## 2021-02-23 ASSESSMENT — TONOMETRY
OD_IOP_MMHG: 17
OS_IOP_MMHG: 17
IOP_METHOD: APPLANATION

## 2021-02-23 ASSESSMENT — EXTERNAL EXAM - LEFT EYE: OS_EXAM: 2+ BROW PTOSIS, ROSACEA

## 2021-02-23 NOTE — LETTER
2/23/2021         RE: Rick Sandhu  [address] [facility] [address]        Dear Colleague,    Thank you for referring your patient, Rick Sandhu, to the [facility]. Please see a copy of my visit note below.     Current Eye Medications:  no     Subjective:  DIABETIC EYE EXAM   Pt reports that it is a little harder to see.    Lab Results   Component Value Date    A1C 5.8 09/21/2020    A1C 8.7 03/03/2020    A1C 7.8 08/27/2019    A1C 7.7 03/11/2019    A1C 7.8 10/01/2018        Objective:  See Ophthalmology Exam.       Assessment:  Stable mild-moderate cataract both eyes.  No diabetic retinopathy.      ICD-10-CM    1. Type 2 diabetes mellitus without complication, without long-term current use of insulin (H)  E11.9    2. Combined forms of age-related cataract, mild-mod, of both eyes  H25.813    3. Rosacea  L71.9    4. Posterior vitreous detachment, bilateral  H43.813    5. Encounter for examination of eyes and vision with abnormal findings  Z01.01    6. Presbyopia  H52.4         Plan:  Glasses Rx given - optional  Use artificial tears up to 4 times daily both eyes as needed (Refresh Tears, Systane Ultra/Balance, or Theratears)   Call in October 2021 for an appointment in February 2022 for Complete Exam    Dr. Bedolla [phone]           Again, thank you for allowing me to participate in the care of your patient.        Sincerely,        Man Bedolla MD

## 2021-02-23 NOTE — PROGRESS NOTES
Current Eye Medications:  no     Subjective:  DIABETIC EYE EXAM   Pt reports that it is a little harder to see.    Lab Results   Component Value Date    A1C 5.8 09/21/2020    A1C 8.7 03/03/2020    A1C 7.8 08/27/2019    A1C 7.7 03/11/2019    A1C 7.8 10/01/2018        Objective:  See Ophthalmology Exam.       Assessment:  Stable mild-moderate cataract both eyes.  No diabetic retinopathy.      ICD-10-CM    1. Type 2 diabetes mellitus without complication, without long-term current use of insulin (H)  E11.9    2. Combined forms of age-related cataract, mild-mod, of both eyes  H25.813    3. Rosacea  L71.9    4. Posterior vitreous detachment, bilateral  H43.813    5. Encounter for examination of eyes and vision with abnormal findings  Z01.01    6. Presbyopia  H52.4         Plan:  Glasses Rx given - optional  Use artificial tears up to 4 times daily both eyes as needed (Refresh Tears, Systane Ultra/Balance, or Theratears)   Call in October 2021 for an appointment in February 2022 for Complete Exam    Dr. Bedolla [phone]

## 2021-02-23 NOTE — PATIENT INSTRUCTIONS
Glasses Rx given - optional  Use artificial tears up to 4 times daily both eyes as needed (Refresh Tears, Systane Ultra/Balance, or Theratears)   Call in October 2021 for an appointment in February 2022 for Complete Exam    Dr. Bedolla [phone]    Patient Education   Diabetes weakens the blood vessels all over the body, including the eyes. Damage to the blood vessels in the eyes can cause swelling or bleeding into part of the eye (called the retina). This is called diabetic retinopathy ([facility]-tin--[facility]-the). If not treated, this disease can cause vision loss or blindness.   Symptoms may include blurred or distorted vision, but many people have no symptoms. It's important to see your eye doctor regularly to check for problems.   Early treatment and good control can help protect your vision. Here are the things you can do to help prevent vision loss:      1. Keep your blood sugar levels under tight control.      2. Bring high blood pressure under control.      3. No smoking.      4. Have yearly dilated eye exams.

## 2021-03-05 ENCOUNTER — IMMUNIZATION (OUTPATIENT)
Dept: PEDIATRICS | Facility: CLINIC | Age: 71
End: 2021-03-05
Payer: COMMERCIAL

## 2021-03-05 PROCEDURE — 0001A PR COVID VAC PFIZER DIL RECON 30 MCG/0.3 ML IM: CPT

## 2021-03-05 PROCEDURE — 91300 PR COVID VAC PFIZER DIL RECON 30 MCG/0.3 ML IM: CPT

## 2021-03-21 DIAGNOSIS — E78.5 HYPERLIPIDEMIA LDL GOAL <70: ICD-10-CM

## 2021-03-21 DIAGNOSIS — E11.9 TYPE 2 DIABETES MELLITUS WITHOUT COMPLICATION, WITHOUT LONG-TERM CURRENT USE OF INSULIN (H): ICD-10-CM

## 2021-03-23 RX ORDER — GLIPIZIDE 5 MG/1
5 TABLET, FILM COATED, EXTENDED RELEASE ORAL DAILY
Qty: 90 TABLET | Refills: 0 | Status: SHIPPED | OUTPATIENT
Start: 2021-03-23 | End: 2021-06-15

## 2021-03-23 RX ORDER — ATORVASTATIN CALCIUM 10 MG/1
TABLET, FILM COATED ORAL
Qty: 90 TABLET | Refills: 0 | Status: SHIPPED | OUTPATIENT
Start: 2021-03-23 | End: 2021-06-15

## 2021-03-23 NOTE — TELEPHONE ENCOUNTER
Routing refill request to provider for review/approval because:  Labs not current:    Lab Results   Component Value Date    A1C 5.8 09/21/2020    A1C 8.7 03/03/2020    A1C 7.8 08/27/2019    A1C 7.7 03/11/2019    A1C 7.8 10/01/2018         Delphine Terrell RN

## 2021-03-26 ENCOUNTER — IMMUNIZATION (OUTPATIENT)
Dept: PEDIATRICS | Facility: CLINIC | Age: 71
End: 2021-03-26
Attending: INTERNAL MEDICINE
Payer: COMMERCIAL

## 2021-03-26 PROCEDURE — 0002A PR COVID VAC PFIZER DIL RECON 30 MCG/0.3 ML IM: CPT

## 2021-03-26 PROCEDURE — 91300 PR COVID VAC PFIZER DIL RECON 30 MCG/0.3 ML IM: CPT

## 2021-04-17 ENCOUNTER — HEALTH MAINTENANCE LETTER (OUTPATIENT)
Age: 71
End: 2021-04-17

## 2021-05-20 DIAGNOSIS — I10 ESSENTIAL HYPERTENSION WITH GOAL BLOOD PRESSURE LESS THAN 140/90: ICD-10-CM

## 2021-05-20 DIAGNOSIS — E11.9 TYPE 2 DIABETES MELLITUS WITHOUT COMPLICATION, WITHOUT LONG-TERM CURRENT USE OF INSULIN (H): ICD-10-CM

## 2021-05-20 RX ORDER — LISINOPRIL AND HYDROCHLOROTHIAZIDE 20; 25 MG/1; MG/1
TABLET ORAL
Qty: 90 TABLET | Refills: 0 | Status: SHIPPED | OUTPATIENT
Start: 2021-05-20 | End: 2021-06-22

## 2021-05-20 NOTE — TELEPHONE ENCOUNTER
"Requested Prescriptions   Pending Prescriptions Disp Refills    metFORMIN (GLUCOPHAGE) 500 MG tablet [Pharmacy Med Name: METFORMIN  MG TABLET] 180 tablet 1     Sig: TAKE 1 TABLET BY MOUTH TWICE A DAY WITH MEALS       Biguanide Agents Failed - 5/20/2021 12:18 AM        Failed - Patient has documented A1c within the specified period of time.     If HgbA1C is 8 or greater, it needs to be on file within the past 3 months.  If less than 8, must be on file within the past 6 months.     Recent Labs   Lab Test 09/21/20  0843   A1C 5.8*             Failed - Recent (6 mo) or future (30 days) visit within the authorizing provider's specialty     Patient had office visit in the last 6 months or has a visit in the next 30 days with authorizing provider or within the authorizing provider's specialty.  See \"Patient Info\" tab in inbasket, or \"Choose Columns\" in Meds & Orders section of the refill encounter.            Passed - Patient is age 10 or older        Passed - Patient's CR is NOT>1.4 OR Patient's EGFR is NOT<45 within past 12 mos.     Recent Labs   Lab Test 09/21/20  0843   GFRESTIMATED 90   GFRESTBLACK >90       Recent Labs   Lab Test 09/21/20  0843   CR 0.80             Passed - Patient does NOT have a diagnosis of CHF.        Passed - Medication is active on med list         Signed Prescriptions Disp Refills    lisinopril-hydrochlorothiazide (ZESTORETIC) 20-25 MG tablet 90 tablet 0     Sig: TAKE 1 TABLET BY MOUTH EVERY DAY       Diuretics (Including Combos) Protocol Passed - 5/20/2021 12:18 AM        Passed - Blood pressure under 140/90 in past 12 months     BP Readings from Last 3 Encounters:   09/28/20 136/80   03/03/20 131/79   01/31/20 (!) 151/82                 Passed - Recent (12 mo) or future (30 days) visit within the authorizing provider's specialty     Patient has had an office visit with the authorizing provider or a provider within the authorizing providers department within the previous 12 mos or has " "a future within next 30 days. See \"Patient Info\" tab in inbasket, or \"Choose Columns\" in Meds & Orders section of the refill encounter.              Passed - Medication is active on med list        Passed - Patient is age 18 or older        Passed - Normal serum creatinine on file in past 12 months     Recent Labs   Lab Test 09/21/20  0843   CR 0.80              Passed - Normal serum potassium on file in past 12 months     Recent Labs   Lab Test 09/21/20  0843   POTASSIUM 4.0                    Passed - Normal serum sodium on file in past 12 months     Recent Labs   Lab Test 09/21/20  0843                ACE Inhibitors (Including Combos) Protocol Passed - 5/20/2021 12:18 AM        Passed - Blood pressure under 140/90 in past 12 months     BP Readings from Last 3 Encounters:   09/28/20 136/80   03/03/20 131/79   01/31/20 (!) 151/82                 Passed - Recent (12 mo) or future (30 days) visit within the authorizing provider's specialty     Patient has had an office visit with the authorizing provider or a provider within the authorizing providers department within the previous 12 mos or has a future within next 30 days. See \"Patient Info\" tab in inbasket, or \"Choose Columns\" in Meds & Orders section of the refill encounter.              Passed - Medication is active on med list        Passed - Patient is age 18 or older        Passed - Normal serum creatinine on file in past 12 months     Recent Labs   Lab Test 09/21/20  0843   CR 0.80       Ok to refill medication if creatinine is low          Passed - Normal serum potassium on file in past 12 months     Recent Labs   Lab Test 09/21/20  0843   POTASSIUM 4.0                  "

## 2021-06-15 ENCOUNTER — TELEPHONE (OUTPATIENT)
Dept: FAMILY MEDICINE | Facility: CLINIC | Age: 71
End: 2021-06-15

## 2021-06-15 DIAGNOSIS — E11.9 TYPE 2 DIABETES MELLITUS WITHOUT COMPLICATION, WITHOUT LONG-TERM CURRENT USE OF INSULIN (H): ICD-10-CM

## 2021-06-15 DIAGNOSIS — E78.5 HYPERLIPIDEMIA LDL GOAL <70: ICD-10-CM

## 2021-06-15 RX ORDER — ATORVASTATIN CALCIUM 10 MG/1
TABLET, FILM COATED ORAL
Qty: 30 TABLET | Refills: 0 | Status: SHIPPED | OUTPATIENT
Start: 2021-06-15 | End: 2021-06-22

## 2021-06-15 RX ORDER — GLIPIZIDE 5 MG/1
5 TABLET, FILM COATED, EXTENDED RELEASE ORAL DAILY
Qty: 30 TABLET | Refills: 0 | Status: SHIPPED | OUTPATIENT
Start: 2021-06-15 | End: 2021-06-22

## 2021-06-15 NOTE — TELEPHONE ENCOUNTER
Called the patient and informed him 30 days of medication was sent until his appt. With his PCP.  Patient understood.  Opal Basilio

## 2021-06-15 NOTE — TELEPHONE ENCOUNTER
Has upcoming appointment next week with PCP - med refilled - please notify patient given 30 days of medication

## 2021-06-15 NOTE — TELEPHONE ENCOUNTER
Routing refill request to provider for review/approval because:  Labs not current:  A1C    Next 5 appointments (look out 90 days)    Jun 22, 2021  8:20 AM  Office Visit with Keila Muñoz MD  [facility] ([facility] ) [address] [place] [id number]  [phone]        Chelsea WOODSN, RN

## 2021-06-22 ENCOUNTER — OFFICE VISIT (OUTPATIENT)
Dept: FAMILY MEDICINE | Facility: CLINIC | Age: 71
End: 2021-06-22
Payer: COMMERCIAL

## 2021-06-22 VITALS
HEART RATE: 61 BPM | TEMPERATURE: 98.1 F | WEIGHT: 207.9 LBS | OXYGEN SATURATION: 97 % | BODY MASS INDEX: 29 KG/M2 | DIASTOLIC BLOOD PRESSURE: 76 MMHG | RESPIRATION RATE: 16 BRPM | SYSTOLIC BLOOD PRESSURE: 134 MMHG

## 2021-06-22 DIAGNOSIS — I10 ESSENTIAL HYPERTENSION WITH GOAL BLOOD PRESSURE LESS THAN 140/90: ICD-10-CM

## 2021-06-22 DIAGNOSIS — E78.5 HYPERLIPIDEMIA LDL GOAL <70: ICD-10-CM

## 2021-06-22 DIAGNOSIS — E11.9 TYPE 2 DIABETES MELLITUS WITHOUT COMPLICATION, WITHOUT LONG-TERM CURRENT USE OF INSULIN (H): Primary | ICD-10-CM

## 2021-06-22 LAB
ALBUMIN SERPL-MCNC: 4.1 G/DL (ref 3.4–5)
ALP SERPL-CCNC: 45 U/L (ref 40–150)
ALT SERPL W P-5'-P-CCNC: 50 U/L (ref 0–70)
ANION GAP SERPL CALCULATED.3IONS-SCNC: 6 MMOL/L (ref 3–14)
AST SERPL W P-5'-P-CCNC: 29 U/L (ref 0–45)
BILIRUB SERPL-MCNC: 1.3 MG/DL (ref 0.2–1.3)
BUN SERPL-MCNC: 15 MG/DL (ref 7–30)
CALCIUM SERPL-MCNC: 9.4 MG/DL (ref 8.5–10.1)
CHLORIDE SERPL-SCNC: 103 MMOL/L (ref 94–109)
CHOLEST SERPL-MCNC: 145 MG/DL
CO2 SERPL-SCNC: 28 MMOL/L (ref 20–32)
CREAT SERPL-MCNC: 0.76 MG/DL (ref 0.66–1.25)
CREAT UR-MCNC: 80 MG/DL
GFR SERPL CREATININE-BSD FRML MDRD: >90 ML/MIN/{1.73_M2}
GLUCOSE SERPL-MCNC: 128 MG/DL (ref 70–99)
HBA1C MFR BLD: 6.8 % (ref 0–5.6)
HDLC SERPL-MCNC: 49 MG/DL
LDLC SERPL CALC-MCNC: 53 MG/DL
MICROALBUMIN UR-MCNC: 6 MG/L
MICROALBUMIN/CREAT UR: 7.85 MG/G CR (ref 0–17)
NONHDLC SERPL-MCNC: 96 MG/DL
POTASSIUM SERPL-SCNC: 3.8 MMOL/L (ref 3.4–5.3)
PROT SERPL-MCNC: 7.2 G/DL (ref 6.8–8.8)
SODIUM SERPL-SCNC: 137 MMOL/L (ref 133–144)
TRIGL SERPL-MCNC: 214 MG/DL

## 2021-06-22 PROCEDURE — 80053 COMPREHEN METABOLIC PANEL: CPT | Performed by: FAMILY MEDICINE

## 2021-06-22 PROCEDURE — 82043 UR ALBUMIN QUANTITATIVE: CPT | Performed by: FAMILY MEDICINE

## 2021-06-22 PROCEDURE — 83036 HEMOGLOBIN GLYCOSYLATED A1C: CPT | Performed by: FAMILY MEDICINE

## 2021-06-22 PROCEDURE — 80061 LIPID PANEL: CPT | Performed by: FAMILY MEDICINE

## 2021-06-22 PROCEDURE — 99214 OFFICE O/P EST MOD 30 MIN: CPT | Performed by: FAMILY MEDICINE

## 2021-06-22 PROCEDURE — 36415 COLL VENOUS BLD VENIPUNCTURE: CPT | Performed by: FAMILY MEDICINE

## 2021-06-22 RX ORDER — CHOLECALCIFEROL (VITAMIN D3) 50 MCG
TABLET ORAL
COMMUNITY
Start: 2020-10-01

## 2021-06-22 RX ORDER — LISINOPRIL AND HYDROCHLOROTHIAZIDE 20; 25 MG/1; MG/1
1 TABLET ORAL DAILY
Qty: 90 TABLET | Refills: 1 | Status: SHIPPED | OUTPATIENT
Start: 2021-06-22 | End: 2022-02-14

## 2021-06-22 RX ORDER — ATORVASTATIN CALCIUM 10 MG/1
10 TABLET, FILM COATED ORAL DAILY
Qty: 90 TABLET | Refills: 1 | Status: SHIPPED | OUTPATIENT
Start: 2021-06-22 | End: 2022-01-25

## 2021-06-22 RX ORDER — GLIPIZIDE 5 MG/1
5 TABLET, FILM COATED, EXTENDED RELEASE ORAL DAILY
Qty: 90 TABLET | Refills: 1 | Status: SHIPPED | OUTPATIENT
Start: 2021-06-22 | End: 2022-01-19

## 2021-06-22 NOTE — PROGRESS NOTES
"    Assessment & Plan     Type 2 diabetes mellitus without complication, without long-term current use of insulin (H)  Last A1c 5.8 with a 20+ pound weight loss.  Weight up slightly from then but still way down so we will recheck and adjust as needed.  No hypoglycemia.    Essential hypertension with goal blood pressure less than 140/90  Borderline on current regimen but acceptable.  We will continue to follow    Hyperlipidemia LDL goal <70  Recheck and adjust as needed         BMI:   Estimated body mass index is 29 kg/m  as calculated from the following:    Height as of 9/28/20: 1.803 m (5' 11\").    Weight as of this encounter: 94.3 kg (207 lb 14.4 oz).       See Patient Instructions    Return in about 6 months (around 12/22/2021) for Diabetes follow up, In Office or Video, Previsit labwork.    Keila Muñoz MD  [facility] JASPER Chowdary is a 71 year old who presents for the following health issues     History of Present Illness       Diabetes:   He presents for follow up of diabetes.  He is not checking blood glucose. He has no concerns regarding his diabetes at this time.  He is not experiencing numbness or burning in feet, excessive thirst, blurry vision, weight changes or redness, sores or blisters on feet.         Hyperlipidemia:  He presents for follow up of hyperlipidemia.  He is taking medication to lower cholesterol. He is not having myalgia or other side effects to statin medications.    Hypertension: He presents for follow up of hypertension.  He does not check blood pressure  regularly outside of the clinic. Outpatient blood pressures have not been over 140/90. He does not follow a low salt diet.     He eats 2-3 servings of fruits and vegetables daily.He consumes 0 sweetened beverage(s) daily.He exercises with enough effort to increase his heart rate 20 to 29 minutes per day.  He exercises with enough effort to increase his heart rate 4 days per week.   He is taking " medications regularly.       Diabetes Follow-up      How often are you checking your blood sugar? Not at all    What concerns do you have today about your diabetes? None     Do you have any of these symptoms? (Select all that apply)  No numbness or tingling in feet.  No redness, sores or blisters on feet.  No complaints of excessive thirst.  No reports of blurry vision.  No significant changes to weight.              Hyperlipidemia Follow-Up      Are you regularly taking any medication or supplement to lower your cholesterol?   Yes- atorvastatin    Are you having muscle aches or other side effects that you think could be caused by your cholesterol lowering medication?  No    Hypertension Follow-up      Do you check your blood pressure regularly outside of the clinic? Yes     Are you following a low salt diet? Yes    Are your blood pressures ever more than 140 on the top number (systolic) OR more   than 90 on the bottom number (diastolic), for example 140/90? No    BP Readings from Last 2 Encounters:   06/22/21 134/76   09/28/20 136/80     Hemoglobin A1C (%)   Date Value   09/21/2020 5.8 (H)   03/03/2020 8.7 (H)     LDL Cholesterol Calculated (mg/dL)   Date Value   09/21/2020 50   08/27/2019 63         How many servings of fruits and vegetables do you eat daily?  2-3    On average, how many sweetened beverages do you drink each day (Examples: soda, juice, sweet tea, etc.  Do NOT count diet or artificially sweetened beverages)?   0    How many days per week do you exercise enough to make your heart beat faster? 5    How many minutes a day do you exercise enough to make your heart beat faster? 30 - 60    How many days per week do you miss taking your medication? 0    Here today in follow-up of diabetes, hypertension, lipids.  Doing well.  Reports no interval health concerns.      Review of Systems   Constitutional, HEENT, cardiovascular, pulmonary, gi and gu systems are negative, except as otherwise noted.       Objective    /76   Pulse 61   Temp 98.1  F (36.7  C) (Oral)   Resp 16   Wt 94.3 kg (207 lb 14.4 oz)   SpO2 97%   BMI 29.00 kg/m    Body mass index is 29 kg/m .  Physical Exam   Alert, pleasant, upbeat, and in no apparent discomfort.  S1 and S2 normal, no murmurs, clicks, gallops or rubs. Regular rate and rhythm. Chest is clear; no wheezes or rales. No edema or JVD.  Past labs reviewed with the patient.

## 2021-06-23 NOTE — RESULT ENCOUNTER NOTE
Epi,  Overall everything looks just fine.  Yes, that A1c is up a little bit from last time but still perfectly well controlled.  So I would say just keep doing what you are doing.  And we can plan to recheck in 6 months.  MARIELLE Muñoz M.D.

## 2021-09-26 ENCOUNTER — HEALTH MAINTENANCE LETTER (OUTPATIENT)
Age: 71
End: 2021-09-26

## 2021-11-16 DIAGNOSIS — E11.9 TYPE 2 DIABETES MELLITUS WITHOUT COMPLICATION, WITHOUT LONG-TERM CURRENT USE OF INSULIN (H): ICD-10-CM

## 2021-11-16 NOTE — TELEPHONE ENCOUNTER
Prescription approved per [facility] Refill Protocol.  Rafaela Lyn RN, BSN   [facility] [place]

## 2021-11-21 ENCOUNTER — HEALTH MAINTENANCE LETTER (OUTPATIENT)
Age: 71
End: 2021-11-21

## 2022-01-16 ENCOUNTER — HEALTH MAINTENANCE LETTER (OUTPATIENT)
Age: 72
End: 2022-01-16

## 2022-01-19 DIAGNOSIS — E11.9 TYPE 2 DIABETES MELLITUS WITHOUT COMPLICATION, WITHOUT LONG-TERM CURRENT USE OF INSULIN (H): ICD-10-CM

## 2022-01-19 RX ORDER — GLIPIZIDE 5 MG/1
5 TABLET, FILM COATED, EXTENDED RELEASE ORAL DAILY
Qty: 90 TABLET | Refills: 0 | Status: SHIPPED | OUTPATIENT
Start: 2022-01-19 | End: 2022-04-19

## 2022-01-19 NOTE — TELEPHONE ENCOUNTER
Routing refill request to provider for review/approval because:  Drug not on the [facility] refill protocol   Nilda Lizama BSN, RN

## 2022-01-25 DIAGNOSIS — E78.5 HYPERLIPIDEMIA LDL GOAL <70: ICD-10-CM

## 2022-01-25 RX ORDER — ATORVASTATIN CALCIUM 10 MG/1
TABLET, FILM COATED ORAL
Qty: 90 TABLET | Refills: 1 | Status: SHIPPED | OUTPATIENT
Start: 2022-01-25 | End: 2022-06-24

## 2022-02-12 DIAGNOSIS — E11.9 TYPE 2 DIABETES MELLITUS WITHOUT COMPLICATION, WITHOUT LONG-TERM CURRENT USE OF INSULIN (H): ICD-10-CM

## 2022-02-13 DIAGNOSIS — I10 ESSENTIAL HYPERTENSION WITH GOAL BLOOD PRESSURE LESS THAN 140/90: ICD-10-CM

## 2022-02-14 RX ORDER — LISINOPRIL AND HYDROCHLOROTHIAZIDE 20; 25 MG/1; MG/1
TABLET ORAL
Qty: 90 TABLET | Refills: 0 | Status: SHIPPED | OUTPATIENT
Start: 2022-02-14 | End: 2022-05-17

## 2022-02-14 NOTE — TELEPHONE ENCOUNTER
Routing refill request to provider for review/approval because:  Labs not current:    Lab Results   Component Value Date    A1C 6.8 06/22/2021    A1C 5.8 09/21/2020    A1C 8.7 03/03/2020    A1C 7.8 08/27/2019    A1C 7.7 03/11/2019     Next 5 appointments (look out 90 days)    Feb 28, 2022  2:00 PM  (Arrive by 1:40 PM)  Annual Wellness Visit with Keila Muñoz MD  [facility] ([facility] ) [address] [place] [id number]  [phone]        Delphine Terrell RN

## 2022-02-16 ENCOUNTER — LAB (OUTPATIENT)
Dept: LAB | Facility: CLINIC | Age: 72
End: 2022-02-16
Payer: COMMERCIAL

## 2022-02-16 DIAGNOSIS — E11.9 TYPE 2 DIABETES MELLITUS WITHOUT COMPLICATION, WITHOUT LONG-TERM CURRENT USE OF INSULIN (H): ICD-10-CM

## 2022-02-16 LAB
ANION GAP SERPL CALCULATED.3IONS-SCNC: 2 MMOL/L (ref 3–14)
BUN SERPL-MCNC: 17 MG/DL (ref 7–30)
CALCIUM SERPL-MCNC: 9.4 MG/DL (ref 8.5–10.1)
CHLORIDE BLD-SCNC: 104 MMOL/L (ref 94–109)
CO2 SERPL-SCNC: 33 MMOL/L (ref 20–32)
CREAT SERPL-MCNC: 0.83 MG/DL (ref 0.66–1.25)
GFR SERPL CREATININE-BSD FRML MDRD: >90 ML/MIN/1.73M2
GLUCOSE BLD-MCNC: 151 MG/DL (ref 70–99)
HBA1C MFR BLD: 7.2 % (ref 0–5.6)
POTASSIUM BLD-SCNC: 3.7 MMOL/L (ref 3.4–5.3)
SODIUM SERPL-SCNC: 139 MMOL/L (ref 133–144)

## 2022-02-16 PROCEDURE — 80048 BASIC METABOLIC PNL TOTAL CA: CPT

## 2022-02-16 PROCEDURE — 36415 COLL VENOUS BLD VENIPUNCTURE: CPT

## 2022-02-16 PROCEDURE — 83036 HEMOGLOBIN GLYCOSYLATED A1C: CPT

## 2022-02-28 ENCOUNTER — OFFICE VISIT (OUTPATIENT)
Dept: FAMILY MEDICINE | Facility: CLINIC | Age: 72
End: 2022-02-28
Payer: COMMERCIAL

## 2022-02-28 VITALS
OXYGEN SATURATION: 98 % | TEMPERATURE: 98 F | RESPIRATION RATE: 14 BRPM | WEIGHT: 212.1 LBS | SYSTOLIC BLOOD PRESSURE: 144 MMHG | DIASTOLIC BLOOD PRESSURE: 72 MMHG | BODY MASS INDEX: 29.69 KG/M2 | HEIGHT: 71 IN | HEART RATE: 65 BPM

## 2022-02-28 DIAGNOSIS — Z00.00 ENCOUNTER FOR MEDICARE ANNUAL WELLNESS EXAM: Primary | ICD-10-CM

## 2022-02-28 DIAGNOSIS — E78.5 HYPERLIPIDEMIA LDL GOAL <100: ICD-10-CM

## 2022-02-28 DIAGNOSIS — I10 ESSENTIAL HYPERTENSION WITH GOAL BLOOD PRESSURE LESS THAN 140/90: ICD-10-CM

## 2022-02-28 DIAGNOSIS — E11.9 TYPE 2 DIABETES MELLITUS WITHOUT COMPLICATION, WITHOUT LONG-TERM CURRENT USE OF INSULIN (H): ICD-10-CM

## 2022-02-28 PROCEDURE — G0439 PPPS, SUBSEQ VISIT: HCPCS | Performed by: FAMILY MEDICINE

## 2022-02-28 RX ORDER — MULTIVIT-MIN/IRON/FOLIC ACID/K 18-600-40
500 CAPSULE ORAL 2 TIMES DAILY
COMMUNITY
End: 2023-04-14

## 2022-02-28 ASSESSMENT — ENCOUNTER SYMPTOMS
SORE THROAT: 0
HEMATURIA: 0
COUGH: 0
EYE PAIN: 0
HEMATOCHEZIA: 0
DIZZINESS: 0
PARESTHESIAS: 0
JOINT SWELLING: 0
CHILLS: 0
PALPITATIONS: 0
SHORTNESS OF BREATH: 0
FREQUENCY: 0
HEADACHES: 0
HEARTBURN: 0
WEAKNESS: 0
ARTHRALGIAS: 0
FEVER: 0
NERVOUS/ANXIOUS: 0
MYALGIAS: 0
CONSTIPATION: 0
NAUSEA: 0
DYSURIA: 0
ABDOMINAL PAIN: 0
DIARRHEA: 0

## 2022-02-28 ASSESSMENT — PAIN SCALES - GENERAL: PAINLEVEL: NO PAIN (0)

## 2022-02-28 ASSESSMENT — ACTIVITIES OF DAILY LIVING (ADL): CURRENT_FUNCTION: NO ASSISTANCE NEEDED

## 2022-02-28 NOTE — PROGRESS NOTES
The patient was counseled and encouraged to consider modifying their diet and eating habits. He was provided with information on recommended healthy diet options.  The patient was provided with written information regarding signs of hearing loss.  He is at risk for falling and has been provided with information to reduce the risk of falling at home.

## 2022-02-28 NOTE — PATIENT INSTRUCTIONS
Patient Education   Personalized Prevention Plan  You are due for the preventive services outlined below.  Your care team is available to assist you in scheduling these services.  If you have already completed any of these items, please share that information with your care team to update in your medical record.  Health Maintenance Due   Topic Date Due     Zoster (Shingles) Vaccine (1 of 2) Never done     Discuss Advance Care Planning  09/01/2016     Pneumococcal Vaccine (2 of 2 - PPSV23) 02/22/2018     Diabetic Foot Exam  10/01/2019     FALL RISK ASSESSMENT  09/03/2020     Annual Wellness Visit  09/28/2021     PHQ-2  01/01/2022     Eye Exam  02/23/2022        At your visit today, we discussed your risk for falls and preventive options.    Fall Prevention  Falls often occur due to slipping, tripping or losing your balance. Millions of people fall every year and injure themselves. Here are ways to reduce your risk of falling again.     Think about your fall, was there anything that caused your fall that can be fixed, removed, or replaced?    Make your home safe by keeping walkways clear of objects you may trip over, such as electric cords.    Use non-slip pads under rugs. Don't use area rugs or small throw rugs.    Use non-slip mats in bathtubs and showers.    Install handrails and lights on staircases. The handrails should be on both sides of the stairs.    Don't walk in poorly lit areas.    Don't stand on chairs or wobbly ladders.    Use caution when reaching overhead or looking upward. This position can cause a loss of balance.    Be sure your shoes fit properly, have non-slip bottoms and are in good condition.     Wear shoes both inside and out. Don't go barefoot or wear slippers.    Be cautious when going up and down stairs, curbs, and when walking on uneven sidewalks.    If your balance is poor, consider using a cane or walker.    If your fall was related to alcohol use, stop or limit alcohol intake.     If  your fall was related to use of sleeping medicines, talk to your healthcare provider about this. You may need to reduce your dosage at bedtime if you awaken during the night to go to the bathroom.      To reduce the need for nighttime bathroom trips:  ? Don't drink fluids for several hours before going to bed  ? Empty your bladder before going to bed  ? Men can keep a urinal at the bedside    Stay as active as you can. Balance, flexibility, strength, and endurance all come from exercise. They all play a role in preventing falls. Ask your healthcare provider which types of activity are right for you.    Get your vision checked on a regular basis.    If you have pets, know where they are before you stand up or walk so you don't trip over them.    Use night lights.    Go over all your medicines with a pharmacist or other healthcare provider to see if any of them could make you more likely to fall.  [employer] last reviewed this educational content on 4/1/2018 2000-2021 [employer]. All rights reserved. This information is not intended as a substitute for professional medical care. Always follow your healthcare professional's instructions.        Patient Education   Personalized Prevention Plan  You are due for the preventive services outlined below.  Your care team is available to assist you in scheduling these services.  If you have already completed any of these items, please share that information with your care team to update in your medical record.  Health Maintenance Due   Topic Date Due     Zoster (Shingles) Vaccine (1 of 2) Never done     Pneumococcal Vaccine (2 of 2 - PPSV23) 02/22/2018     Diabetic Foot Exam  10/01/2019     FALL RISK ASSESSMENT  09/03/2020     Eye Exam  02/23/2022       Understanding USDA MyPlate  The USDA has guidelines to help you make healthy food choices. These are called MyPlate. MyPlate shows the food groups that make up healthy meals using the image of a place setting.  Before you eat, think about the healthiest choices for what to put on your plate or in your cup or bowl. To learn more about building a healthy plate, visit www.choosemyplate.gov.    The food groups    Fruits. Any fruit or 100% fruit juice counts as part of the Fruit Group. Fruits may be fresh, canned, frozen, or dried, and may be whole, cut-up, or pureed. Make 1/2 of your plate fruits and vegetables.    Vegetables. Any vegetable or 100% vegetable juice counts as a member of the Vegetable Group. Vegetables may be fresh, frozen, canned, or dried. They can be served raw or cooked and may be whole, cut-up, or mashed. Make 1/2 of your plate fruits and vegetables.    Grains. All foods made from grains are part of the Grains Group. These include wheat, rice, oats, cornmeal, and barley. Grains are often used to make foods such as bread, pasta, oatmeal, cereal, tortillas, and grits. Grains should be no more than 1/4 of your plate. At least half of your grains should be whole grains.    Protein. This group includes meat, poultry, seafood, beans and peas, eggs, processed soy products (such as tofu), nuts (including nut butters), and seeds. Make protein choices no more than 1/4 of your plate. Meat and poultry choices should be lean or low fat.    Dairy. The Dairy Group includes all fluid milk products and foods made from milk that contain calcium, such as yogurt and cheese. (Foods that have little calcium, such as cream, butter, and cream cheese, are not part of this group.) Most dairy choices should be low-fat or fat-free.    Oils. Oils aren't a food group, but they do contain essential nutrients. However it's important to watch your intake of oils. These are fats that are liquid at room temperature. They include canola, corn, olive, soybean, vegetable, and sunflower oil. Foods that are mainly oil include mayonnaise, certain salad dressings, and soft margarines. You likely already get your daily oil allowance from the foods  you eat.  Things to limit  Eating healthy also means limiting these things in your diet:       Salt (sodium). Many processed foods have a lot of sodium. To keep sodium intake down, eat fresh vegetables, meats, poultry, and seafood when possible. Purchase low-sodium, reduced-sodium, or no-salt-added food products at the store. And don't add salt to your meals at home. Instead, season them with herbs and spices such as dill, oregano, cumin, and paprika. Or try adding flavor with lemon or lime zest and juice.    Saturated fat. Saturated fats are most often found in animal products such as beef, pork, and chicken. They are often solid at room temperature, such as butter. To reduce your saturated fat intake, choose leaner cuts of meat and poultry. And try healthier cooking methods such as grilling, broiling, roasting, or baking. For a simple lower-fat swap, use plain nonfat yogurt instead of mayonnaise when making potato salad or macaroni salad.    Added sugars. These are sugars added to foods. They are in foods such as ice cream, candy, soda, fruit drinks, sports drinks, energy drinks, cookies, pastries, jams, and syrups. Cut down on added sugars by sharing sweet treats with a family member or friend. You can also choose fruit for dessert, and drink water or other unsweetened beverages.     [employer] last reviewed this educational content on 6/1/2020 2000-2021 [employer]. All rights reserved. This information is not intended as a substitute for professional medical care. Always follow your healthcare professional's instructions.          Signs of Hearing Loss      Hearing much better with one ear can be a sign of hearing loss.   Hearing loss is a problem shared by many people. In fact, it is one of the most common health problems, particularly as people age. Most people age 65 and older have some hearing loss. By age 80, almost everyone does. Hearing loss often occurs slowly over the years. So you may  not realize your hearing has gotten worse.  Have your hearing checked  Call your healthcare provider if you:    Have to strain to hear normal conversation    Have to watch other people s faces very carefully to follow what they re saying    Need to ask people to repeat what they ve said    Often misunderstand what people are saying    Turn the volume of the television or radio up so high that others complain    Feel that people are mumbling when they re talking to you    Find that the effort to hear leaves you feeling tired and irritated    Notice, when using the phone, that you hear better with one ear than the other  [employer] last reviewed this educational content on 1/1/2020 2000-2021 [employer]. All rights reserved. This information is not intended as a substitute for professional medical care. Always follow your healthcare professional's instructions.          Preventing Falls at Home  A person can fall for many reasons. Older adults may fall because reaction time slows down as we age. Your muscles and joints may get stiff, weak, or less flexible because of illness, medicines, or a physical condition.   Other health problems that make falls more likely include:     Arthritis    Dizziness or lightheadedness when you stand up (orthostatic hypotension)    History of a stroke    Dizziness    Anemia    Certain medicines taken for mental illness or to control blood pressure.    Problems with balance or gait    Bladder or urinary problems    History of falling    Changes in vision (vision impairment)    Changes in thinking skills and memory (cognitive impairment)  Injuries from a fall can include serious injuries such as broken bones, dislocated joints, internal bleeding and cuts. Injuries like these can limit your independence.   Prevention tips  To help prevent falls and fall-related injuries, follow the tips below.    Floors  To make floors safer:     Put nonskid pads under area rugs.    Remove  small rugs.    Replace worn floor coverings.    Tack carpets firmly to each step on carpeted stairs. Put nonskid strips on the edges of uncarpeted stairs.    Keep floors and stairs free of clutter and cords.    Arrange furniture so there are clear pathways.    Clean up any spills right away.  Bathrooms    To make bathrooms safer:     Install grab bars in the tub or shower.    Apply nonskid strips or put a nonskid rubber mat in the tub or shower.    Sit on a bath chair to bathe.    Use bathmats with nonskid backing.  Lighting  To improve visibility in your home:      Keep a flashlight in each room. Or put a lamp next to the bed within easy reach.    Put nightlights in the bedrooms, hallways, kitchen, and bathrooms.    Make sure all stairways have good lighting.    Take your time when going up and down stairs.    Put handrails on both sides of stairs and in walkways for more support. To prevent injury to your wrist or arm, don t use handrails to pull yourself up.    Install grab bars to pull yourself up.    Move or rearrange items that you use often. This will make them easier to find or reach.    Look at your home to find any safety hazards. Especially look at doorways, walkways, and the driveway. Remove or repair any safety problems that you find.  Other changes to make    Look around to find any safety hazards. Look closely at doorways, walkways, and the driveway. Remove or repair any safety problems that you find.    Wear shoes that fit well.    Take your time when going up and down stairs.    Put handrails on both sides of stairs and in walkways for more support. To prevent injury to your wrist or arm, don t use handrails to pull yourself up.    Install grab bars wherever needed to pull yourself up.    Arrange items that you use often. This will make them easier to find or reach.    [employer] last reviewed this educational content on 3/1/2020    6245-8498 [employer]. All rights reserved. This  information is not intended as a substitute for professional medical care. Always follow your healthcare professional's instructions.

## 2022-02-28 NOTE — PROGRESS NOTES
"SUBJECTIVE:   Rick Sandhu is a 72 year old male who presents for Preventive Visit.      Patient has been advised of split billing requirements and indicates understanding: Yes  Are you in the first 12 months of your Medicare coverage?  No    Healthy Habits:     In general, how would you rate your overall health?  Good    Frequency of exercise:  2-3 days/week    Duration of exercise:  15-30 minutes    Do you usually eat at least 4 servings of fruit and vegetables a day, include whole grains    & fiber and avoid regularly eating high fat or \"junk\" foods?  No    Taking medications regularly:  Yes    Medication side effects:  No muscle aches    Ability to successfully perform activities of daily living:  No assistance needed    Home Safety:  No safety concerns identified    Hearing Impairment:  Difficulty following a conversation in a noisy restaurant or crowded room, difficulty following dialogue in the theater, difficult to understand a speaker at a public meeting or Synagogue service, need to ask people to speak up or repeat themselves and difficulty understanding soft or whispered speech    In the past 6 months, have you been bothered by leaking of urine?  No    In general, how would you rate your overall mental or emotional health?  Good      PHQ-2 Total Score: 1    Additional concerns today:  No    Do you feel safe in your environment? Yes    Have you ever done Advance Care Planning? (For example, a Health Directive, POLST, or a discussion with a medical provider or your loved ones about your wishes): No, advance care planning information given to patient to review.  Patient plans to discuss their wishes with loved ones or provider.       Fall risk  Fallen 2 or more times in the past year?: (P) Yes  Any fall with injury in the past year?: (P) Yes    Cognitive Screening   1) Repeat 3 items (Leader, Season, Table)    2) Clock draw: NORMAL  3) 3 item recall: Recalls 3 objects  Results: 3 items recalled: COGNITIVE " IMPAIRMENT LESS LIKELY    Mini-CogTM Copyright JEAN Mendoza. Licensed by the author for use in [facility]; reprinted with permission (greg@.[facility]). All rights reserved.      Do you have sleep apnea, excessive snoring or daytime drowsiness?: no    Reviewed and updated as needed this visit by clinical staff   Tobacco  Allergies  Meds   Med Hx  Surg Hx  Fam Hx  Soc Hx        Reviewed and updated as needed this visit by Provider                 Social History     Tobacco Use     Smoking status: Former Smoker     Packs/day: 0.00     Years: 0.00     Pack years: 0.00     Start date: 1964     Quit date: 8/15/1987     Years since quittin.5     Smokeless tobacco: Never Used   Substance Use Topics     Alcohol use: Yes     Alcohol/week: 0.0 standard drinks     Comment: 1-2 drinks every other week     If you drink alcohol do you typically have >3 drinks per day or >7 drinks per week? No    Alcohol Use 2022   Prescreen: >3 drinks/day or >7 drinks/week? No   Prescreen: >3 drinks/day or >7 drinks/week? -   No flowsheet data found.      Current providers sharing in care for this patient include:   Patient Care Team:  Keila Muñoz MD as PCP - General (Family Practice)  Man Bedolla MD as Assigned Surgical Provider  Keila Muñoz MD as Assigned PCP    The following health maintenance items are reviewed in Epic and correct as of today:  Health Maintenance Due   Topic Date Due     ZOSTER IMMUNIZATION (1 of 2) Never done     Pneumococcal Vaccine: 65+ Years (2 of 2 - PPSV23) 2018     DIABETIC FOOT EXAM  10/01/2019     FALL RISK ASSESSMENT  2020     EYE EXAM  2022     Labs reviewed in EPIC  BP Readings from Last 3 Encounters:   22 (!) 144/72   21 134/76   20 136/80    Wt Readings from Last 3 Encounters:   22 96.2 kg (212 lb 1.6 oz)   21 94.3 kg (207 lb 14.4 oz)   20 90.3 kg (199 lb)            Here today for routine wellness exam  "and follow-up on diabetes, hypertension, lipids  Doing well.  Reports no interval health concerns.        Review of Systems   Constitutional: Negative for chills and fever.   HENT: Positive for hearing loss. Negative for congestion, ear pain and sore throat.    Eyes: Negative for pain and visual disturbance.   Respiratory: Negative for cough and shortness of breath.    Cardiovascular: Negative for chest pain, palpitations and peripheral edema.   Gastrointestinal: Negative for abdominal pain, constipation, diarrhea, heartburn, hematochezia and nausea.   Genitourinary: Positive for impotence. Negative for dysuria, frequency, genital sores, hematuria, penile discharge and urgency.   Musculoskeletal: Negative for arthralgias, joint swelling and myalgias.   Skin: Negative for rash.   Neurological: Negative for dizziness, weakness, headaches and paresthesias.   Psychiatric/Behavioral: Negative for mood changes. The patient is not nervous/anxious.        OBJECTIVE:   BP (!) 144/72   Pulse 65   Temp 98  F (36.7  C) (Oral)   Resp 14   Ht 1.791 m (5' 10.5\")   Wt 96.2 kg (212 lb 1.6 oz)   SpO2 98%   BMI 30.00 kg/m   Estimated body mass index is 30 kg/m  as calculated from the following:    Height as of this encounter: 1.791 m (5' 10.5\").    Weight as of this encounter: 96.2 kg (212 lb 1.6 oz).  Physical Exam  GENERAL: healthy, alert and no distress  EYES: Eyes grossly normal to inspection, PERRL and conjunctivae and sclerae normal  HENT: ear canals and TM's normal, nose and mouth without ulcers or lesions  NECK: no adenopathy, no asymmetry, masses, or scars and thyroid normal to palpation  RESP: lungs clear to auscultation - no rales, rhonchi or wheezes  CV: regular rate and rhythm, normal S1 S2, no S3 or S4, no murmur, click or rub, no peripheral edema and peripheral pulses strong  ABDOMEN: soft, nontender, no hepatosplenomegaly, no masses and bowel sounds normal  MS: no gross musculoskeletal defects noted, no " "edema  SKIN: no suspicious lesions or rashes  NEURO: Normal strength and tone, mentation intact and speech normal  PSYCH: mentation appears normal, affect normal/bright    Diagnostic Test Results:  Labs reviewed in Epic    ASSESSMENT / PLAN:   (Z00.00) Encounter for Medicare annual wellness exam  (primary encounter diagnosis)  Comment: Routine health maintenance up-to-date otherwise  Plan:     (E11.9) Type 2 diabetes mellitus without complication, without long-term current use of insulin (H)  Comment: Stable on current regimen.  Continue same plan and routine follow-up.   Plan: metFORMIN (GLUCOPHAGE) 500 MG tablet            (I10) Essential hypertension with goal blood pressure less than 140/90  Comment: Stable on current regimen.  Continue same plan and routine follow-up.   Plan:     (E78.5) Hyperlipidemia LDL goal <100  Comment: Stable on current regimen.  Continue same plan and routine follow-up.   Plan:     Patient has been advised of split billing requirements and indicates understanding: Yes    COUNSELING:  Reviewed preventive health counseling, as reflected in patient instructions       Regular exercise       Healthy diet/nutrition       Vision screening       Hearing screening       Dental care       Colon cancer screening       Prostate cancer screening    Estimated body mass index is 30 kg/m  as calculated from the following:    Height as of this encounter: 1.791 m (5' 10.5\").    Weight as of this encounter: 96.2 kg (212 lb 1.6 oz).        He reports that he quit smoking about 34 years ago. He started smoking about 58 years ago. He smoked 0.00 packs per day for 0.00 years. He has never used smokeless tobacco.      Appropriate preventive services were discussed with this patient, including applicable screening as appropriate for cardiovascular disease, diabetes, osteopenia/osteoporosis, and glaucoma.  As appropriate for age/gender, discussed screening for colorectal cancer, prostate cancer, breast cancer, " and cervical cancer. Checklist reviewing preventive services available has been given to the patient.    Reviewed patients plan of care and provided an AVS. The Basic Care Plan (routine screening as documented in Health Maintenance) for Rick meets the Care Plan requirement. This Care Plan has been established and reviewed with the Patient.    Counseling Resources:  ATP IV Guidelines  Pooled Cohorts Equation Calculator  Breast Cancer Risk Calculator  Breast Cancer: Medication to Reduce Risk  FRAX Risk Assessment  ICSI Preventive Guidelines  Dietary Guidelines for Americans, 2010  USDA's MyPlate  ASA Prophylaxis  Lung CA Screening    Keila Muñoz MD  [facility]    Identified Health Risks:

## 2022-03-01 ENCOUNTER — OFFICE VISIT (OUTPATIENT)
Dept: OPHTHALMOLOGY | Facility: CLINIC | Age: 72
End: 2022-03-01
Payer: COMMERCIAL

## 2022-03-01 DIAGNOSIS — H43.813 POSTERIOR VITREOUS DETACHMENT, BILATERAL: ICD-10-CM

## 2022-03-01 DIAGNOSIS — E11.9 TYPE 2 DIABETES MELLITUS WITHOUT COMPLICATION, WITHOUT LONG-TERM CURRENT USE OF INSULIN (H): Primary | ICD-10-CM

## 2022-03-01 DIAGNOSIS — Z01.01 ENCOUNTER FOR EXAMINATION OF EYES AND VISION WITH ABNORMAL FINDINGS: ICD-10-CM

## 2022-03-01 DIAGNOSIS — H52.4 PRESBYOPIA: ICD-10-CM

## 2022-03-01 DIAGNOSIS — H25.813 COMBINED FORMS OF AGE-RELATED CATARACT OF BOTH EYES: ICD-10-CM

## 2022-03-01 PROCEDURE — 92015 DETERMINE REFRACTIVE STATE: CPT | Performed by: OPHTHALMOLOGY

## 2022-03-01 PROCEDURE — 92014 COMPRE OPH EXAM EST PT 1/>: CPT | Performed by: OPHTHALMOLOGY

## 2022-03-01 ASSESSMENT — REFRACTION_MANIFEST
OS_SPHERE: -5.25
OD_SPHERE: -6.50
OS_AXIS: 110
OD_AXIS: 066
OD_ADD: +2.75
OS_ADD: +2.75
OS_CYLINDER: +1.75
OD_CYLINDER: +1.25

## 2022-03-01 ASSESSMENT — VISUAL ACUITY
OS_CC: 2-
METHOD: SNELLEN - LINEAR
OS_PH_CC: 30-2
OD_CC+: -1
CORRECTION_TYPE: GLASSES
OD_CC: 20/30
OD_CC: 1-
OS_CC: 20/40

## 2022-03-01 ASSESSMENT — CUP TO DISC RATIO
OD_RATIO: 0.3
OS_RATIO: 0.3

## 2022-03-01 ASSESSMENT — TONOMETRY
OD_IOP_MMHG: 16
OS_IOP_MMHG: 16
IOP_METHOD: APPLANATION

## 2022-03-01 ASSESSMENT — REFRACTION_WEARINGRX
OS_CYLINDER: +1.20
OS_ADD: +2.50
OS_AXIS: 089
SPECS_TYPE: PAL
OD_ADD: +2.50
OS_SPHERE: -5.00
OD_CYLINDER: +1.25
OD_SPHERE: -6.25
OD_AXIS: 070

## 2022-03-01 ASSESSMENT — CONF VISUAL FIELD
OD_NORMAL: 1
OS_NORMAL: 1

## 2022-03-01 ASSESSMENT — EXTERNAL EXAM - LEFT EYE: OS_EXAM: 2+ BROW PTOSIS, ROSACEA

## 2022-03-01 ASSESSMENT — EXTERNAL EXAM - RIGHT EYE: OD_EXAM: 2+ BROW PTOSIS, ROSACEA

## 2022-03-01 NOTE — PATIENT INSTRUCTIONS
Glasses Rx given - optional  May use artificial tears up to 4 times daily both eyes.  (Refresh Tears, Systane Ultra/Balance, or Theratears).  Call in November 2022 for an appointment in March 2023 for Complete Exam    Dr. Bedolla [phone]    Patient Education   Diabetes weakens the blood vessels all over the body, including the eyes. Damage to the blood vessels in the eyes can cause swelling or bleeding into part of the eye (called the retina). This is called diabetic retinopathy ([facility]-tin--[facility]-the). If not treated, this disease can cause vision loss or blindness.   Symptoms may include blurred or distorted vision, but many people have no symptoms. It's important to see your eye doctor regularly to check for problems.   Early treatment and good control can help protect your vision. Here are the things you can do to help prevent vision loss:      1. Keep your blood sugar levels under tight control.      2. Bring high blood pressure under control.      3. No smoking.      4. Have yearly dilated eye exams.

## 2022-03-01 NOTE — LETTER
3/1/2022         RE: Rick Sandhu  [address] [address]        Dear Colleague,    Thank you for referring your patient, Rick Sandhu, to the [facility]. Please see a copy of my visit note below.     Current Eye Medications:  None.     Subjective:  Patient is here for a Diabetic Eye Exam.  Patient complains of some decreasing vision in each eye, distance and near.    Squints.  Okay with current vision and night driving.     Lab Results   Component Value Date    A1C 7.2 02/16/2022    A1C 6.8 06/22/2021    A1C 5.8 09/21/2020    A1C 8.7 03/03/2020    A1C 7.8 08/27/2019    A1C 7.7 03/11/2019        Objective:  See Ophthalmology Exam.       Assessment:  Stable eye exam.  No diabetic retinopathy.      ICD-10-CM    1. Type 2 diabetes mellitus without complication, without long-term current use of insulin (H)  E11.9    2. Combined forms of age-related cataract, mild-mod, of both eyes  H25.813    3. Posterior vitreous detachment, bilateral  H43.813    4. Encounter for examination of eyes and vision with abnormal findings  Z01.01    5. Presbyopia  H52.4         Plan:  Glasses Rx given - optional  May use artificial tears up to 4 times daily both eyes.  (Refresh Tears, Systane Ultra/Balance, or Theratears).  Call in November 2022 for an appointment in March 2023 for Complete Exam    Dr. Bedolla [phone]           Again, thank you for allowing me to participate in the care of your patient.        Sincerely,        Man Bedolla MD

## 2022-03-01 NOTE — PROGRESS NOTES
Current Eye Medications:  None.     Subjective:  Patient is here for a Diabetic Eye Exam.  Patient complains of some decreasing vision in each eye, distance and near.    Squints.  Okay with current vision and night driving.     Lab Results   Component Value Date    A1C 7.2 02/16/2022    A1C 6.8 06/22/2021    A1C 5.8 09/21/2020    A1C 8.7 03/03/2020    A1C 7.8 08/27/2019    A1C 7.7 03/11/2019        Objective:  See Ophthalmology Exam.       Assessment:  Stable eye exam.  No diabetic retinopathy.      ICD-10-CM    1. Type 2 diabetes mellitus without complication, without long-term current use of insulin (H)  E11.9    2. Combined forms of age-related cataract, mild-mod, of both eyes  H25.813    3. Posterior vitreous detachment, bilateral  H43.813    4. Encounter for examination of eyes and vision with abnormal findings  Z01.01    5. Presbyopia  H52.4         Plan:  Glasses Rx given - optional  May use artificial tears up to 4 times daily both eyes.  (Refresh Tears, Systane Ultra/Balance, or Theratears).  Call in November 2022 for an appointment in March 2023 for Complete Exam    Dr. Bedolla [phone]

## 2022-05-14 ENCOUNTER — TELEPHONE (OUTPATIENT)
Dept: FAMILY MEDICINE | Facility: CLINIC | Age: 72
End: 2022-05-14
Payer: COMMERCIAL

## 2022-05-14 DIAGNOSIS — I10 ESSENTIAL HYPERTENSION WITH GOAL BLOOD PRESSURE LESS THAN 140/90: ICD-10-CM

## 2022-05-17 RX ORDER — LISINOPRIL AND HYDROCHLOROTHIAZIDE 20; 25 MG/1; MG/1
1 TABLET ORAL DAILY
Qty: 30 TABLET | Refills: 0 | Status: SHIPPED | OUTPATIENT
Start: 2022-05-17 | End: 2022-06-14

## 2022-05-17 NOTE — TELEPHONE ENCOUNTER
Routing refill request to provider for review/approval because:  Patient needs to be seen because:  BP out of range

## 2022-05-17 NOTE — TELEPHONE ENCOUNTER
Given one month  Blood pressure not at goal- assist with scheduling follow up with Dr. Muñoz- dameon keane

## 2022-05-18 NOTE — TELEPHONE ENCOUNTER
Calvinm for pt to schedule virtual appointment with Dr. Muñoz. I provided clinic phone number for pt to call back and schedule.

## 2022-06-11 DIAGNOSIS — I10 ESSENTIAL HYPERTENSION WITH GOAL BLOOD PRESSURE LESS THAN 140/90: ICD-10-CM

## 2022-06-14 RX ORDER — LISINOPRIL AND HYDROCHLOROTHIAZIDE 20; 25 MG/1; MG/1
1 TABLET ORAL DAILY
Qty: 30 TABLET | Refills: 0 | Status: SHIPPED | OUTPATIENT
Start: 2022-06-14 | End: 2022-06-24

## 2022-06-14 NOTE — TELEPHONE ENCOUNTER
Routing refill request to provider for review/approval because:  BP Readings from Last 3 Encounters:   02/28/22 (!) 144/72   06/22/21 134/76   09/28/20 136/80     Appointments in Next Year      Jun 24, 2022 11:20 AM  (Arrive by 11:00 AM)  Provider Visit with Keila Muñoz MD  [facility] ([facility] ) [phone]          Kristin Gandhi RN, BSN  [facility]

## 2022-06-24 ENCOUNTER — VIRTUAL VISIT (OUTPATIENT)
Dept: FAMILY MEDICINE | Facility: CLINIC | Age: 72
End: 2022-06-24
Payer: COMMERCIAL

## 2022-06-24 DIAGNOSIS — E11.9 TYPE 2 DIABETES MELLITUS WITHOUT COMPLICATION, WITHOUT LONG-TERM CURRENT USE OF INSULIN (H): Primary | ICD-10-CM

## 2022-06-24 DIAGNOSIS — I10 ESSENTIAL HYPERTENSION WITH GOAL BLOOD PRESSURE LESS THAN 140/90: ICD-10-CM

## 2022-06-24 DIAGNOSIS — E78.5 HYPERLIPIDEMIA LDL GOAL <70: ICD-10-CM

## 2022-06-24 PROCEDURE — 99214 OFFICE O/P EST MOD 30 MIN: CPT | Mod: 95 | Performed by: FAMILY MEDICINE

## 2022-06-24 RX ORDER — LISINOPRIL AND HYDROCHLOROTHIAZIDE 20; 25 MG/1; MG/1
1 TABLET ORAL DAILY
Qty: 90 TABLET | Refills: 1 | Status: SHIPPED | OUTPATIENT
Start: 2022-06-24 | End: 2023-01-18

## 2022-06-24 RX ORDER — ATORVASTATIN CALCIUM 10 MG/1
10 TABLET, FILM COATED ORAL DAILY
Qty: 90 TABLET | Refills: 1 | Status: SHIPPED | OUTPATIENT
Start: 2022-06-24 | End: 2022-12-20

## 2022-06-24 NOTE — PROGRESS NOTES
Epi is a 72 year old who is being evaluated via a billable telephone visit.      What phone number would you like to be contacted at? [phone]  How would you like to obtain your AVS? MyChart    Assessment & Plan     Type 2 diabetes mellitus without complication, without long-term current use of insulin (H)  Has been under good control.  Off metformin now due for recheck this fall.  - COMPREHENSIVE METABOLIC PANEL; Future  - Lipid panel reflex to direct LDL Fasting; Future  - Albumin Random Urine Quantitative with Creat Ratio; Future  - Hemoglobin A1c; Future    Essential hypertension with goal blood pressure less than 140/90  Stable on current regimen.  Continue same plan and routine follow-up.  Home blood pressure readings are fantastic.  - lisinopril-hydrochlorothiazide (ZESTORETIC) 20-25 MG tablet; Take 1 tablet by mouth daily    Hyperlipidemia LDL goal <70  Recheck this fall and adjust accordingly  - atorvastatin (LIPITOR) 10 MG tablet; Take 1 tablet (10 mg) by mouth daily       See Patient Instructions    Return in about 3 months (around 9/24/2022) for Diabetes follow up, In Office or Video, Previsit labwork.    Keila Muñoz MD  [facility]   Epi is a 72 year old, presenting for the following health issues:  Diabetes and Hypertension      History of Present Illness       Diabetes:   He presents for follow up of diabetes.  He is not checking blood glucose. He has no concerns regarding his diabetes at this time.  He is not experiencing numbness or burning in feet, excessive thirst, blurry vision, weight changes or redness, sores or blisters on feet.         Hypertension: He presents for follow up of hypertension.  He does check blood pressure  regularly outside of the clinic. Outside blood pressures have been over 140/90. He does not follow a low salt diet.         Visit with patient today in follow-up of diabetes, hypertension, lipids.  Home blood pressure  readings are actually quite good and rarely over 140.  Doing well.  Reports no interval health concerns.    Had a CT scan done by his dentist about possible tooth removal and they suggested seeing ENT.  But he does not know those results.  I will have them send them to me.    Review of Systems   Constitutional, HEENT, cardiovascular, pulmonary, gi and gu systems are negative, except as otherwise noted.      Objective    Vitals - Patient Reported  Systolic (Patient Reported): 128  Diastolic (Patient Reported): 68      Vitals:  No vitals were obtained today due to virtual visit.    Physical Exam   healthy, alert and no distress  PSYCH: Alert and oriented times 3; coherent speech, normal   rate and volume, able to articulate logical thoughts, able   to abstract reason, no tangential thoughts, no hallucinations   or delusions  His affect is normal  RESP: No cough, no audible wheezing, able to talk in full sentences  Remainder of exam unable to be completed due to telephone visits    Past labs reviewed with the patient.             Phone call duration: 12 minutes    .  ..

## 2022-08-28 ENCOUNTER — HEALTH MAINTENANCE LETTER (OUTPATIENT)
Age: 72
End: 2022-08-28

## 2022-10-13 DIAGNOSIS — E11.9 TYPE 2 DIABETES MELLITUS WITHOUT COMPLICATION, WITHOUT LONG-TERM CURRENT USE OF INSULIN (H): ICD-10-CM

## 2022-10-13 RX ORDER — GLIPIZIDE 5 MG/1
5 TABLET, FILM COATED, EXTENDED RELEASE ORAL DAILY
Qty: 90 TABLET | Refills: 0 | Status: SHIPPED | OUTPATIENT
Start: 2022-10-13 | End: 2023-01-10

## 2022-10-24 ENCOUNTER — LAB (OUTPATIENT)
Dept: LAB | Facility: CLINIC | Age: 72
End: 2022-10-24
Payer: COMMERCIAL

## 2022-10-24 DIAGNOSIS — E11.9 TYPE 2 DIABETES MELLITUS WITHOUT COMPLICATION, WITHOUT LONG-TERM CURRENT USE OF INSULIN (H): ICD-10-CM

## 2022-10-24 LAB
ALBUMIN SERPL-MCNC: 4.2 G/DL (ref 3.4–5)
ALP SERPL-CCNC: 44 U/L (ref 40–150)
ALT SERPL W P-5'-P-CCNC: 36 U/L (ref 0–70)
ANION GAP SERPL CALCULATED.3IONS-SCNC: 6 MMOL/L (ref 3–14)
AST SERPL W P-5'-P-CCNC: 24 U/L (ref 0–45)
BILIRUB SERPL-MCNC: 1.3 MG/DL (ref 0.2–1.3)
BUN SERPL-MCNC: 18 MG/DL (ref 7–30)
CALCIUM SERPL-MCNC: 9.7 MG/DL (ref 8.5–10.1)
CHLORIDE BLD-SCNC: 100 MMOL/L (ref 94–109)
CHOLEST SERPL-MCNC: 157 MG/DL
CO2 SERPL-SCNC: 28 MMOL/L (ref 20–32)
CREAT SERPL-MCNC: 0.93 MG/DL (ref 0.66–1.25)
CREAT UR-MCNC: 183 MG/DL
FASTING STATUS PATIENT QL REPORTED: YES
GFR SERPL CREATININE-BSD FRML MDRD: 87 ML/MIN/1.73M2
GLUCOSE BLD-MCNC: 164 MG/DL (ref 70–99)
HBA1C MFR BLD: 7.1 % (ref 0–5.6)
HDLC SERPL-MCNC: 47 MG/DL
LDLC SERPL CALC-MCNC: 67 MG/DL
MICROALBUMIN UR-MCNC: 16 MG/L
MICROALBUMIN/CREAT UR: 8.74 MG/G CR (ref 0–17)
NONHDLC SERPL-MCNC: 110 MG/DL
POTASSIUM BLD-SCNC: 4.1 MMOL/L (ref 3.4–5.3)
PROT SERPL-MCNC: 7.4 G/DL (ref 6.8–8.8)
SODIUM SERPL-SCNC: 134 MMOL/L (ref 133–144)
TRIGL SERPL-MCNC: 215 MG/DL

## 2022-10-24 PROCEDURE — 83036 HEMOGLOBIN GLYCOSYLATED A1C: CPT

## 2022-10-24 PROCEDURE — 82043 UR ALBUMIN QUANTITATIVE: CPT

## 2022-10-24 PROCEDURE — 80053 COMPREHEN METABOLIC PANEL: CPT

## 2022-10-24 PROCEDURE — 80061 LIPID PANEL: CPT

## 2022-10-24 PROCEDURE — 36415 COLL VENOUS BLD VENIPUNCTURE: CPT

## 2022-10-24 NOTE — RESULT ENCOUNTER NOTE
Dr. Wanda Cabrera is out of the office today and I am reviewing his results/messages while he is out.  The urine protein level is normal.   The kidney, liver and electrolyte panel was normal.   The a1c is about the same as last check (goal is < 7).   The cholesterol panel is about the same as last check with continued elevation of the triglycerides.  Aerobic exercise, weight loss and moderating your carbohydrate intake (especially simple sugars) will all help to lower the triglyceride level. Aerobic exercise is the best way to increase the HDL (good cholesterol).    Please schedule your routine follow-up appointment with Dr. Muñoz to review these results in more detail.   Please MyChart or call if you have any concerns or questions.   Sincerely,  Ekaterina Pillai MD

## 2022-12-01 DIAGNOSIS — E11.9 TYPE 2 DIABETES MELLITUS WITHOUT COMPLICATION, WITHOUT LONG-TERM CURRENT USE OF INSULIN (H): ICD-10-CM

## 2022-12-01 NOTE — TELEPHONE ENCOUNTER
Patient calling to see if he should still be taking Metformin? Please advise.  Marina Stacy [facility]  2nd Floor  Primary Care

## 2022-12-01 NOTE — TELEPHONE ENCOUNTER
Well, his A1c was 7.1 - roughly what it was in Feb.  So if he think he can maintain this level w/o metformin then he shouldn't need it.  But if he feels lifestyle no where it should be I will send in a new prescription.

## 2022-12-01 NOTE — TELEPHONE ENCOUNTER
RN does not see metformin listed on patient's current medication list. Appears medication was discontinued on 6/24/2022    Routing to provider to confirm patient should not be taking metformin.    Marion Kapoor RN    [facility]

## 2022-12-02 NOTE — TELEPHONE ENCOUNTER
Called patient to clarify if patient is still taking metformin or not - patient states that he has been taking his metformin daily and isn't sure why it stated that it was discontinued in July. Patient states that he would still like to take this medication, asking for refill.    Will route to provider to review, med pended with preferred pharmacy but need provider review.        CHUCK BonnerN, RN  [facility]

## 2022-12-20 DIAGNOSIS — E78.5 HYPERLIPIDEMIA LDL GOAL <70: ICD-10-CM

## 2022-12-20 RX ORDER — ATORVASTATIN CALCIUM 10 MG/1
10 TABLET, FILM COATED ORAL DAILY
Qty: 90 TABLET | Refills: 1 | Status: SHIPPED | OUTPATIENT
Start: 2022-12-20 | End: 2023-04-14

## 2023-01-10 DIAGNOSIS — E11.9 TYPE 2 DIABETES MELLITUS WITHOUT COMPLICATION, WITHOUT LONG-TERM CURRENT USE OF INSULIN (H): ICD-10-CM

## 2023-01-10 RX ORDER — GLIPIZIDE 5 MG/1
5 TABLET, FILM COATED, EXTENDED RELEASE ORAL DAILY
Qty: 90 TABLET | Refills: 0 | Status: SHIPPED | OUTPATIENT
Start: 2023-01-10 | End: 2023-04-14

## 2023-01-18 DIAGNOSIS — I10 ESSENTIAL HYPERTENSION WITH GOAL BLOOD PRESSURE LESS THAN 140/90: ICD-10-CM

## 2023-01-18 RX ORDER — LISINOPRIL AND HYDROCHLOROTHIAZIDE 20; 25 MG/1; MG/1
TABLET ORAL
Qty: 90 TABLET | Refills: 0 | Status: SHIPPED | OUTPATIENT
Start: 2023-01-18 | End: 2023-04-14

## 2023-04-10 ENCOUNTER — TELEPHONE (OUTPATIENT)
Dept: FAMILY MEDICINE | Facility: CLINIC | Age: 73
End: 2023-04-10
Payer: COMMERCIAL

## 2023-04-10 NOTE — TELEPHONE ENCOUNTER
Pt states he is looking for an appointment with Dr. Muñoz soon.  He has three issues.    1) he is having hip pain.    2) he needs diabetic med refills    3)  He has some sinus pain and pressure.    Appointment made.  Nilda WOODSN, RN

## 2023-04-14 ENCOUNTER — ANCILLARY PROCEDURE (OUTPATIENT)
Dept: GENERAL RADIOLOGY | Facility: CLINIC | Age: 73
End: 2023-04-14
Attending: FAMILY MEDICINE
Payer: COMMERCIAL

## 2023-04-14 ENCOUNTER — OFFICE VISIT (OUTPATIENT)
Dept: FAMILY MEDICINE | Facility: CLINIC | Age: 73
End: 2023-04-14
Payer: COMMERCIAL

## 2023-04-14 VITALS
RESPIRATION RATE: 14 BRPM | DIASTOLIC BLOOD PRESSURE: 77 MMHG | OXYGEN SATURATION: 95 % | WEIGHT: 207.1 LBS | TEMPERATURE: 98.1 F | SYSTOLIC BLOOD PRESSURE: 136 MMHG | HEIGHT: 70 IN | BODY MASS INDEX: 29.65 KG/M2 | HEART RATE: 71 BPM

## 2023-04-14 DIAGNOSIS — E78.5 HYPERLIPIDEMIA LDL GOAL <70: ICD-10-CM

## 2023-04-14 DIAGNOSIS — R09.81 SINUS CONGESTION: ICD-10-CM

## 2023-04-14 DIAGNOSIS — M25.552 HIP PAIN, LEFT: ICD-10-CM

## 2023-04-14 DIAGNOSIS — E11.9 TYPE 2 DIABETES MELLITUS WITHOUT COMPLICATION, WITHOUT LONG-TERM CURRENT USE OF INSULIN (H): Primary | ICD-10-CM

## 2023-04-14 DIAGNOSIS — I10 ESSENTIAL HYPERTENSION WITH GOAL BLOOD PRESSURE LESS THAN 140/90: ICD-10-CM

## 2023-04-14 LAB — HBA1C MFR BLD: 7.6 % (ref 0–5.6)

## 2023-04-14 PROCEDURE — 83036 HEMOGLOBIN GLYCOSYLATED A1C: CPT | Performed by: FAMILY MEDICINE

## 2023-04-14 PROCEDURE — 36415 COLL VENOUS BLD VENIPUNCTURE: CPT | Performed by: FAMILY MEDICINE

## 2023-04-14 PROCEDURE — 73502 X-RAY EXAM HIP UNI 2-3 VIEWS: CPT | Mod: TC | Performed by: RADIOLOGY

## 2023-04-14 PROCEDURE — 99214 OFFICE O/P EST MOD 30 MIN: CPT | Performed by: FAMILY MEDICINE

## 2023-04-14 RX ORDER — GLIPIZIDE 5 MG/1
5 TABLET, FILM COATED, EXTENDED RELEASE ORAL DAILY
Qty: 90 TABLET | Refills: 1 | Status: SHIPPED | OUTPATIENT
Start: 2023-04-14 | End: 2023-10-09

## 2023-04-14 RX ORDER — ATORVASTATIN CALCIUM 10 MG/1
10 TABLET, FILM COATED ORAL DAILY
Qty: 90 TABLET | Refills: 0 | Status: SHIPPED | OUTPATIENT
Start: 2023-04-14 | End: 2023-09-05

## 2023-04-14 RX ORDER — LISINOPRIL AND HYDROCHLOROTHIAZIDE 20; 25 MG/1; MG/1
1 TABLET ORAL DAILY
Qty: 90 TABLET | Refills: 1 | Status: SHIPPED | OUTPATIENT
Start: 2023-04-14 | End: 2023-10-09

## 2023-04-14 RX ORDER — DOXYCYCLINE HYCLATE 100 MG
100 TABLET ORAL 2 TIMES DAILY
Qty: 14 TABLET | Refills: 0 | Status: SHIPPED | OUTPATIENT
Start: 2023-04-14 | End: 2023-04-21

## 2023-04-14 ASSESSMENT — PAIN SCALES - GENERAL: PAINLEVEL: MILD PAIN (2)

## 2023-04-14 NOTE — PROGRESS NOTES
"  Assessment & Plan     Type 2 diabetes mellitus without complication, without long-term current use of insulin (H)  Last A1c under good control.  We will recheck and adjust as needed  - glipiZIDE (GLUCOTROL XL) 5 MG 24 hr tablet; Take 1 tablet (5 mg) by mouth daily Needs follow up visit for any further refill.  - Hemoglobin A1c; Future    Essential hypertension with goal blood pressure less than 140/90  Stable on current regimen.  Continue same plan and routine follow-up.   - lisinopril-hydrochlorothiazide (ZESTORETIC) 20-25 MG tablet; Take 1 tablet by mouth daily    Hip pain, left  Has been having some left hip pain for about a year.  Mostly feels it toward the posterior aspect.  Putting pressure on it seems to be the most problematic issue.  Has a history of some low back pain years ago but this feels different and no radicular symptoms.  Range of motion pretty decent and x-ray did not reveal any significant degenerative changes.  So I think this is more of an issue of the pelvic girdle musculature -in part but not only the piriformis.  He would likely do well with a course of therapy.  - XR Hip Left 2-3 Views; Future  - Physical Therapy Referral; Future    Sinus congestion  Has had left-sided sinus congestion for a bit especially in the morning and sometimes goopy around his left eye.  Does not feel sick at all.  Once he gets up the breathing seems to be okay.  He said a dentist told him about a year ago that he had a lot of sinus congestion based on an x-ray so I like to try a quick course of antibiotics to see if this makes any change.  Might need to consider ENT referral  - doxycycline hyclate (VIBRA-TABS) 100 MG tablet; Take 1 tablet (100 mg) by mouth 2 times daily for 7 days         BMI:   Estimated body mass index is 30.14 kg/m  as calculated from the following:    Height as of this encounter: 1.765 m (5' 9.5\").    Weight as of this encounter: 93.9 kg (207 lb 1.6 oz).       See Patient " "Instructions    Keila Muñoz MD  [facility] JASPER Chowdary is a 73 year old, presenting for the following health issues:  Diabetes, Musculoskeletal Problem, and Sinus Problem        4/14/2023     3:19 PM   Additional Questions   Roomed by [place]     Musculoskeletal Problem    Sinus Problem     History of Present Illness       Reason for visit:  Sinus issues, weak hip joint, trouble walking  Symptom onset:  More than a month  Symptoms include:  Sinus on left plugged in AM, pressure on eyeball; having trouble walking, getting up/down stairs, weak left leg.  Symptom intensity:  Moderate  Symptom progression:  Worsening  Had these symptoms before:  Yes  What makes it worse:  Climbing stairs  What makes it better:  Not walking    He eats 2-3 servings of fruits and vegetables daily.He consumes 0 sweetened beverage(s) daily.He exercises with enough effort to increase his heart rate 10 to 19 minutes per day.  He exercises with enough effort to increase his heart rate 3 or less days per week.   He is taking medications regularly.         Here today in follow-up of diabetes and hypertension and wants to talk about sinus issues and hip pain as well      Review of Systems   Constitutional, HEENT, cardiovascular, pulmonary, gi and gu systems are negative, except as otherwise noted.      Objective    /77   Pulse 71   Temp 98.1  F (36.7  C) (Oral)   Resp 14   Ht 1.765 m (5' 9.5\")   Wt 93.9 kg (207 lb 1.6 oz)   SpO2 95%   BMI 30.14 kg/m    Body mass index is 30.14 kg/m .  Physical Exam   Alert, pleasant, upbeat, and in no apparent discomfort.  Ears normal. Throat and pharynx normal. Neck supple. No adenopathy or masses in the neck or supraclavicular regions. Sinuses non tender.  S1 and S2 normal, no murmurs, clicks, gallops or rubs. Regular rate and rhythm. Chest is clear; no wheezes or rales. No edema or JVD.  Decent range of motion through both hips  Past labs reviewed with the " patient.     Xray - Reviewed and interpreted by me.  Only mild degenerative changes of the left hip

## 2023-04-16 NOTE — RESULT ENCOUNTER NOTE
Epi Bliss,  A1c still under control (less than 8).  A little higher than last time - so may want to focus on diet and exercise.  But still fine.  MARIELLE Muñoz M.D.

## 2023-04-19 ENCOUNTER — OFFICE VISIT (OUTPATIENT)
Dept: OPHTHALMOLOGY | Facility: CLINIC | Age: 73
End: 2023-04-19
Payer: COMMERCIAL

## 2023-04-19 DIAGNOSIS — H25.813 COMBINED FORMS OF AGE-RELATED CATARACT OF BOTH EYES: ICD-10-CM

## 2023-04-19 DIAGNOSIS — H52.4 PRESBYOPIA: ICD-10-CM

## 2023-04-19 DIAGNOSIS — Z01.01 ENCOUNTER FOR EXAMINATION OF EYES AND VISION WITH ABNORMAL FINDINGS: ICD-10-CM

## 2023-04-19 DIAGNOSIS — L71.9 ROSACEA: ICD-10-CM

## 2023-04-19 DIAGNOSIS — E11.9 TYPE 2 DIABETES MELLITUS WITHOUT COMPLICATION, WITHOUT LONG-TERM CURRENT USE OF INSULIN (H): Primary | ICD-10-CM

## 2023-04-19 DIAGNOSIS — H43.813 POSTERIOR VITREOUS DETACHMENT, BILATERAL: ICD-10-CM

## 2023-04-19 PROCEDURE — 92015 DETERMINE REFRACTIVE STATE: CPT | Performed by: OPHTHALMOLOGY

## 2023-04-19 PROCEDURE — 92014 COMPRE OPH EXAM EST PT 1/>: CPT | Performed by: OPHTHALMOLOGY

## 2023-04-19 ASSESSMENT — REFRACTION_MANIFEST
OD_SPHERE: -6.00
OS_ADD: +2.75
OD_AXIS: 035
OS_CYLINDER: +1.75
OD_ADD: +2.75
OS_SPHERE: -5.50
OD_CYLINDER: +1.25
OS_AXIS: 115

## 2023-04-19 ASSESSMENT — VISUAL ACUITY
OD_CC: 20/40
OD_PH_CC+: -1
OS_CC: 20/40
CORRECTION_TYPE: GLASSES
METHOD: SNELLEN - LINEAR
OD_CC+: +2
OS_CC+: -1
OD_PH_CC: 20/30

## 2023-04-19 ASSESSMENT — CONF VISUAL FIELD
OD_SUPERIOR_NASAL_RESTRICTION: 0
OD_SUPERIOR_TEMPORAL_RESTRICTION: 0
OS_NORMAL: 1
OS_SUPERIOR_TEMPORAL_RESTRICTION: 0
OS_INFERIOR_NASAL_RESTRICTION: 0
OD_NORMAL: 1
OS_SUPERIOR_NASAL_RESTRICTION: 0
OD_INFERIOR_TEMPORAL_RESTRICTION: 0
OD_INFERIOR_NASAL_RESTRICTION: 0
OS_INFERIOR_TEMPORAL_RESTRICTION: 0

## 2023-04-19 ASSESSMENT — REFRACTION_WEARINGRX
OD_CYLINDER: +1.25
OD_SPHERE: -6.50
SPECS_TYPE: PAL
OD_AXIS: 066
OD_ADD: +2.75
OS_SPHERE: -5.25
OS_ADD: +2.75
OS_CYLINDER: +1.75
OS_AXIS: 110

## 2023-04-19 ASSESSMENT — CUP TO DISC RATIO
OS_RATIO: 0.3
OD_RATIO: 0.3

## 2023-04-19 ASSESSMENT — EXTERNAL EXAM - LEFT EYE: OS_EXAM: 2+ BROW PTOSIS, ROSACEA

## 2023-04-19 ASSESSMENT — TONOMETRY
IOP_METHOD: APPLANATION
OS_IOP_MMHG: 13
OD_IOP_MMHG: 14

## 2023-04-19 ASSESSMENT — EXTERNAL EXAM - RIGHT EYE: OD_EXAM: 2+ BROW PTOSIS, ROSACEA

## 2023-04-19 NOTE — PROGRESS NOTES
" Current Eye Medications:  none     Subjective:  Here for complete eye exam today. Harder to see with both eyes. No flashes or floaters.    Hemoglobin A1C   Date Value Ref Range Status   04/14/2023 7.6 (H) 0.0 - 5.6 % Final     Comment:     Normal <5.7%   Prediabetes 5.7-6.4%    Diabetes 6.5% or higher     Note: Adopted from ADA consensus guidelines.   06/22/2021 6.8 (H) 0 - 5.6 % Final     Comment:     Normal <5.7% Prediabetes 5.7-6.4%  Diabetes 6.5% or higher - adopted from ADA   consensus guidelines.       Okay with night driving.  Mostly satisfied with current visual status.     Objective:  See Ophthalmology Exam.       Assessment:  Cataract slightly worse right eye; otherwise stable eye exam.  No diabetic retinopathy.      ICD-10-CM    1. Type 2 diabetes mellitus without complication, without long-term current use of insulin (H)  E11.9 EYE EXAM (SIMPLE-NONBILLABLE)      2. Combined forms of age-related cataract, mild-mod, of both eyes  H25.813       3. Posterior vitreous detachment, bilateral  H43.813       4. Rosacea  L71.9       5. Encounter for examination of eyes and vision with abnormal findings  Z01.01       6. Presbyopia  H52.4 REFRACTIVE STATUS           Plan:  Glasses prescription given - optional   Cataract surgery is an option at any time.  May use artificial tears up to four times a day (like Refresh Optive, Systane Balance, TheraTears, or generic artificial tears are ok. Avoid \"get the red out\" drops).   Call in December 2023 for an appointment in April 2024 for Complete Exam    Dr. Bedolla [phone]      "

## 2023-04-19 NOTE — LETTER
"    4/19/2023         RE: Rick Sandhu  [address] [address] [facility] [address]        Dear Colleague,    Thank you for referring your patient, Rick Sandhu, to the [facility]. Please see a copy of my visit note below.     Current Eye Medications:  none     Subjective:  Here for complete eye exam today. Harder to see with both eyes. No flashes or floaters.    Hemoglobin A1C   Date Value Ref Range Status   04/14/2023 7.6 (H) 0.0 - 5.6 % Final     Comment:     Normal <5.7%   Prediabetes 5.7-6.4%    Diabetes 6.5% or higher     Note: Adopted from ADA consensus guidelines.   06/22/2021 6.8 (H) 0 - 5.6 % Final     Comment:     Normal <5.7% Prediabetes 5.7-6.4%  Diabetes 6.5% or higher - adopted from ADA   consensus guidelines.       Okay with night driving.  Mostly satisfied with current visual status.     Objective:  See Ophthalmology Exam.       Assessment:  Cataract slightly worse right eye; otherwise stable eye exam.  No diabetic retinopathy.      ICD-10-CM    1. Type 2 diabetes mellitus without complication, without long-term current use of insulin (H)  E11.9 EYE EXAM (SIMPLE-NONBILLABLE)      2. Combined forms of age-related cataract, mild-mod, of both eyes  H25.813       3. Posterior vitreous detachment, bilateral  H43.813       4. Rosacea  L71.9       5. Encounter for examination of eyes and vision with abnormal findings  Z01.01       6. Presbyopia  H52.4 REFRACTIVE STATUS           Plan:  Glasses prescription given - optional   Cataract surgery is an option at any time.  May use artificial tears up to four times a day (like Refresh Optive, Systane Balance, TheraTears, or generic artificial tears are ok. Avoid \"get the red out\" drops).   Call in December 2023 for an appointment in April 2024 for Complete Exam    Dr. Bedolla [phone]          Again, thank you for allowing me to participate in the care of your patient.        Sincerely,        Man Bedolla MD    "

## 2023-04-19 NOTE — PATIENT INSTRUCTIONS
"Glasses prescription given - optional  May use artificial tears up to four times a day (like Refresh Optive, Systane Balance, TheraTears, or generic artificial tears are ok. Avoid \"get the red out\" drops).   Call in December 2023 for an appointment in April 2024 for Complete Exam    Dr. Bedolla [phone]     Patient Education   Diabetes weakens the blood vessels all over the body, including the eyes. Damage to the blood vessels in the eyes can cause swelling or bleeding into part of the eye (called the retina). This is called diabetic retinopathy (MEGAN-tin--puh-thee). If not treated, this disease can cause vision loss or blindness.   Symptoms may include blurred or distorted vision, but many people have no symptoms. It's important to see your eye doctor regularly to check for problems.   Early treatment and good control can help protect your vision. Here are the things you can do to help prevent vision loss:      1. Keep your blood sugar levels under tight control.      2. Bring high blood pressure under control.      3. No smoking.      4. Have yearly dilated eye exams.        "

## 2023-04-20 ENCOUNTER — THERAPY VISIT (OUTPATIENT)
Dept: PHYSICAL THERAPY | Facility: CLINIC | Age: 73
End: 2023-04-20
Attending: FAMILY MEDICINE
Payer: COMMERCIAL

## 2023-04-20 DIAGNOSIS — M79.18 PAIN IN LEFT BUTTOCK: ICD-10-CM

## 2023-04-20 DIAGNOSIS — M25.552 HIP PAIN, LEFT: ICD-10-CM

## 2023-04-20 PROCEDURE — 97161 PT EVAL LOW COMPLEX 20 MIN: CPT | Mod: GP | Performed by: PHYSICAL THERAPIST

## 2023-04-20 PROCEDURE — 97530 THERAPEUTIC ACTIVITIES: CPT | Mod: GP | Performed by: PHYSICAL THERAPIST

## 2023-04-20 PROCEDURE — 97110 THERAPEUTIC EXERCISES: CPT | Mod: GP | Performed by: PHYSICAL THERAPIST

## 2023-04-20 ASSESSMENT — ACTIVITIES OF DAILY LIVING (ADL)
ROLLING_OVER_IN_BED: SLIGHT DIFFICULTY
WALKING_UP_STEEP_HILLS: MODERATE DIFFICULTY
TWISTING/PIVOTING_ON_INVOLVED_LEG: EXTREME DIFFICULTY
HOS_ADL_SCORE(%): 57.81
HOS_ADL_ITEM_SCORE_TOTAL: 37
GOING_DOWN_1_FLIGHT_OF_STAIRS: MODERATE DIFFICULTY
WALKING_DOWN_STEEP_HILLS: SLIGHT DIFFICULTY
PUTTING_ON_SOCKS_AND_SHOES: MODERATE DIFFICULTY
HOS_ADL_HIGHEST_POTENTIAL_SCORE: 64
DEEP_SQUATTING: SLIGHT DIFFICULTY
HOS_ADL_COUNT: 16
WALKING_INITIALLY: MODERATE DIFFICULTY
GOING_UP_1_FLIGHT_OF_STAIRS: MODERATE DIFFICULTY
SITTING_FOR_15_MINUTES: SLIGHT DIFFICULTY
WALKING_APPROXIMATELY_10_MINUTES: SLIGHT DIFFICULTY
HEAVY_WORK: MODERATE DIFFICULTY
LIGHT_TO_MODERATE_WORK: MODERATE DIFFICULTY
RECREATIONAL_ACTIVITIES: MODERATE DIFFICULTY
HOW_WOULD_YOU_RATE_YOUR_CURRENT_LEVEL_OF_FUNCTION_DURING_YOUR_USUAL_ACTIVITIES_OF_DAILY_LIVING_FROM_0_TO_100_WITH_100_BEING_YOUR_LEVEL_OF_FUNCTION_PRIOR_TO_YOUR_HIP_PROBLEM_AND_0_BEING_THE_INABILITY_TO_PERFORM_ANY_OF_YOUR_USUAL_DAILY_ACTIVITIES?: 65
WALKING_15_MINUTES_OR_GREATER: MODERATE DIFFICULTY
STEPPING_UP_AND_DOWN_CURBS: MODERATE DIFFICULTY
STANDING_FOR_15_MINUTES: NO DIFFICULTY AT ALL
GETTING_INTO_AND_OUT_OF_AN_AVERAGE_CAR: MODERATE DIFFICULTY

## 2023-04-20 NOTE — PROGRESS NOTES
[facility]    OUTPATIENT Physical Therapy ORTHOPEDIC EVALUATION  PLAN OF TREATMENT FOR OUTPATIENT REHABILITATION  (COMPLETE FOR INITIAL CLAIMS ONLY)  Patient's Last Name, First Name, M.I.  YOB: 1950  Rick Sandhu       Provider s Name:  [facility]   Medical Record No.  [id number]   Start of Care Date:  04/20/23   Onset Date:   04/14/23 (MD order date)   Treatment Diagnosis:  L hip pain Medical Diagnosis:  Hip pain, left       Goals:     04/20/23 0500   Body Part   Goals listed below are for L hip pain   Goal #1   Goal #1 self cares/transfers/bed mobility   Previous Functional Level No restrictions   Performance Level immediate sit to stand 6/10PL   STG Target Performance Be able to ; transfer in and out of a chair   Performance Level 3/10PL with immediate standing up from chair   Date Goal Met 05/20/23   LTG Target Performance Be able to ; transfer in and out of a chair   Performance level immediate sit to stand no pain   Rationale independence in self cares;for independent community transportation;for independent living   Due Date 07/20/23         Therapy Frequency:  1x/week x1 month; reduce to 2x/month x2 more months  Predicted Duration of Therapy Intervention:  x3 months    Simin Benitez, PT                 I CERTIFY THE NEED FOR THESE SERVICES FURNISHED UNDER        THIS PLAN OF TREATMENT AND WHILE UNDER MY CARE     (Physician attestation of this document indicates review and certification of the therapy plan).                     Certification Date From:  04/20/23   Certification Date To:  07/20/23    Referring Provider:  Keila Muñoz MD    Initial Assessment        See Epic Evaluation SOC Date: 04/20/23

## 2023-04-20 NOTE — PROGRESS NOTES
Physical Therapy Initial Evaluation  Subjective:    Patient Health History  Rick Sandhu being seen for Pain in left hip.     Problem began: 4/14/2023 (MD order).   Problem occurred: Came on gradually over last year, worse last month or two.   Pain is reported as 2/10 on pain scale.  General health as reported by patient is good.  Pertinent medical history includes: none.   Red flags:  Pain at rest/night.  Medical allergies: other. Other medical allergies details: Penicillin G.   Surgeries include:  Other. Other surgery history details: vasectomy in 1982, appendectomy approx. 1958..    Current medications:  High blood pressure medication and other. Other medications details: metforman, glipizide, lisinopril, atorvastatin.    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   Primary job tasks include:  Computer work, driving and prolonged sitting.                  Therapist Generated HPI Evaluation  Problem details: Patient presents to PT today with c/o ongoing L side buttock/hip pain;  Started ~ 1 year ago and has progressively worsened.    Patient did slip 1-2x this past winter.  .         Type of problem:  Lumbar (L buttock).    This is a chronic condition.  Condition occurred with:  Insidious onset.  Where condition occurred: for unknown reasons.  Patient reports pain:  SI joint left and other (L buttock/back of L hip).  Pain is described as aching and burning and is constant.  Pain radiates to:  Gluteals left and thigh left (very intermittent and usually with sitting). Pain is the same all the time.    Associated symptoms:  Loss of motion/stiffness, loss of strength, numbness and tingling. Symptoms are exacerbated by standing, walking, lying down and other (walking on TM and with stairs)  and relieved by rest and activity/movement.  Special tests included:  X-ray (of L hip).    Barriers include:  None as reported by patient.    Therapist Generated HPI Evaluation         Type of problem:   Left hip.    This is a chronic condition.  Condition occurred with:  Insidious onset.  Where condition occurred: for unknown reasons.  Patient reports pain:  Posterior.  Pain is described as aching and is constant.  Pain radiates to:  Gluteals and thigh. Pain is the same all the time.  Since onset symptoms are unchanged.  Associated symptoms:  Loss of motion/stiffness. Symptoms are exacerbated by transfers, ascending stairs, descending stairs, standing and walking  and relieved by rest and activity/movement.  Special tests included:  X-ray (Normal).    Barriers include:  None as reported by patient.                        Objective:    Gait:    Gait Type:  Antalgic   Weight Bearing Status:  WBAT   Assistive Devices:  None  Deviations:  Lumbar:  Trunk flexion and trunk lean RHip:  Hip hiking L and Trendelenberg L               Lumbar/SI Evaluation  ROM:    AROM Lumbar:   Flexion:        Lower leg; pulling buttock/hamstring  Ext:                    Mod loss; stiffness   Side Bend:        Left:     Right:   Rotation:           Left:     Right:   Side Glide:        Left:     Right:                                                               Hip Evaluation  HIP AROM:  AROM:    Left Hip:     Normal    Right Hip:   Normal                    Hip Strength:    Flexion:   Left: 4-/5   Pain:  Right: 4/5   Pain:                    Extension:  Left: 4/5  Pain:Right: 4/5    Pain:    Abduction:  Left: 4/5    -   Pain:Right: 5/5    Pain:  Adduction:  Left: 4/5    Pain:Right: 4/5   Pain:      Knee Flexion:  Left: 5/5   Pain:Right: 5/5   Pain:  Knee Extension:  Left: 5/5   Pain:Right: 5/5    Pain:        Hip Special Testing:    Left hip positive for the following special tests:  Fede  Left hip negative for the following special tests:  SLR or [facility]      Hip Palpation:    Left hip tenderness present at:   IT Band; Piriformis and Gluteus Medius               General     ROS    Assessment/Plan:    Patient is a 73 year old male with  left side hip complaints.    Patient has the following significant findings with corresponding treatment plan.                Diagnosis 1:  L hip pain  Pain -  hot/cold therapy, US, electric stimulation, mechanical traction and manual therapy  Decreased ROM/flexibility - manual therapy and therapeutic exercise  Decreased strength - therapeutic exercise and therapeutic activities  Impaired muscle performance - neuro re-education  Decreased function - therapeutic activities    Therapy Evaluation Codes:   1) History comprised of:   Personal factors that impact the plan of care:      Past/current experiences.    Comorbidity factors that impact the plan of care are:      None.     Medications impacting care: High blood pressure and Metformin, glipizide, lisinopril atrovastatin.  2) Examination of Body Systems comprised of:   Body structures and functions that impact the plan of care:      Hip.   Activity limitations that impact the plan of care are:      Bending, Sitting, Squatting/kneeling, Stairs, Standing, Walking, Sleeping and transfers.  3) Clinical presentation characteristics are:   Stable/Uncomplicated.  4) Decision-Making    Low complexity using standardized patient assessment instrument and/or measureable assessment of functional outcome.  Cumulative Therapy Evaluation is: Low complexity.    Previous and current functional limitations:  (See Goal Flow Sheet for this information)    Short term and Long term goals: (See Goal Flow Sheet for this information)     Communication ability:  Patient appears to be able to clearly communicate and understand verbal and written communication and follow directions correctly.  Treatment Explanation - The following has been discussed with the patient:   RX ordered/plan of care  Anticipated outcomes  Possible risks and side effects  This patient would benefit from PT intervention to resume normal activities.   Rehab potential is good.    Frequency:  1 X week, once daily x1  month; reduce to 2x/month x2 months  Duration:  for 3 months  Discharge Plan:  Achieve all LTG.  Independent in home treatment program.  Reach maximal therapeutic benefit.    Please refer to the daily flowsheet for treatment today, total treatment time and time spent performing 1:1 timed codes.

## 2023-04-23 ENCOUNTER — HEALTH MAINTENANCE LETTER (OUTPATIENT)
Age: 73
End: 2023-04-23

## 2023-05-01 ENCOUNTER — THERAPY VISIT (OUTPATIENT)
Dept: PHYSICAL THERAPY | Facility: CLINIC | Age: 73
End: 2023-05-01
Attending: FAMILY MEDICINE
Payer: COMMERCIAL

## 2023-05-01 DIAGNOSIS — M79.18 PAIN IN LEFT BUTTOCK: ICD-10-CM

## 2023-05-01 DIAGNOSIS — M25.552 HIP PAIN, LEFT: Primary | ICD-10-CM

## 2023-05-01 PROCEDURE — 97110 THERAPEUTIC EXERCISES: CPT | Mod: GP | Performed by: PHYSICAL THERAPIST

## 2023-05-01 PROCEDURE — 97112 NEUROMUSCULAR REEDUCATION: CPT | Mod: GP | Performed by: PHYSICAL THERAPIST

## 2023-05-01 PROCEDURE — 97140 MANUAL THERAPY 1/> REGIONS: CPT | Mod: GP | Performed by: PHYSICAL THERAPIST

## 2023-05-08 ENCOUNTER — THERAPY VISIT (OUTPATIENT)
Dept: PHYSICAL THERAPY | Facility: CLINIC | Age: 73
End: 2023-05-08
Attending: FAMILY MEDICINE
Payer: COMMERCIAL

## 2023-05-08 DIAGNOSIS — M79.18 PAIN IN LEFT BUTTOCK: ICD-10-CM

## 2023-05-08 DIAGNOSIS — M25.552 HIP PAIN, LEFT: Primary | ICD-10-CM

## 2023-05-08 PROCEDURE — 97112 NEUROMUSCULAR REEDUCATION: CPT | Mod: GP | Performed by: PHYSICAL THERAPIST

## 2023-05-08 PROCEDURE — 97110 THERAPEUTIC EXERCISES: CPT | Mod: GP | Performed by: PHYSICAL THERAPIST

## 2023-05-18 ENCOUNTER — TELEPHONE (OUTPATIENT)
Dept: FAMILY MEDICINE | Facility: CLINIC | Age: 73
End: 2023-05-18
Payer: COMMERCIAL

## 2023-05-18 NOTE — TELEPHONE ENCOUNTER
Patient Quality Outreach    Patient is due for the following:   Physical Annual Wellness Visit      Topic Date Due     Zoster (Shingles) Vaccine (1 of 2) Never done     Diptheria Tetanus Pertussis (DTAP/TDAP/TD) Vaccine (1 - Tdap) 02/23/2017     Pneumococcal Vaccine (3 - PPSV23 if available, else PCV20) 02/22/2018     COVID-19 Vaccine (4 - Pfizer series) 02/07/2022     Flu Vaccine (1) 09/01/2022       Next Steps:   Schedule a Annual Wellness Visit    Type of outreach:    Sent [employer] message.      Questions for provider review:    None           Teresita Crespo MA

## 2023-05-31 ENCOUNTER — THERAPY VISIT (OUTPATIENT)
Dept: PHYSICAL THERAPY | Facility: CLINIC | Age: 73
End: 2023-05-31
Payer: COMMERCIAL

## 2023-05-31 DIAGNOSIS — M25.552 HIP PAIN, LEFT: Primary | ICD-10-CM

## 2023-05-31 DIAGNOSIS — M79.18 PAIN IN LEFT BUTTOCK: ICD-10-CM

## 2023-05-31 PROCEDURE — 97112 NEUROMUSCULAR REEDUCATION: CPT | Mod: GP | Performed by: PHYSICAL THERAPIST

## 2023-05-31 PROCEDURE — 97140 MANUAL THERAPY 1/> REGIONS: CPT | Mod: GP | Performed by: PHYSICAL THERAPIST

## 2023-05-31 PROCEDURE — 97110 THERAPEUTIC EXERCISES: CPT | Mod: GP | Performed by: PHYSICAL THERAPIST

## 2023-06-13 ENCOUNTER — THERAPY VISIT (OUTPATIENT)
Dept: PHYSICAL THERAPY | Facility: CLINIC | Age: 73
End: 2023-06-13
Payer: COMMERCIAL

## 2023-06-13 DIAGNOSIS — M25.552 HIP PAIN, LEFT: Primary | ICD-10-CM

## 2023-06-13 DIAGNOSIS — M79.18 PAIN IN LEFT BUTTOCK: ICD-10-CM

## 2023-06-13 PROCEDURE — 97110 THERAPEUTIC EXERCISES: CPT | Mod: GP | Performed by: PHYSICAL THERAPIST

## 2023-06-13 ASSESSMENT — ACTIVITIES OF DAILY LIVING (ADL)
WALKING_APPROXIMATELY_10_MINUTES: NO DIFFICULTY AT ALL
LIGHT_TO_MODERATE_WORK: NO DIFFICULTY AT ALL
GOING_DOWN_1_FLIGHT_OF_STAIRS: NO DIFFICULTY AT ALL
GOING_UP_1_FLIGHT_OF_STAIRS: MODERATE DIFFICULTY
STEPPING_UP_AND_DOWN_CURBS: NO DIFFICULTY AT ALL
DEEP_SQUATTING: NO DIFFICULTY AT ALL
PUTTING_ON_SOCKS_AND_SHOES: NO DIFFICULTY AT ALL
HOS_ADL_ITEM_SCORE_TOTAL: 51
ROLLING_OVER_IN_BED: NO DIFFICULTY AT ALL
WALKING_INITIALLY: SLIGHT DIFFICULTY
GETTING_INTO_AND_OUT_OF_A_BATHTUB: NO DIFFICULTY AT ALL
GETTING_INTO_AND_OUT_OF_AN_AVERAGE_CAR: SLIGHT DIFFICULTY
RECREATIONAL_ACTIVITIES: SLIGHT DIFFICULTY
HOS_ADL_COUNT: 14
STANDING_FOR_15_MINUTES: NO DIFFICULTY AT ALL
SITTING_FOR_15_MINUTES: NO DIFFICULTY AT ALL
HEAVY_WORK: NO DIFFICULTY AT ALL
TWISTING/PIVOTING_ON_INVOLVED_LEG: NO DIFFICULTY AT ALL
HOS_ADL_HIGHEST_POTENTIAL_SCORE: 56
HOS_ADL_SCORE(%): 91.07

## 2023-06-13 NOTE — PROGRESS NOTES
DISCHARGE  Reason for Discharge: Patient has met all goals.  Patient chooses to discontinue therapy.    Equipment Issued: HEP with Theraband    Discharge Plan: Patient to continue home program.    Referring Provider:  Keila Muñoz MD     06/13/23 0500   Appointment Info   Signing clinician's name / credentials MELANIE Ashley   Total/Authorized Visits 6   Visits Used 5   Medical Diagnosis Hip pain, left   PT Tx Diagnosis L hip pain   Quick Adds Certification   Progress Note/Certification   Start of Care Date 04/20/23   Onset of illness/injury or Date of Surgery 04/14/23   Therapy Frequency 1x/week x1 month; reduce to 2x/month x2 more months   Predicted Duration x3 months   Certification date from 04/20/23   Certification date to 07/20/23   Progress Note Completed Date 04/20/23   GOALS   PT Goals 2   PT Goal 1   Goal Identifier self cares/transfers/bed mobility   Goal Description Be able to ; transfer in and out of a chair;3/10PL with immediate standing up from chair   Rationale to maximize safety and independence with performance of ADLs and functional tasks;to maximize safety and independence within the home;to maximize safety and independence within the community   Goal Progress 05/20/23   Target Date 05/08/23   PT Goal 2   Goal Description Be able to ; transfer in and out of a chair; immediate sit to stand no pain   Rationale to maximize safety and independence with performance of ADLs and functional tasks;to maximize safety and independence within the home;to maximize safety and independence within the community;to maximize safety and independence with self cares   Goal Progress reports stiffness vs pain after 10-15 min sitting   Target Date 07/20/23   Subjective Report   Subjective Report pt feels the hip is better; pt able to drive to and from [place] without issues in LB and hip.  pt is hoping to get back in to his gym routine of 2-3x/week.   Objective Measures   Objective Measures  "Objective Measure 1;Objective Measure 2;Objective Measure 3   Objective Measure 1   Objective Measure LE strength   Details HIp flexion 4+/5, knee ext/flex 5/5.   Objective Measure 2   Objective Measure Hip PROM   Details WNL; no pain   Objective Measure 3   Objective Measure TROM   Details Flex= touches toes, with slight pull, ext= mod loss, SB= WNL.   Treatment Interventions (PT)   Interventions Therapeutic Procedure/Exercise;Manual Therapy;Neuromuscular Re-education   Therapeutic Procedure/Exercise   Therapeutic Procedures: strength, endurance, ROM, flexibillity minutes (65819) 28   PTRx Ther Proc 1 Neutral Side-lying   PTRx Ther Proc 1 - Details HEP   PTRx Ther Proc 2 Supine Butterfly   PTRx Ther Proc 2 - Details Reviewed   PTRx Ther Proc 3 Piriformis Stretch Below 90 Degrees Supine   PTRx Ther Proc 3 - Details 3x20\" hold; David   PTRx Ther Proc 5 Seated Hamstring Stretch   PTRx Ther Proc 5 - Details 3x15\" David; cued to keep eyes looking forward instead of down.    PTRx Ther Proc 6 Double Knee to Chest   PTRx Ther Proc 6 - Details 5x10\" hold   PTRx Ther Proc 7 Clamshell Feet together   PTRx Ther Proc 7 - Details x10; 3 sec hold; david   PTRx Ther Proc 8 Sit to Stand   PTRx Ther Proc 8 - Details x10; from low plinth; no use of hands   Skilled Intervention pt education on correct technique   Patient Response/Progress tolerated exercises well; good return on exercises   Therapeutic Activity   PTRx Ther Act 1 Neutral Spine Standing   PTRx Ther Act 1 - Details HEP   PTRx Ther Act 2 Posture Correction with Lumbar Roll   PTRx Ther Act 2 - Details reviewed education on proper sitting posture   Neuromuscular Re-education   Neuromuscular re-ed of mvmt, balance, coord, kinesthetic sense, posture, proprioception minutes (17770) 2   PTRx Neuro Re-ed 2 Side Stepping With Theraband   PTRx Neuro Re-ed 2 - Details x2'; Green TheraBand   Skilled Intervention education on proper exercise technique   Patient Response/Progress good " return on exercises   Manual Therapy   Manual Therapy 1 Lumbar PA glides/  SI joint mobs   Manual Therapy 1 - Details x10'   Skilled Intervention pain management   Patient Response/Progress decrease in pain   Education   Learner/Method Patient;Pictures/Video   Plan   Home program pt to continue with HEP as tolerated   Plan for next session D/C to HEP   Total Session Time   Timed Code Treatment Minutes 30   Total Treatment Time (sum of timed and untimed services) 30

## 2023-06-21 ENCOUNTER — TELEPHONE (OUTPATIENT)
Dept: FAMILY MEDICINE | Facility: CLINIC | Age: 73
End: 2023-06-21

## 2023-06-21 ENCOUNTER — OFFICE VISIT (OUTPATIENT)
Dept: URGENT CARE | Facility: URGENT CARE | Age: 73
End: 2023-06-21
Payer: COMMERCIAL

## 2023-06-21 VITALS
WEIGHT: 205.1 LBS | HEART RATE: 76 BPM | RESPIRATION RATE: 16 BRPM | OXYGEN SATURATION: 97 % | DIASTOLIC BLOOD PRESSURE: 79 MMHG | TEMPERATURE: 96.7 F | SYSTOLIC BLOOD PRESSURE: 158 MMHG | BODY MASS INDEX: 29.85 KG/M2

## 2023-06-21 DIAGNOSIS — M54.6 ACUTE LEFT-SIDED THORACIC BACK PAIN: Primary | ICD-10-CM

## 2023-06-21 DIAGNOSIS — K86.89 PANCREATIC MASS: Primary | ICD-10-CM

## 2023-06-21 PROCEDURE — 96372 THER/PROPH/DIAG INJ SC/IM: CPT | Performed by: STUDENT IN AN ORGANIZED HEALTH CARE EDUCATION/TRAINING PROGRAM

## 2023-06-21 PROCEDURE — 99213 OFFICE O/P EST LOW 20 MIN: CPT | Mod: 25 | Performed by: STUDENT IN AN ORGANIZED HEALTH CARE EDUCATION/TRAINING PROGRAM

## 2023-06-21 RX ORDER — KETOROLAC TROMETHAMINE 30 MG/ML
30 INJECTION, SOLUTION INTRAMUSCULAR; INTRAVENOUS ONCE
Status: COMPLETED | OUTPATIENT
Start: 2023-06-21 | End: 2023-06-21

## 2023-06-21 RX ADMIN — KETOROLAC TROMETHAMINE 30 MG: 30 INJECTION, SOLUTION INTRAMUSCULAR; INTRAVENOUS at 12:44

## 2023-06-21 ASSESSMENT — PAIN SCALES - GENERAL: PAINLEVEL: EXTREME PAIN (9)

## 2023-06-21 NOTE — PROGRESS NOTES
"ASSESSMENT & PLAN:   Diagnoses and all orders for this visit:  Acute left-sided thoracic back pain  -     ketorolac (TORADOL) injection 30 mg    Acute onset left lower thoracic back pain x1 hour, atraumatic. Possible kidney stone. Due to severe pain and inability to urinate, recommend ER for advanced imaging. Toradol given in clinic for pain.    No follow-ups on file.    Patient Instructions   Go to ER.    [facility] [place]      At the end of the encounter, I discussed results, diagnosis, medications. Discussed red flags for immediate return to clinic/ER, as well as indications for follow up if no improvement. Patient and/or caregiver understood and agreed to plan. Patient was stable for discharge.    ------------------------------------------------------------------------  SUBJECTIVE  Patient presents with:  Flank Pain: Patient reporting severe left sided flank pain that began one hour ago - patient urinating less than \"half a teaspoon\" at a time. Patient has history of kidney stone 20 years ago. Patient has been going to physical therapy for the last month for pain in that area thinking it has been a \"pulled muscle.\"    HPI  Rick Sandhu is a(n) 73 year old male presenting to urgent care for severe left lower back pain x1 hour. No injury. He also reports urge to urinate but decreased output. History of kidney stone 20 years ago. No hematuria. Currently doing physical therapy for left hip pain, but this pain feels different.    Review of Systems    Current Outpatient Medications   Medication Sig Dispense Refill     ASPIRIN 81 MG PO TABS 1 TABLET DAILY (*)       atorvastatin (LIPITOR) 10 MG tablet Take 1 tablet (10 mg) by mouth daily 90 tablet 0     glipiZIDE (GLUCOTROL XL) 5 MG 24 hr tablet Take 1 tablet (5 mg) by mouth daily Needs follow up visit for any further refill. 90 tablet 1     lisinopril-hydrochlorothiazide (ZESTORETIC) 20-25 MG tablet Take 1 tablet by " mouth daily 90 tablet 1     metFORMIN (GLUCOPHAGE) 500 MG tablet Take 1 tablet (500 mg) by mouth 2 times daily (with meals) 180 tablet 0     vitamin D3 (CHOLECALCIFEROL) 50 mcg (2000 units) tablet        Zinc 50 MG CAPS        Problem List:  2023-04: Hip pain, left  2023-04: Pain in left buttock  2018-03: Combined forms of age-related cataract, mild-mod, of both   eyes  2017-03: Cataract, mild, ou  2017-02: Type 2 diabetes mellitus without complication, without long-  term current use of insulin (H)  2016-10: Chronic left shoulder pain  2016-08: Essential hypertension with goal blood pressure less than   140/90  2016-02: Posterior vitreous detachment, bilateral  2012-01: Posterior vitreous detachment, ou  2011-09: Advanced directives, counseling/discussion  2011-01: Diabetes mellitus, type 2 (H)  2011-01: Rosacea  2011-01: POSTERIOR VITREOUS DETACHMENT, ou  2010-10: Hyperlipidemia LDL goal <100  1996-02: Sarcoidosis    Allergies   Allergen Reactions     Penicillin G          OBJECTIVE  Vitals:    06/21/23 1226   BP: (!) 158/79   BP Location: Left arm   Patient Position: Sitting   Cuff Size: Adult Regular   Pulse: 76   Resp: 16   Temp: (!) 96.7  F (35.9  C)   TempSrc: Tympanic   SpO2: 97%   Weight: 93 kg (205 lb 1.6 oz)     Physical Exam   GENERAL: healthy, alert, no acute distress.   MSK: no bony tenderness of thoracic or lumbar spinous process, SI joint, iliac crest bilaterally. Tender to palpation of left lower thoracic and lumbar back.    No results found for any visits on 06/21/23.

## 2023-06-22 NOTE — TELEPHONE ENCOUNTER
Pt went to [facility] for possible kidney stones. He was then told to go to ER for additional imaging.  And work up. Below is the result from the CT in [facility]. He said he was told to get moving on this and not to wait.      suggestion  Information displayed in this report may not trend or trigger automated decision support.     CT ABDOMEN & PELVIS W/O ORAL W/O IV CON  Order: [id number]  Impression    IMPRESSION:   1.  2 mm obstructing stone at the left ureterovesical junction nearly passed into the urinary bladder with mild secondary hydronephrosis. 6 mm nonobstructing right kidney stone.   2.  3.7 x 2.8 cm mass noted within the body of the pancreas. This is suspicious for malignancy. Recommend follow-up contrast enhanced CT scanning of the abdomen and pelvis more detailed evaluation.   3.  Diverticulosis.   4.  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     INCIDENTAL FINDING: This report includes a nonurgent finding for which imaging, clinical, or laboratory study follow-up is recommended.     REPORT SIGNED BY MIRIAM GREWAL M.D.  Narrative    EXAM: CT ABDOMEN & PELVIS W/O ORAL W/O IV CON     DATE: 6/21/2023 3:09 PM     CLINICAL DATA: Left-sided flank and back pain. History of kidney stones.     COMPARISON: None.     TECHNIQUE: Unenhanced contiguous transaxial images images were obtained through the abdomen and pelvis.  Coronal reformations were also obtained through the abdomen and pelvis.     FINDINGS:     Kidneys: There is a 2 mm obstructing stone seen at the left ureterovesical junction nearly passed into the urinary bladder. There is mild secondary hydroureter and hydronephrosis. There is an additional 6 mm nonobstructing stone seen within a lower pole calyx of the right kidney. No right-sided hydronephrosis or hydroureter.     Bladder: Collapsed. 2 mm left ureterovesical junction stone noted. No other calcifications seen.     Retroperitoneum: Scattered  atherosclerotic calcification of aorta without aneurysm. No retroperitoneal lymphadenopathy.     Other findings: There is a 3.7 x 2.8 cm mass seen within the body of the pancreas. The noncontrast appearance of the liver, spleen and adrenal glands is unremarkable. Gallbladder appears unremarkable.     There are no abnormally dilated or thickened loops of bowel. Colonic diverticulosis without acute diverticulitis. Enlarged prostate gland. Postoperative changes along the right ventral abdominal wall. No ascites.     Punctate calcified granuloma seen in the right lower lobe in the posterior costophrenic sulcus. Mild chronic atelectasis versus scarring in the right lower lobe with some elevation of the right diaphragm.     No lytic or destructive osseous lesions. There is bilateral L5 spondylolysis with grade 1 spondylolisthesis of L5 on S1.  Exam End: 06/21/23  3:14 PM    Specimen Collected: 06/21/23  3:28 PM Last Resulted: 06/21/23  3:40 PM   Received From: [facility]  Result Received: 06/22/23  5:08 PM

## 2023-06-22 NOTE — TELEPHONE ENCOUNTER
Where was this done?  Does not seem to be in his chart, so we need to update Care Everywhere or something.  I cannot give any advice w/o seeing the report.

## 2023-06-22 NOTE — TELEPHONE ENCOUNTER
Patient Returning Call    Reason for call:   Xray spotted a 3.7 x 2.8 cm mass w/in body of pancreas  Information relayed to patient:  Yes     Patient has additional questions:  Yes    What are your questions/concerns:  Followup     [facility] or would you prefer to receive a phone call?:   Patient would prefer a phone call   Okay to leave a detailed message?: Yes at Home number on file [phone] (home)

## 2023-06-23 ENCOUNTER — MYC MEDICAL ADVICE (OUTPATIENT)
Dept: FAMILY MEDICINE | Facility: CLINIC | Age: 73
End: 2023-06-23
Payer: COMMERCIAL

## 2023-06-23 NOTE — TELEPHONE ENCOUNTER
Order placed.  I notified patient with a [employer] message  Please set him up with an appointment with me after (so probably couple weeks from now) - in person preferable, but video if needed.  OK to use same day.

## 2023-06-27 ENCOUNTER — ANCILLARY PROCEDURE (OUTPATIENT)
Dept: CT IMAGING | Facility: CLINIC | Age: 73
End: 2023-06-27
Attending: FAMILY MEDICINE
Payer: COMMERCIAL

## 2023-06-27 ENCOUNTER — TELEPHONE (OUTPATIENT)
Dept: GASTROENTEROLOGY | Facility: CLINIC | Age: 73
End: 2023-06-27

## 2023-06-27 ENCOUNTER — DOCUMENTATION ONLY (OUTPATIENT)
Dept: GASTROENTEROLOGY | Facility: CLINIC | Age: 73
End: 2023-06-27

## 2023-06-27 DIAGNOSIS — K86.89 PANCREATIC MASS: ICD-10-CM

## 2023-06-27 DIAGNOSIS — K86.89 PANCREATIC MASS: Primary | ICD-10-CM

## 2023-06-27 LAB
CREAT BLD-MCNC: 1 MG/DL (ref 0.7–1.3)
GFR SERPL CREATININE-BSD FRML MDRD: >60 ML/MIN/1.73M2

## 2023-06-27 PROCEDURE — 82565 ASSAY OF CREATININE: CPT

## 2023-06-27 PROCEDURE — 74177 CT ABD & PELVIS W/CONTRAST: CPT | Mod: GC | Performed by: RADIOLOGY

## 2023-06-27 RX ORDER — IOPAMIDOL 755 MG/ML
113 INJECTION, SOLUTION INTRAVASCULAR ONCE
Status: COMPLETED | OUTPATIENT
Start: 2023-06-27 | End: 2023-06-27

## 2023-06-27 RX ADMIN — IOPAMIDOL 113 ML: 755 INJECTION, SOLUTION INTRAVASCULAR at 08:30

## 2023-06-27 NOTE — RESULT ENCOUNTER NOTE
Epi Bliss,  Well, the CT did show that mass as previously seen.  Unfortunately it still listed a couple of different possibilities so we do not have a definitive answer yet at this time but it does appear to be some type of tumor.  So I spoke with Dr. Judge, one of our cancer specialists, about how to approach this.  I put in a referral to the oncology folks as a follow-up and they will be calling you to get scheduled.  But the biggest thing is trying to find out exactly what we are dealing with.  So I have placed a referral to our GI specialists to find out what type of procedure can actually get a piece of this tissue.  I suspect it will be done through endoscopy where they can look with an ultrasound and get a piece of the tissue.  But expect a call from them very soon so we can figure this out.  I wanted to put this in writing so that you can get a chance to digest it, but I am also happy to talk to you as well.  So let me know and I will give you a call if needed.  MARIELLE Muñoz M.D.

## 2023-06-27 NOTE — TELEPHONE ENCOUNTER
[facility]     Referring provider: Keila Muñoz MD    Referred to: [facility]     Provider Requested: none specified     Referral Received: 6/27/23     Records received: Epic and CE    CT  6/27/23   IMPRESSION:   1. Approximately 3.9 cm heterogeneously enhancing and multicystic   appearing pancreatic body mass with mild upstream main pancreatic   ductal dilatation. Differential includes cystic neoplasm including   malignant transformation of serous cystadenoma and neuroendocrine   tumor. There is abutment of the proximal jejunum as well as splenic   vein and splenic artery.   2. Stable few prominent periportal lymph nodes since 1/4/2017, likely   reactive. No definite evidence for abdominopelvic metastatic disease.   3. Somewhat bilobed approximately 2.2 cm benign hemangioma in hepatic   segment 4A.   4. Approximately 6 mm nonobstructive stone in the right lower renal   Pole    CT 6/21/23  IMPRESSION:   1.  2 mm obstructing stone at the left ureterovesical junction nearly passed into the urinary bladder with mild secondary hydronephrosis. 6 mm nonobstructing right kidney stone.   2.  3.7 x 2.8 cm mass noted within the body of the pancreas. This is suspicious for malignancy. Recommend follow-up contrast enhanced CT scanning of the abdomen and pelvis more detailed evaluation.   3.  Diverticulosis.   4.  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         Images received: PACs    Insurance Coverage: BCBS Medicare    Evaluation for: pancreatic mass     Clinical History (per RN review):     ED visit 6/21/23  73 y.o. male presents with left low back and flank pain. The patient states that this feels similar to nephrolithiasis, and his urine is consistent with a kidney stone. There is no signs suggestive of infection. A CT scan was obtained that demonstrates a 2 mm stone in the distal ureter. The patient's pain was controlled with prehospital  Toradol. We will plan to discharge him home with ibuprofen, Flomax, Zofran, and Norco as needed for breakthrough pain. Otherwise, an incidental note was made of a pancreatic mass. I discussed this finding with the patient as well as incidental findings of diverticulitis and spondylolysis. He understands that he will need to follow-up with his primary care doctor to get an outpatient CT scan with contrast. We also discussed the nature of the timeline associated with this. He understands that if he has any trouble arranging this he can always return to the ER. Otherwise we discussed the need for follow-up, straining his urine, and expectant management for his kidney stone moving forward.        MD review date: 6/27/23  MD Decision for clinic consultation/Orders:    EUS with Dr Vasquez , pt called on 6/28 to coordinate        Referral updates/Patient contacted:

## 2023-06-27 NOTE — PROGRESS NOTES
Called [facility] and left . Urgently requested that they push recent CT to [facility] PACS. Left callback number.    Imaging Request:   CT ABDOMEN & PELVIS W/O ORAL W/O IV CON (DATE: 6/21/2023 3:09 PM)    Facility Information:  [facility]  [address] , [place] [address]  Phone #: [phone]      SK

## 2023-06-28 ENCOUNTER — PATIENT OUTREACH (OUTPATIENT)
Dept: GASTROENTEROLOGY | Facility: CLINIC | Age: 73
End: 2023-06-28
Payer: COMMERCIAL

## 2023-06-28 ENCOUNTER — PREP FOR PROCEDURE (OUTPATIENT)
Dept: GASTROENTEROLOGY | Facility: CLINIC | Age: 73
End: 2023-06-28
Payer: COMMERCIAL

## 2023-06-28 ENCOUNTER — PATIENT OUTREACH (OUTPATIENT)
Dept: ONCOLOGY | Facility: CLINIC | Age: 73
End: 2023-06-28
Payer: COMMERCIAL

## 2023-06-28 DIAGNOSIS — R93.89 ABNORMAL FINDING ON IMAGING: Primary | ICD-10-CM

## 2023-06-28 DIAGNOSIS — K86.89 PANCREATIC MASS: ICD-10-CM

## 2023-06-28 NOTE — TELEPHONE ENCOUNTER
Procedure/Imaging/Clinic: EUS   Physician: Dr Vasquez   Timin23   Scope time needed: 45 min   Anesthesia: MAC   Dx: abnormal finding on imaging   Tier: 2   Location: [facility] OR   Header of letter for pt communication: Endoscopic ultrasound   Referring provider: Keila Galdamez    Discussed that  may not be possible d/t OR capacity. Next available date of  also discussed. Pt would like to try for tomorrow if possible    Explained they can expect a call from  for date and time of procedure, will need a , someone to stay with them for 24 hours and should stay in town for 24 hours (within 45 min of Hospital) post procedure    Patient needs to get pre-op physical completed. If outside  health system will need physical faxed to number [phone]   If you do not get a preop physical, your procedure could be cancelled, patient voiced understanding*    Preop Plan: ED visit from 23, Dr Vasquez will update if needed    Does patient have [employer] insurance?: none    Med Review    Blood thinner -  none  ASA - 81mg  Diabetic - glipizide and metformin - advised to hold morning of procedure  Any meds by injection or mouth for weight loss or diabetes- none  Lisinopril -  Advised to hold morning of procedure    Patient Education r/t procedure: greyt    A pre-op nurse will call 1-2 days prior to the procedure.    NPO/Prep:   Adults and Children of all ages may consume solids up to 8 hours prior to arrival time - may consume clear liquids up to 1 hour prior to arrival time.    Verbalized understanding of all instructions. All questions answered.     Procedure order placed, message routed to OR      1020  Called pt to confirm timing of procedure for , arrival at 1:45pm. Left , [employer] message with procedure details sent. Message routed to [facility] staff to call pt today if possible.    1040  Pt returned call, understands plan for tomorrow.     Ekaterina Collier, RN, BSN,   Advanced  Gastroenterology  Care coordinator

## 2023-06-29 ENCOUNTER — HOSPITAL ENCOUNTER (OUTPATIENT)
Facility: CLINIC | Age: 73
Discharge: HOME | End: 2023-06-29
Attending: INTERNAL MEDICINE | Admitting: INTERNAL MEDICINE
Payer: COMMERCIAL

## 2023-06-29 ENCOUNTER — ANESTHESIA (OUTPATIENT)
Dept: SURGERY | Facility: CLINIC | Age: 73
End: 2023-06-29
Payer: COMMERCIAL

## 2023-06-29 ENCOUNTER — ANESTHESIA EVENT (OUTPATIENT)
Dept: SURGERY | Facility: CLINIC | Age: 73
End: 2023-06-29
Payer: COMMERCIAL

## 2023-06-29 VITALS
TEMPERATURE: 98.1 F | SYSTOLIC BLOOD PRESSURE: 147 MMHG | DIASTOLIC BLOOD PRESSURE: 79 MMHG | HEART RATE: 63 BPM | WEIGHT: 208.34 LBS | HEIGHT: 71 IN | BODY MASS INDEX: 29.17 KG/M2 | OXYGEN SATURATION: 97 % | RESPIRATION RATE: 16 BRPM

## 2023-06-29 LAB
GLUCOSE BLDC GLUCOMTR-MCNC: 178 MG/DL (ref 70–99)
GLUCOSE BLDC GLUCOMTR-MCNC: 217 MG/DL (ref 70–99)

## 2023-06-29 PROCEDURE — 88313 SPECIAL STAINS GROUP 2: CPT | Mod: 26 | Performed by: PATHOLOGY

## 2023-06-29 PROCEDURE — 250N000009 HC RX 250: Performed by: INTERNAL MEDICINE

## 2023-06-29 PROCEDURE — 710N000012 HC RECOVERY PHASE 2, PER MINUTE: Performed by: INTERNAL MEDICINE

## 2023-06-29 PROCEDURE — 360N000075 HC SURGERY LEVEL 2, PER MIN: Performed by: INTERNAL MEDICINE

## 2023-06-29 PROCEDURE — 88172 CYTP DX EVAL FNA 1ST EA SITE: CPT | Mod: 26 | Performed by: PATHOLOGY

## 2023-06-29 PROCEDURE — 250N000009 HC RX 250

## 2023-06-29 PROCEDURE — 250N000011 HC RX IP 250 OP 636: Mod: JZ

## 2023-06-29 PROCEDURE — 999N000141 HC STATISTIC PRE-PROCEDURE NURSING ASSESSMENT: Performed by: INTERNAL MEDICINE

## 2023-06-29 PROCEDURE — 258N000003 HC RX IP 258 OP 636

## 2023-06-29 PROCEDURE — 88305 TISSUE EXAM BY PATHOLOGIST: CPT | Mod: TC | Performed by: INTERNAL MEDICINE

## 2023-06-29 PROCEDURE — 82962 GLUCOSE BLOOD TEST: CPT

## 2023-06-29 PROCEDURE — 272N000001 HC OR GENERAL SUPPLY STERILE: Performed by: INTERNAL MEDICINE

## 2023-06-29 PROCEDURE — 370N000017 HC ANESTHESIA TECHNICAL FEE, PER MIN: Performed by: INTERNAL MEDICINE

## 2023-06-29 PROCEDURE — 250N000011 HC RX IP 250 OP 636

## 2023-06-29 PROCEDURE — 88305 TISSUE EXAM BY PATHOLOGIST: CPT | Mod: 26 | Performed by: PATHOLOGY

## 2023-06-29 PROCEDURE — 88173 CYTOPATH EVAL FNA REPORT: CPT | Mod: 26 | Performed by: PATHOLOGY

## 2023-06-29 RX ORDER — LIDOCAINE HYDROCHLORIDE 20 MG/ML
INJECTION, SOLUTION INFILTRATION; PERINEURAL PRN
Status: DISCONTINUED | OUTPATIENT
Start: 2023-06-29 | End: 2023-06-29

## 2023-06-29 RX ORDER — SODIUM CHLORIDE, SODIUM LACTATE, POTASSIUM CHLORIDE, CALCIUM CHLORIDE 600; 310; 30; 20 MG/100ML; MG/100ML; MG/100ML; MG/100ML
INJECTION, SOLUTION INTRAVENOUS CONTINUOUS
Status: DISCONTINUED | OUTPATIENT
Start: 2023-06-29 | End: 2023-06-29 | Stop reason: HOSPADM

## 2023-06-29 RX ORDER — FLUMAZENIL 0.1 MG/ML
0.2 INJECTION, SOLUTION INTRAVENOUS
Status: DISCONTINUED | OUTPATIENT
Start: 2023-06-29 | End: 2023-06-29 | Stop reason: HOSPADM

## 2023-06-29 RX ORDER — NALOXONE HYDROCHLORIDE 0.4 MG/ML
0.4 INJECTION, SOLUTION INTRAMUSCULAR; INTRAVENOUS; SUBCUTANEOUS
Status: DISCONTINUED | OUTPATIENT
Start: 2023-06-29 | End: 2023-06-29 | Stop reason: HOSPADM

## 2023-06-29 RX ORDER — NALOXONE HYDROCHLORIDE 0.4 MG/ML
0.2 INJECTION, SOLUTION INTRAMUSCULAR; INTRAVENOUS; SUBCUTANEOUS
Status: DISCONTINUED | OUTPATIENT
Start: 2023-06-29 | End: 2023-06-29 | Stop reason: HOSPADM

## 2023-06-29 RX ORDER — LIDOCAINE 40 MG/G
CREAM TOPICAL
Status: DISCONTINUED | OUTPATIENT
Start: 2023-06-29 | End: 2023-06-29 | Stop reason: HOSPADM

## 2023-06-29 RX ORDER — ONDANSETRON 4 MG/1
4 TABLET, ORALLY DISINTEGRATING ORAL EVERY 6 HOURS PRN
Status: DISCONTINUED | OUTPATIENT
Start: 2023-06-29 | End: 2023-06-29 | Stop reason: HOSPADM

## 2023-06-29 RX ORDER — FENTANYL CITRATE 50 UG/ML
50 INJECTION, SOLUTION INTRAMUSCULAR; INTRAVENOUS EVERY 5 MIN PRN
Status: DISCONTINUED | OUTPATIENT
Start: 2023-06-29 | End: 2023-06-29 | Stop reason: HOSPADM

## 2023-06-29 RX ORDER — ONDANSETRON 4 MG/1
4 TABLET, ORALLY DISINTEGRATING ORAL EVERY 30 MIN PRN
Status: DISCONTINUED | OUTPATIENT
Start: 2023-06-29 | End: 2023-06-29 | Stop reason: HOSPADM

## 2023-06-29 RX ORDER — ONDANSETRON 2 MG/ML
4 INJECTION INTRAMUSCULAR; INTRAVENOUS EVERY 30 MIN PRN
Status: DISCONTINUED | OUTPATIENT
Start: 2023-06-29 | End: 2023-06-29 | Stop reason: HOSPADM

## 2023-06-29 RX ORDER — SODIUM CHLORIDE, SODIUM LACTATE, POTASSIUM CHLORIDE, CALCIUM CHLORIDE 600; 310; 30; 20 MG/100ML; MG/100ML; MG/100ML; MG/100ML
INJECTION, SOLUTION INTRAVENOUS CONTINUOUS PRN
Status: DISCONTINUED | OUTPATIENT
Start: 2023-06-29 | End: 2023-06-29

## 2023-06-29 RX ORDER — HYDROMORPHONE HYDROCHLORIDE 1 MG/ML
0.4 INJECTION, SOLUTION INTRAMUSCULAR; INTRAVENOUS; SUBCUTANEOUS EVERY 5 MIN PRN
Status: DISCONTINUED | OUTPATIENT
Start: 2023-06-29 | End: 2023-06-29 | Stop reason: HOSPADM

## 2023-06-29 RX ORDER — HYDROMORPHONE HYDROCHLORIDE 1 MG/ML
0.2 INJECTION, SOLUTION INTRAMUSCULAR; INTRAVENOUS; SUBCUTANEOUS EVERY 5 MIN PRN
Status: DISCONTINUED | OUTPATIENT
Start: 2023-06-29 | End: 2023-06-29 | Stop reason: HOSPADM

## 2023-06-29 RX ORDER — ONDANSETRON 2 MG/ML
4 INJECTION INTRAMUSCULAR; INTRAVENOUS EVERY 6 HOURS PRN
Status: DISCONTINUED | OUTPATIENT
Start: 2023-06-29 | End: 2023-06-29 | Stop reason: HOSPADM

## 2023-06-29 RX ORDER — EPHEDRINE SULFATE 50 MG/ML
INJECTION, SOLUTION INTRAMUSCULAR; INTRAVENOUS; SUBCUTANEOUS PRN
Status: DISCONTINUED | OUTPATIENT
Start: 2023-06-29 | End: 2023-06-29

## 2023-06-29 RX ORDER — PROPOFOL 10 MG/ML
INJECTION, EMULSION INTRAVENOUS CONTINUOUS PRN
Status: DISCONTINUED | OUTPATIENT
Start: 2023-06-29 | End: 2023-06-29

## 2023-06-29 RX ORDER — FENTANYL CITRATE 50 UG/ML
25 INJECTION, SOLUTION INTRAMUSCULAR; INTRAVENOUS EVERY 5 MIN PRN
Status: DISCONTINUED | OUTPATIENT
Start: 2023-06-29 | End: 2023-06-29 | Stop reason: HOSPADM

## 2023-06-29 RX ADMIN — EPHEDRINE SULFATE 10 MG: 5 INJECTION INTRAVENOUS at 15:25

## 2023-06-29 RX ADMIN — EPHEDRINE SULFATE 10 MG: 5 INJECTION INTRAVENOUS at 15:29

## 2023-06-29 RX ADMIN — PROPOFOL 150 MCG/KG/MIN: 10 INJECTION, EMULSION INTRAVENOUS at 14:56

## 2023-06-29 RX ADMIN — SODIUM CHLORIDE, POTASSIUM CHLORIDE, SODIUM LACTATE AND CALCIUM CHLORIDE: 600; 310; 30; 20 INJECTION, SOLUTION INTRAVENOUS at 14:55

## 2023-06-29 RX ADMIN — LIDOCAINE HYDROCHLORIDE 50 MG: 20 INJECTION, SOLUTION INFILTRATION; PERINEURAL at 14:56

## 2023-06-29 RX ADMIN — EPHEDRINE SULFATE 5 MG: 5 INJECTION INTRAVENOUS at 15:34

## 2023-06-29 RX ADMIN — TOPICAL ANESTHETIC 1 SPRAY: 200 SPRAY DENTAL; PERIODONTAL at 14:51

## 2023-06-29 RX ADMIN — PROPOFOL 50 MG: 10 INJECTION, EMULSION INTRAVENOUS at 15:03

## 2023-06-29 RX ADMIN — PHENYLEPHRINE HYDROCHLORIDE 100 MCG: 10 INJECTION INTRAVENOUS at 15:19

## 2023-06-29 ASSESSMENT — ACTIVITIES OF DAILY LIVING (ADL)
ADLS_ACUITY_SCORE: 35
ADLS_ACUITY_SCORE: 35

## 2023-06-29 NOTE — ANESTHESIA PREPROCEDURE EVALUATION
Anesthesia Pre-Procedure Evaluation    Patient: Rick Sandhu   MRN: [id number] : 1950        Procedure : Procedure(s):  ENDOSCOPIC ULTRASOUND, ESOPHAGOSCOPY / UPPER GASTROINTESTINAL TRACT (GI)          Past Medical History:   Diagnosis Date     Hyperlipidemia LDL goal <100 10/20/2010     Hypertension goal BP (blood pressure) < 140/90 2011     Nonsenile cataract      Sarcoidosis      Type 2 diabetes, HbA1C goal < 8% (H) 10/20/2010      Past Surgical History:   Procedure Laterality Date     ABDOMEN SURGERY      Appendix removed     BIOPSY      Biopsy of spot on lung - sarcodosis     COLONOSCOPY  2016    2nd colonoscopy     COLONOSCOPY WITH CO2 INSUFFLATION N/A 2016    Procedure: COLONOSCOPY WITH CO2 INSUFFLATION;  Surgeon: Duane, William Charles, MD;  Location: [facility] OR     OTHER SURGICAL HISTORY Left      ROTATOR CUFF REPAIR RT/LT  2006    Left     ROTATOR CUFF REPAIR RT/LT      Right     ROTATOR CUFF REPAIR RT/LT      Right - redo     VASECTOMY  1982     ZZC APPENDECTOMY        Allergies   Allergen Reactions     Penicillin G Hives      Social History     Tobacco Use     Smoking status: Former     Packs/day: 0.00     Years: 0.00     Pack years: 0.00     Types: Cigarettes     Start date: 1964     Quit date: 8/15/1987     Years since quittin.8     Passive exposure: Past     Smokeless tobacco: Never   Substance Use Topics     Alcohol use: Yes     Alcohol/week: 0.0 standard drinks of alcohol     Comment: 1-2 drinks every other week      Wt Readings from Last 1 Encounters:   23 93 kg (205 lb 1.6 oz)        Anesthesia Evaluation   Pt has had prior anesthetic. Type: General and MAC.        ROS/MED HX  ENT/Pulmonary: Comment: Remote history of Pulmonary Sarcoidosis      Neurologic:  - neg neurologic ROS     Cardiovascular:     (+) hypertension-----    METS/Exercise Tolerance:     Hematologic:  - neg hematologic  ROS     Musculoskeletal:  - neg musculoskeletal ROS      GI/Hepatic:  - neg GI/hepatic ROS     Renal/Genitourinary:  - neg Renal ROS     Endo:     (+) type II DM,     Psychiatric/Substance Use:  - neg psychiatric ROS     Infectious Disease:  - neg infectious disease ROS     Malignancy:  - neg malignancy ROS     Other:            Physical Exam    Airway        Mallampati: II   TM distance: > 3 FB   Neck ROM: full   Mouth opening: > 3 cm    Respiratory Devices and Support         Dental       (+) Modest Abnormalities - crowns, retainers, 1 or 2 missing teeth      Cardiovascular   cardiovascular exam normal          Pulmonary   pulmonary exam normal                OUTSIDE LABS:  CBC:   Lab Results   Component Value Date    WBC 6.7 03/24/2010    HGB 16.6 03/24/2010    HCT 45.9 03/24/2010     03/24/2010     BMP:   Lab Results   Component Value Date     10/24/2022     02/16/2022    POTASSIUM 4.1 10/24/2022    POTASSIUM 3.7 02/16/2022    CHLORIDE 100 10/24/2022    CHLORIDE 104 02/16/2022    CO2 28 10/24/2022    CO2 33 (H) 02/16/2022    BUN 18 10/24/2022    BUN 17 02/16/2022    CR 1.0 06/27/2023    CR 0.93 10/24/2022     (H) 10/24/2022     (H) 02/16/2022     COAGS: No results found for: PTT, INR, FIBR  POC:   Lab Results   Component Value Date     (H) 08/23/2016     HEPATIC:   Lab Results   Component Value Date    ALBUMIN 4.2 10/24/2022    PROTTOTAL 7.4 10/24/2022    ALT 36 10/24/2022    AST 24 10/24/2022    ALKPHOS 44 10/24/2022    BILITOTAL 1.3 10/24/2022     OTHER:   Lab Results   Component Value Date    A1C 7.6 (H) 04/14/2023    TANG 9.7 10/24/2022    PHOS 3.4 09/19/2012    TSH 2.68 08/27/2019       Anesthesia Plan    ASA Status:  2      Anesthesia Type: MAC.   Induction: Propofol.   Maintenance: Balanced.        Consents    Anesthesia Plan(s) and associated risks, benefits, and realistic alternatives discussed. Questions answered and patient/representative(s) expressed understanding.    - Discussed:     - Discussed with:  Patient       - Extended Intubation/Ventilatory Support Discussed: Yes.      - Patient is DNR/DNI Status: No    Use of blood products discussed: No .     Postoperative Care    Pain management: Multi-modal analgesia.   PONV prophylaxis: Ondansetron (or other 5HT-3)     Comments:              PAC Discussion and Assessment    ASA Classification: 2    Anesthetic techniques and relevant risks discussed: MAC with GA as backup  Invasive monitoring and risk discussed: No    Possibility and Risk of blood transfusion discussed: No  NPO instructions given: NPO after midnight    Needs early admission to pre-op area: No                                             Kassandra Salgado MD

## 2023-06-29 NOTE — BRIEF OP NOTE
[facility]    Brief Operative Note    Pre-operative diagnosis: Abnormal finding on imaging [R93.89]  Pancreatic mass [K86.89]  Post-operative diagnosis Multicystic pancreatic mass    Procedure: Procedure(s):  ENDOSCOPIC ULTRASOUND, with needle biopsy  Surgeon: Surgeon(s) and Role:     * Puma Vasquez MD - Primary  Anesthesia: MAC   Estimated Blood Loss: Minimal    Drains: None  Specimens:   ID Type Source Tests Collected by Time Destination   1 : pancreatic body mass Fine Needle Aspiration Other FINE NEEDLE ASPIRATE Puma Vasquez MD 6/29/2023  3:23 PM      Findings:   Microcystic mass in the pancreatic body. Majority solid/spongiform with a few small anechoic cysts on the rim. Numerous small caliber vessels throughout the lesion. Mild upstream dilation of the pancreatic duct. Needle biopsy performed. Initial pass with a 25 ga Acquire needle without material. Three passes with a 22 ga Acquire needle with visible solid material. Preliminary cytology showed adequate cellularity.     Otherwise normal EUS exam.    Complications: None.  Implants: * No implants in log *     GALILEA Vasquez MD  Professor of Medicine  Division of Gastroenterology, Hepatology and Nutrition  [facility]

## 2023-06-29 NOTE — PROGRESS NOTES
Review of Oncology referral fr [facility] PCP for patient with CT concerning for pancreatic malignancy.      6/21/23 & 6/27/23 CT AP with panc mass, EUS planned 6/29. Presumed pancreatic cancer, PCP already reached out to Dr Judge who advised work up. Will schedule w/ first available Onc at [place] (Dr Judge 7/5/23) or schedule per pt preference.    Renato Bhardwaj RN  Oncology Nurse Navigator  [facility]  [phone]

## 2023-06-29 NOTE — ANESTHESIA POSTPROCEDURE EVALUATION
Patient: Rick Sandhu    Procedure: Procedure(s):  ENDOSCOPIC ULTRASOUND, with needle biopsy       Anesthesia Type:  MAC    Note:  Disposition: Outpatient   Postop Pain Control: Uneventful            Sign Out: Well controlled pain   PONV: No   Neuro/Psych: Uneventful            Sign Out: Acceptable/Baseline neuro status   Airway/Respiratory: Uneventful            Sign Out: Acceptable/Baseline resp. status   CV/Hemodynamics: Uneventful            Sign Out: Acceptable CV status; No obvious hypovolemia; No obvious fluid overload   Other NRE: NONE   DID A NON-ROUTINE EVENT OCCUR? No           Last vitals:  Vitals Value Taken Time   /79 06/29/23 1630   Temp 36.6  C (97.9  F) 06/29/23 1541   Pulse 63 06/29/23 1630   Resp 16 06/29/23 1618   SpO2 98 % 06/29/23 1631   Vitals shown include unvalidated device data.    Electronically Signed By: Jordon Sanches MD  June 29, 2023  4:32 PM

## 2023-06-29 NOTE — DISCHARGE INSTRUCTIONS
[facility] Endoscopic Ultrasound with Monitored Anesthesia Care  For 24 hours after your procedure  Sedation:  Get plenty of rest. A responsible adult must stay with you for at least 24 hours after you leave the hospital.   Do not drive or use heavy equipment. If you have weakness or tingling, don't drive or use heavy equipment until this feeling goes away.  Do not drink alcohol.  Avoid strenuous or risky activities. Ask for help when climbing stairs.   You may feel lightheaded. IF so, sit for a few minutes before standing. Have someone help you get up.   If you have nausea (feel sick to your stomach): Drink only clear liquids such as apple juice, ginger ale, broth or 7-Up. Rest may also help. Be sure to drink enough fluids. Move to a regular diet as you feel able.  You may have a slight fever. Call the doctor if your fever is over 100 F (37.7 C) (taken under the tongue) or lasts longer than 24 hours.  You may have a dry mouth, a sore throat, muscle aches or trouble sleeping. These should go away after 24 hours.  Do not make important or legal decisions.   Procedural:  Wait one hour before eating or drinking. Start with sips of water. When your gag reflex has returned, you may return to your normal diet, medicines, and light exercise.  Some bloating is normal. You may have large burps or pass air.  You may have a sore throat for 2 to 3 days. If so, it may help to:  Avoid hot liquids for 24 hours.  Use sore throat lozenges.  Gargle as need with salt water up to 4 times a day. Mix 1 cup of warm water with 1 teaspoon of salt. Do not swallow.  You may take Tylenol (acetaminophen) for pain unless your doctor has told you not to.   Do not take aspirin or ibuprofen (Advil, Motrin, or other anti-inflammatory  drugs) for _____ days.  Call right away if you have:  Unusual pain in belly or chest pain not relieved with passing air.  Severe throat pain or trouble swallowing.  Black stools (tar-like looking bowel movement).  Signs of  infection (fever, growing tenderness at the surgery site, a large amount of drainage or bleeding, severe pain, foul-smelling drainage, redness, swelling).  It has been over 8 to 10 hours since surgery and you are still not able to urinate (pass water).  Headache for over 24 hours.  Numbness, tingling or weakness the day after surgery (if you had spinal anesthesia).  Follow-up:  ____ We took small tissue or fluid samples. Your doctor will call you with the results within two weeks.  If you have severe pain, bleeding, vomit blood, or shortness of breath, go to an emergency room.  If you have questions, call: Dr Vasquez at the  GI clinic at [phone];   Monday to Friday, 8 a.m. to 4:30 p.m.   After hours: Hospital  [phone] (Ask for the GI fellow on call)

## 2023-06-29 NOTE — ANESTHESIA CARE TRANSFER NOTE
Patient: Rick Sandhu    Procedure: Procedure(s):  ENDOSCOPIC ULTRASOUND, with needle biopsy       Diagnosis: Abnormal finding on imaging [R93.89]  Pancreatic mass [K86.89]  Diagnosis Additional Information: No value filed.    Anesthesia Type:   MAC     Note:    Oropharynx: oropharynx clear of all foreign objects and spontaneously breathing  Level of Consciousness: drowsy  Oxygen Supplementation: room air    Independent Airway: airway patency satisfactory and stable  Dentition: dentition unchanged  Vital Signs Stable: post-procedure vital signs reviewed and stable  Report to RN Given: handoff report given  Patient transferred to: Phase II    Handoff Report: Identifed the Patient, Identified the Reponsible Provider, Reviewed the pertinent medical history, Discussed the surgical course, Reviewed Intra-OP anesthesia mangement and issues during anesthesia, Set expectations for post-procedure period and Allowed opportunity for questions and acknowledgement of understanding      Vitals:  Vitals Value Taken Time    55    Temp 97.9    Pulse 68    Resp 15    SpO2 99 % 06/29/23 1542   Vitals shown include unvalidated device data.    Electronically Signed By: GINO Palma CRNA  June 29, 2023  3:43 PM

## 2023-06-30 NOTE — TELEPHONE ENCOUNTER
RECORDS STATUS - ALL OTHER DIAGNOSIS    Pancreatic mass  RECORDS RECEIVED FROM:    Appt Date: 7/5/2023   NOTES STATUS DETAILS   OFFICE NOTE from referring provider Complete Epic   Keila Muñoz MD   MEDICATION LIST Complete [facility]   CLINICAL TRIAL TREATMENTS TO DATE     LABS     PATHOLOGY REPORTS  Fine Needle - In Process    ANYTHING RELATED TO DIAGNOSIS Complete Labs last updated on 6/29/2023    GENONOMIC TESTING     TYPE:     IMAGING (NEED IMAGES & REPORT)     CT SCANS Complete  CT Abdomen 6/27/2023     CT Abdomen 6/21/2023    MRI     MAMMO     ULTRASOUND     PET

## 2023-07-05 ENCOUNTER — ONCOLOGY VISIT (OUTPATIENT)
Dept: ONCOLOGY | Facility: CLINIC | Age: 73
End: 2023-07-05
Attending: FAMILY MEDICINE
Payer: COMMERCIAL

## 2023-07-05 ENCOUNTER — PRE VISIT (OUTPATIENT)
Dept: ONCOLOGY | Facility: CLINIC | Age: 73
End: 2023-07-05

## 2023-07-05 VITALS
BODY MASS INDEX: 29 KG/M2 | OXYGEN SATURATION: 97 % | DIASTOLIC BLOOD PRESSURE: 66 MMHG | HEART RATE: 87 BPM | TEMPERATURE: 97.6 F | SYSTOLIC BLOOD PRESSURE: 99 MMHG | RESPIRATION RATE: 16 BRPM | WEIGHT: 207.9 LBS

## 2023-07-05 DIAGNOSIS — R93.89 ABNORMAL FINDING ON IMAGING: ICD-10-CM

## 2023-07-05 DIAGNOSIS — K86.89 PANCREATIC MASS: Primary | ICD-10-CM

## 2023-07-05 LAB
PATH REPORT.COMMENTS IMP SPEC: ABNORMAL
PATH REPORT.COMMENTS IMP SPEC: YES
PATH REPORT.FINAL DX SPEC: ABNORMAL
PATH REPORT.GROSS SPEC: ABNORMAL
PATH REPORT.MICROSCOPIC SPEC OTHER STN: ABNORMAL
PATH REPORT.RELEVANT HX SPEC: ABNORMAL

## 2023-07-05 PROCEDURE — 99204 OFFICE O/P NEW MOD 45 MIN: CPT | Performed by: INTERNAL MEDICINE

## 2023-07-05 ASSESSMENT — PAIN SCALES - GENERAL: PAINLEVEL: NO PAIN (1)

## 2023-07-05 NOTE — NURSING NOTE
"Oncology Rooming Note    July 5, 2023 11:07 AM   Rick Sandhu is a 73 year old male who presents for:    Chief Complaint   Patient presents with     Oncology Clinic Visit     New patient visit     Initial Vitals: BP 99/66   Pulse 87   Temp 97.6  F (36.4  C) (Oral)   Resp 16   Wt 94.3 kg (207 lb 14.4 oz)   SpO2 97%   BMI 29.00 kg/m   Estimated body mass index is 29 kg/m  as calculated from the following:    Height as of 6/29/23: 1.803 m (5' 11\").    Weight as of this encounter: 94.3 kg (207 lb 14.4 oz). Body surface area is 2.17 meters squared.  No Pain (1) Comment: Data Unavailable   No LMP for male patient.  Allergies reviewed: Yes  Medications reviewed: Yes    Medications: Medication refills not needed today.  Pharmacy name entered into "[employer]: [id number] [place] [address]    Cynthia Nesbitt RN              "

## 2023-07-05 NOTE — LETTER
2023         RE: Rick Sandhu  [id number] [facility]  [place] [address]        Dear Colleague,    Thank you for referring your patient, Rick Sandhu, to the [facility]. Please see a copy of my visit note below.    [facility] Hematology / Oncology  Initial Visit / Consultation Note 2023  Name: Rick Sandhu  :  1950  MRN:  [id number]    --------------------    Assessment / Plan:  Over the course of our visit, Epi, his wife and I reviewed the natural history of what appears to be a cystic neoplasm of the pancreas that has been stable for quite some time.  His FNA is officially pending but I suspect will come back as a benign etiology consistent with an IPMN or something along that venue ranges from benign to at risk for malignancy, but not specifically malignant.  We are fortunate in the sense that we have old imaging for comparison we reviewed his imaging together that truly does show some long-term stability with respect to the pancreatic mass and some localized nonspecific adenopathy.  He remains symptom-free and given that I suspect mass will come back as either benign or premalignant, I suspect we will be looking at some form of surveillance imaging moving forward in the future that may even be potentially annually.  I will discuss his case with Dr. Vasquez and if there is any in decision, his case could be discussed at the pancreaticobiliary conference.    Thank you kindly for this consultation.  Mark Judge MD    --------------------    Interval History:  Rick present for evaluation of pancreatic mass.  Epi was in his usual state of health when he began developing signs suggestive of a kidney stone, something he has dealt with previously.  He presented to the emergency room where a CT of the abdomen and pelvis revealed a 2 mm obstructing stone at the left ureteral vesicle junction nearly passed into the urinary bladder with mild  secondary hydronephrosis as well as a 6 mm nonobstructing right kidney stone.  Incidentally a 3.7 x 2.8 cm mass was noted in the body of the pancreas suspicious for malignancy.  Incidental diverticulosis and spondylolysis was also noted.  A dedicated CT of the pancreas was then followed up which revealed an approximately 3.9 cm heterogeneously enhancing and multicystic pancreatic body mass with mild upstream main pancreatic ductal dilatation abutting the proximal jejunum as well as splenic vein and splenic artery.  Several stable few prominent periportal lymph nodes were noted as well dating back to 2017, likely reactive.  Somewhat bilobed approximately 2.2 cm benign hemangioma in hepatic segment 4A was also noted.  At the time of our visit, he has undergone an EUS with biopsy pending from Dr. Brown.  He feels great and healthy.  No unexplained nausea or vomiting.  Stable weight appetite and energy.  He is enjoying his life.  Quality of life is good.  No itching or jaundice.    --------------------    Review of Systems:  10 point ROS negative except for that above.    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     COLONOSCOPY  2016 2nd colonoscopy     COLONOSCOPY WITH CO2 INSUFFLATION N/A 08/23/2016    Procedure: COLONOSCOPY WITH CO2 INSUFFLATION;  Surgeon: Duane, William Charles, MD;  Location: [facility] OR     ENDOSCOPIC ULTRASOUND UPPER GASTROINTESTINAL TRACT (GI) N/A 6/29/2023    Procedure: ENDOSCOPIC ULTRASOUND, with needle biopsy;  Surgeon: Puma Vasquez MD;  Location: [facility] OR     OTHER SURGICAL HISTORY Left      ROTATOR CUFF  REPAIR RT/LT  2006    Left     ROTATOR CUFF REPAIR RT/LT      Right     ROTATOR CUFF REPAIR RT/LT      Right - redo     VASECTOMY  1982     ZZC APPENDECTOMY         Family History:  Family History   Problem Relation Age of Onset     Prostate Cancer Brother      Cancer Father         Thyroid     Hypertension Father      Other Cancer Father         Thyroid     Diabetes Brother      Hypertension Mother      Depression Mother      Breast Cancer Paternal Aunt      Diabetes Brother      Other Cancer Brother         Basal Cell Carcinoma     Thyroid Disease Brother         Thyroid removed     Prostate Cancer Brother         Prostate       Social History:  Social History     Socioeconomic History     Marital status:    Tobacco Use     Smoking status: Former     Packs/day: 0.00     Years: 0.00     Pack years: 0.00     Types: Cigarettes     Start date: 1964     Quit date: 8/15/1987     Years since quittin.9     Passive exposure: Past     Smokeless tobacco: Never   Vaping Use     Vaping Use: Never used   Substance and Sexual Activity     Alcohol use: Yes     Alcohol/week: 0.0 standard drinks of alcohol     Comment: 1-2 drinks every other week     Drug use: No     Sexual activity: Not Currently     Partners: Female   Other Topics Concern     Parent/sibling w/ CABG, MI or angioplasty before 65F 55M? No       Medications / Allergies:  Reviewed in EMR.    --------------------    Physical Exam:  VS: BP 99/66   Pulse 87   Temp 97.6  F (36.4  C) (Oral)   Resp 16   Wt 94.3 kg (207 lb 14.4 oz)   SpO2 97%   BMI 29.00 kg/m    GEN: Well appearing.    Labs / Imaging / Path:  We Personally reviewed his CT of the abdomen and pelvis as well as CT pancreas protocol together.  We also reviewed the CT of the abdomen pelvis from 2017 for comparison.      Again, thank you for allowing me to participate in the care of your patient.        Sincerely,        Mark Judge MD

## 2023-07-07 NOTE — PROGRESS NOTES
[facility] Hematology / Oncology  Initial Visit / Consultation Note 2023  Name: Rick Sandhu  :  1950  MRN:  [id number]    --------------------    Assessment / Plan:  Over the course of our visit, Epi, his wife and I reviewed the natural history of what appears to be a cystic neoplasm of the pancreas that has been stable for quite some time.  His FNA is officially pending but I suspect will come back as a benign etiology consistent with an IPMN or something along that venue ranges from benign to at risk for malignancy, but not specifically malignant.  We are fortunate in the sense that we have old imaging for comparison we reviewed his imaging together that truly does show some long-term stability with respect to the pancreatic mass and some localized nonspecific adenopathy.  He remains symptom-free and given that I suspect mass will come back as either benign or premalignant, I suspect we will be looking at some form of surveillance imaging moving forward in the future that may even be potentially annually.  I will discuss his case with Dr. Vasquez and if there is any in decision, his case could be discussed at the pancreaticobiliary conference.    Thank you kindly for this consultation.  Mark Judge MD    --------------------    Interval History:  Rick present for evaluation of pancreatic mass.  Epi was in his usual state of health when he began developing signs suggestive of a kidney stone, something he has dealt with previously.  He presented to the emergency room where a CT of the abdomen and pelvis revealed a 2 mm obstructing stone at the left ureteral vesicle junction nearly passed into the urinary bladder with mild secondary hydronephrosis as well as a 6 mm nonobstructing right kidney stone.  Incidentally a 3.7 x 2.8 cm mass was noted in the body of the pancreas suspicious for malignancy.  Incidental diverticulosis and spondylolysis was also noted.  A dedicated CT of the  pancreas was then followed up which revealed an approximately 3.9 cm heterogeneously enhancing and multicystic pancreatic body mass with mild upstream main pancreatic ductal dilatation abutting the proximal jejunum as well as splenic vein and splenic artery.  Several stable few prominent periportal lymph nodes were noted as well dating back to 2017, likely reactive.  Somewhat bilobed approximately 2.2 cm benign hemangioma in hepatic segment 4A was also noted.  At the time of our visit, he has undergone an EUS with biopsy pending from Dr. Brown.  He feels great and healthy.  No unexplained nausea or vomiting.  Stable weight appetite and energy.  He is enjoying his life.  Quality of life is good.  No itching or jaundice.    --------------------    Review of Systems:  10 point ROS negative except for that above.    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     COLONOSCOPY  2016 2nd colonoscopy     COLONOSCOPY WITH CO2 INSUFFLATION N/A 08/23/2016    Procedure: COLONOSCOPY WITH CO2 INSUFFLATION;  Surgeon: Duane, William Charles, MD;  Location:  OR     ENDOSCOPIC ULTRASOUND UPPER GASTROINTESTINAL TRACT (GI) N/A 6/29/2023    Procedure: ENDOSCOPIC ULTRASOUND, with needle biopsy;  Surgeon: Puma Vasquez MD;  Location: [facility] OR     OTHER SURGICAL HISTORY Left      ROTATOR CUFF REPAIR RT/LT  09/2006    Left     ROTATOR CUFF REPAIR RT/LT  2008    Right     ROTATOR CUFF REPAIR RT/LT  2010    Right - redo     VASECTOMY  1982     ZZC APPENDECTOMY         Family History:  Family History   Problem Relation Age of Onset     Prostate Cancer  Brother      Cancer Father         Thyroid     Hypertension Father      Other Cancer Father         Thyroid     Diabetes Brother      Hypertension Mother      Depression Mother      Breast Cancer Paternal Aunt      Diabetes Brother      Other Cancer Brother         Basal Cell Carcinoma     Thyroid Disease Brother         Thyroid removed     Prostate Cancer Brother         Prostate       Social History:  Social History     Socioeconomic History     Marital status:    Tobacco Use     Smoking status: Former     Packs/day: 0.00     Years: 0.00     Pack years: 0.00     Types: Cigarettes     Start date: 1964     Quit date: 8/15/1987     Years since quittin.9     Passive exposure: Past     Smokeless tobacco: Never   Vaping Use     Vaping Use: Never used   Substance and Sexual Activity     Alcohol use: Yes     Alcohol/week: 0.0 standard drinks of alcohol     Comment: 1-2 drinks every other week     Drug use: No     Sexual activity: Not Currently     Partners: Female   Other Topics Concern     Parent/sibling w/ CABG, MI or angioplasty before 65F 55M? No       Medications / Allergies:  Reviewed in EMR.    --------------------    Physical Exam:  VS: BP 99/66   Pulse 87   Temp 97.6  F (36.4  C) (Oral)   Resp 16   Wt 94.3 kg (207 lb 14.4 oz)   SpO2 97%   BMI 29.00 kg/m    GEN: Well appearing.    Labs / Imaging / Path:  We Personally reviewed his CT of the abdomen and pelvis as well as CT pancreas protocol together.  We also reviewed the CT of the abdomen pelvis from 2017 for comparison.

## 2023-07-10 ENCOUNTER — OFFICE VISIT (OUTPATIENT)
Dept: FAMILY MEDICINE | Facility: CLINIC | Age: 73
End: 2023-07-10
Payer: COMMERCIAL

## 2023-07-10 VITALS
TEMPERATURE: 98.5 F | SYSTOLIC BLOOD PRESSURE: 132 MMHG | WEIGHT: 207.6 LBS | RESPIRATION RATE: 11 BRPM | HEIGHT: 71 IN | OXYGEN SATURATION: 99 % | DIASTOLIC BLOOD PRESSURE: 78 MMHG | HEART RATE: 62 BPM | BODY MASS INDEX: 29.06 KG/M2

## 2023-07-10 DIAGNOSIS — E78.5 HYPERLIPIDEMIA LDL GOAL <70: ICD-10-CM

## 2023-07-10 DIAGNOSIS — H91.90 HEARING LOSS, UNSPECIFIED HEARING LOSS TYPE, UNSPECIFIED LATERALITY: ICD-10-CM

## 2023-07-10 DIAGNOSIS — I10 HYPERTENSION GOAL BP (BLOOD PRESSURE) < 140/90: ICD-10-CM

## 2023-07-10 DIAGNOSIS — M25.552 HIP PAIN, LEFT: ICD-10-CM

## 2023-07-10 DIAGNOSIS — E11.9 TYPE 2 DIABETES MELLITUS WITHOUT COMPLICATION, WITHOUT LONG-TERM CURRENT USE OF INSULIN (H): ICD-10-CM

## 2023-07-10 DIAGNOSIS — K86.89 PANCREATIC MASS: Primary | ICD-10-CM

## 2023-07-10 PROCEDURE — 99214 OFFICE O/P EST MOD 30 MIN: CPT | Performed by: FAMILY MEDICINE

## 2023-07-10 ASSESSMENT — PAIN SCALES - GENERAL: PAINLEVEL: NO PAIN (0)

## 2023-07-10 NOTE — PROGRESS NOTES
Assessment & Plan     Pancreatic mass  Reviewed interval history with patient including biopsy results.  Was found to have concerning pancreatic mass.  Biopsy performed during endoscopic ultrasound showed microcystic serous cystadenoma.  So now the goal is to figure out what type of monitoring is needed, likely annual imaging.  But patient obviously pleased with the results and generally feeling well.    Type 2 diabetes mellitus without complication, without long-term current use of insulin (H)  Stable on current regimen.  Continue same plan and routine follow-up.  Due for follow-up in 3 months    Hypertension goal BP (blood pressure) < 140/90  Stable on current regimen.  Continue same plan and routine follow-up.     Hyperlipidemia LDL goal <70  Stable on current regimen.  Continue same plan and routine follow-up.     Hip pain, left  Has been dealing with some ongoing left hip pain going through physical therapy.  Seems to be getting a bit better we could always consider orthopedic referral if it hits a plateau    Hearing loss, unspecified hearing loss type, unspecified laterality    - Adult Audiology  Referral; Future           MED REC REQUIRED  Post Medication Reconciliation Status:   See Patient Instructions    Keila Muñoz MD  [facility]    Jim Chowdary is a 73 year old, presenting for the following health issues:  Hospital F/U        4/14/2023     3:19 PM   Additional Questions   Roomed by [place]     [facility]         Hospital Follow-up Visit:    Hospital/Nursing Home/IP Rehab Facility: [facility]   Date of Admission: 6/22/2023  Date of Discharge: 6/22/2023  Reason(s) for Admission: Back pain/kidney stone     Was your hospitalization related to COVID-19? No   Problems taking medications regularly:  None  Medication changes since discharge: None  Problems adhering to non-medication therapy:      Summary of hospitalization:  See outside  "records, reviewed and scanned  Diagnostic Tests/Treatments reviewed.  Follow up needed:   Other Healthcare Providers Involved in Patient s Care:           Update since discharge: improved.         Plan of care communicated with patient           Here today in follow-up of recent biopsy results.  Generally doing well overall.      Review of Systems   Constitutional, HEENT, cardiovascular, pulmonary, gi and gu systems are negative, except as otherwise noted.      Objective    /78   Pulse 62   Temp 98.5  F (36.9  C) (Oral)   Resp 11   Ht 1.791 m (5' 10.5\")   Wt 94.2 kg (207 lb 9.6 oz)   SpO2 99%   BMI 29.37 kg/m    Body mass index is 29.37 kg/m .  Physical Exam   Alert, pleasant, upbeat, and in no apparent discomfort.  S1 and S2 normal, no murmurs, clicks, gallops or rubs. Regular rate and rhythm. Chest is clear; no wheezes or rales. No edema or JVD.  Past labs reviewed with the patient.   Reviewed recent imaging                    "

## 2023-07-28 ENCOUNTER — TELEPHONE (OUTPATIENT)
Dept: GASTROENTEROLOGY | Facility: CLINIC | Age: 73
End: 2023-07-28
Payer: COMMERCIAL

## 2023-07-28 NOTE — TELEPHONE ENCOUNTER
Discussed biopsy results with pt, showing serous cystadenoma in the pancreas.    This is a benign lesion and no further evaluation or surveillance is recommended.    He did well after the biopsy.    GALILEA Vasquez MD  Professor of Medicine  Division of Gastroenterology, Hepatology and Nutrition  [facility]

## 2023-08-31 LAB — UPPER EUS: NORMAL

## 2023-09-03 DIAGNOSIS — E78.5 HYPERLIPIDEMIA LDL GOAL <70: ICD-10-CM

## 2023-09-05 RX ORDER — ATORVASTATIN CALCIUM 10 MG/1
10 TABLET, FILM COATED ORAL DAILY
Qty: 90 TABLET | Refills: 0 | Status: SHIPPED | OUTPATIENT
Start: 2023-09-05 | End: 2023-12-01

## 2023-09-06 DIAGNOSIS — E11.9 TYPE 2 DIABETES MELLITUS WITHOUT COMPLICATION, WITHOUT LONG-TERM CURRENT USE OF INSULIN (H): ICD-10-CM

## 2023-09-12 ENCOUNTER — OFFICE VISIT (OUTPATIENT)
Dept: AUDIOLOGY | Facility: CLINIC | Age: 73
End: 2023-09-12
Attending: FAMILY MEDICINE
Payer: COMMERCIAL

## 2023-09-12 DIAGNOSIS — H90.3 SENSORINEURAL HEARING LOSS, ASYMMETRICAL: ICD-10-CM

## 2023-09-12 PROCEDURE — 92550 TYMPANOMETRY & REFLEX THRESH: CPT | Performed by: AUDIOLOGIST

## 2023-09-12 PROCEDURE — 92557 COMPREHENSIVE HEARING TEST: CPT | Performed by: AUDIOLOGIST

## 2023-09-12 NOTE — PROGRESS NOTES
AUDIOLOGY REPORT    SUBJECTIVE:  Rick Sandhu is a 73 year old male who was seen in the Audiology Clinic at the [facility] for audiologic evaluation, referred by Keila Muñoz M.D.  The patient reports declining hearing and a history of noise occupational and recreational noise exposure. There is also a family history of hearing loss.  Patient reports he tried [employer] hearing aids several years ago which he felt did not benefit him. The patient denies  bilateral tinnitus, bilateral otalgia, bilateral drainage, and bilateral aural fullness.  The patient notes difficulty with communication in a variety of listening situations.  They were unaccompanied today.    OBJECTIVE:  Abuse Screening:  Do you feel unsafe at home or work/school? No  Do you feel threatened by someone? No  Does anyone try to keep you from having contact with others, or doing things outside of your home? No  Physical signs of abuse present? No     Fall Risk Screen:  1. Have you fallen two or more times in the past year? No  2. Have you fallen and had an injury in the past year? No    Timed Up and Go Score (in seconds): not tested  Is patient a fall risk? No  Referral initiated: No  Fall Risk Assessment Completed by Audiology    Otoscopic exam indicates ears are clear of cerumen bilaterally     Pure Tone Thresholds assessed using conventional audiometry with good  reliability from 250-8000 Hz bilaterally using insert earphones and circumaural headphones     RIGHT:  normal and mild from 250-1500 Hz sloping to severe and profound sensorineural hearing loss    LEFT:    normal and mild from 250-1000 Hz sloping to severe and profound sensorineural hearing loss    Tympanogram:    RIGHT: normal eardrum mobility    LEFT:   normal eardrum mobility    Reflexes (reported by stimulus ear):  RIGHT: Ipsilateral is elevated at frequencies tested  RIGHT: Contralateral is elevated at frequencies tested  LEFT:   Ipsilateral is  present at normal levels  LEFT:   Contralateral is absent at frequencies tested      Speech Reception Threshold:    RIGHT: 15 dB HL    LEFT:   30 dB HL    Speech Reception Thresholds are in good agreement with pure tone thresholds.    Word Recognition Score:     RIGHT: 92% at 80 dB HL using NU-6 recorded word list.    LEFT:   80% at 80 dB HL using NU-6 recorded word list.      ASSESSMENT:     ICD-10-CM    1. Sensorineural hearing loss, asymmetrical  H90.3 Adult Audiology  Referral          Today s results were discussed with the patient in detail.     PLAN:  Patient was counseled regarding hearing loss and impact on communication.  Patient is a good candidate for amplification at this time.  Handout on good communication strategies, and hearing aid use was given to patient. It is recommended that the patient schedule an ENT visit to rule out retrocochlear pathology. I also recommend a hearing aid consultation leading to binaural hearing aids with custom earmolds.  Please call this clinic with questions regarding these results or recommendations.          Juan F Bey MA, CCC-A  [place] Licensed Audiologist #[id number]  9/12/2023

## 2023-10-08 DIAGNOSIS — E11.9 TYPE 2 DIABETES MELLITUS WITHOUT COMPLICATION, WITHOUT LONG-TERM CURRENT USE OF INSULIN (H): ICD-10-CM

## 2023-10-08 DIAGNOSIS — I10 ESSENTIAL HYPERTENSION WITH GOAL BLOOD PRESSURE LESS THAN 140/90: ICD-10-CM

## 2023-10-09 RX ORDER — GLIPIZIDE 5 MG/1
5 TABLET, FILM COATED, EXTENDED RELEASE ORAL DAILY
Qty: 90 TABLET | Refills: 1 | Status: SHIPPED | OUTPATIENT
Start: 2023-10-09 | End: 2024-04-01

## 2023-10-09 RX ORDER — LISINOPRIL AND HYDROCHLOROTHIAZIDE 20; 25 MG/1; MG/1
1 TABLET ORAL DAILY
Qty: 90 TABLET | Refills: 1 | Status: SHIPPED | OUTPATIENT
Start: 2023-10-09 | End: 2024-03-27

## 2023-12-01 DIAGNOSIS — E78.5 HYPERLIPIDEMIA LDL GOAL <70: ICD-10-CM

## 2023-12-01 DIAGNOSIS — E11.9 TYPE 2 DIABETES MELLITUS WITHOUT COMPLICATION, WITHOUT LONG-TERM CURRENT USE OF INSULIN (H): ICD-10-CM

## 2023-12-01 RX ORDER — ATORVASTATIN CALCIUM 10 MG/1
10 TABLET, FILM COATED ORAL DAILY
Qty: 90 TABLET | Refills: 0 | Status: SHIPPED | OUTPATIENT
Start: 2023-12-01 | End: 2024-02-29

## 2023-12-03 ENCOUNTER — HEALTH MAINTENANCE LETTER (OUTPATIENT)
Age: 73
End: 2023-12-03

## 2023-12-07 ENCOUNTER — VIRTUAL VISIT (OUTPATIENT)
Dept: FAMILY MEDICINE | Facility: CLINIC | Age: 73
End: 2023-12-07
Attending: FAMILY MEDICINE
Payer: COMMERCIAL

## 2023-12-07 DIAGNOSIS — E78.5 HYPERLIPIDEMIA LDL GOAL <70: ICD-10-CM

## 2023-12-07 DIAGNOSIS — H90.3 ASYMMETRICAL SENSORINEURAL HEARING LOSS: ICD-10-CM

## 2023-12-07 DIAGNOSIS — I10 HYPERTENSION GOAL BP (BLOOD PRESSURE) < 140/90: ICD-10-CM

## 2023-12-07 DIAGNOSIS — E11.9 TYPE 2 DIABETES MELLITUS WITHOUT COMPLICATION, WITHOUT LONG-TERM CURRENT USE OF INSULIN (H): Primary | ICD-10-CM

## 2023-12-07 PROCEDURE — 99214 OFFICE O/P EST MOD 30 MIN: CPT | Mod: 95 | Performed by: FAMILY MEDICINE

## 2023-12-07 NOTE — PROGRESS NOTES
"Epi is a 73 year old who is being evaluated via a billable telephone visit.      What phone number would you like to be contacted at? [phone]   How would you like to obtain your AVS? Ketty    Distant Location (provider location):  Off-site    Assessment & Plan     Type 2 diabetes mellitus without complication, without long-term current use of insulin (H)  Due for recheck of lab work and adjustment accordingly    Hypertension goal BP (blood pressure) < 140/90  Stable on current regimen.  Continue same plan and routine follow-up.     Hyperlipidemia LDL goal <70  Recheck and adjust accordingly    Asymmetrical sensorineural hearing loss  Was seen by audiology a few months ago and his hearing loss is asymmetrical.  Recommended follow-up with ENT so I am happy to help set that up.  Records reviewed in his chart.  - Adult ENT  Referral; Future         BMI:   Estimated body mass index is 29.37 kg/m  as calculated from the following:    Height as of 7/10/23: 1.791 m (5' 10.5\").    Weight as of 7/10/23: 94.2 kg (207 lb 9.6 oz).       See Patient Instructions    Keila Muñoz MD  [facility]   Epi is a 73 year old, presenting for the following health issues:  Recheck Medication (Prescription Review and Renewals/)        12/7/2023     8:39 AM   Additional Questions   Roomed by Mitali JOHNSON   Accompanied by Self       History of Present Illness       Diabetes:   He presents for follow up of diabetes.  He is checking home blood glucose a few times a month.   He checks blood glucose after meals.  Blood glucose is sometimes over 200 and never under 70. He is aware of hypoglycemia symptoms including none.    He has no concerns regarding his diabetes at this time.   He is not experiencing numbness or burning in feet, excessive thirst, blurry vision, weight changes or redness, sores or blisters on feet.           Hyperlipidemia:  He presents for follow up of hyperlipidemia.   " He is taking medication to lower cholesterol. He is not having myalgia or other side effects to statin medications.    Hypertension: He presents for follow up of hypertension.  He does not check blood pressure  regularly outside of the clinic. Outside blood pressures have been over 140/90. He does not follow a low salt diet.     He eats 2-3 servings of fruits and vegetables daily.He consumes 0 sweetened beverage(s) daily.He exercises with enough effort to increase his heart rate 9 or less minutes per day.  He exercises with enough effort to increase his heart rate 3 or less days per week.   He is taking medications regularly.       Medication Followup of All Current Prescriptions   Taking Medication as prescribed: yes  Side Effects:  None  Medication Helping Symptoms:  Yes     Visit with patient today to follow-up on diabetes, hypertension, lipids.  Also continues to have issues with hearing.      Review of Systems   Constitutional, HEENT, cardiovascular, pulmonary, gi and gu systems are negative, except as otherwise noted.      Objective           Vitals:  No vitals were obtained today due to virtual visit.    Physical Exam   healthy, alert, and no distress  PSYCH: Alert and oriented times 3; coherent speech, normal   rate and volume, able to articulate logical thoughts, able   to abstract reason, no tangential thoughts, no hallucinations   or delusions  His affect is normal  RESP: No cough, no audible wheezing, able to talk in full sentences  Remainder of exam unable to be completed due to telephone visits    Past labs reviewed with the patient.             Phone call duration: 11 minutes

## 2023-12-19 ENCOUNTER — LAB (OUTPATIENT)
Dept: LAB | Facility: CLINIC | Age: 73
End: 2023-12-19
Payer: COMMERCIAL

## 2023-12-19 DIAGNOSIS — E11.9 TYPE 2 DIABETES MELLITUS WITHOUT COMPLICATION, WITHOUT LONG-TERM CURRENT USE OF INSULIN (H): Primary | ICD-10-CM

## 2023-12-19 DIAGNOSIS — I10 HYPERTENSION GOAL BP (BLOOD PRESSURE) < 140/90: ICD-10-CM

## 2023-12-19 LAB
ALBUMIN SERPL BCG-MCNC: 4.5 G/DL (ref 3.5–5.2)
ALP SERPL-CCNC: 59 U/L (ref 40–150)
ALT SERPL W P-5'-P-CCNC: 44 U/L (ref 0–70)
ANION GAP SERPL CALCULATED.3IONS-SCNC: 11 MMOL/L (ref 7–15)
AST SERPL W P-5'-P-CCNC: 31 U/L (ref 0–45)
BILIRUB SERPL-MCNC: 0.9 MG/DL
BUN SERPL-MCNC: 14.3 MG/DL (ref 8–23)
CALCIUM SERPL-MCNC: 9.7 MG/DL (ref 8.8–10.2)
CHLORIDE SERPL-SCNC: 97 MMOL/L (ref 98–107)
CHOLEST SERPL-MCNC: 165 MG/DL
CREAT SERPL-MCNC: 0.84 MG/DL (ref 0.67–1.17)
CREAT UR-MCNC: 198 MG/DL
DEPRECATED HCO3 PLAS-SCNC: 28 MMOL/L (ref 22–29)
EGFRCR SERPLBLD CKD-EPI 2021: >90 ML/MIN/1.73M2
FASTING STATUS PATIENT QL REPORTED: YES
GLUCOSE SERPL-MCNC: 231 MG/DL (ref 70–99)
HBA1C MFR BLD: 8.5 % (ref 0–5.6)
HDLC SERPL-MCNC: 39 MG/DL
LDLC SERPL CALC-MCNC: 74 MG/DL
MICROALBUMIN UR-MCNC: 21.7 MG/L
MICROALBUMIN/CREAT UR: 10.96 MG/G CR (ref 0–17)
NONHDLC SERPL-MCNC: 126 MG/DL
POTASSIUM SERPL-SCNC: 4.1 MMOL/L (ref 3.4–5.3)
PROT SERPL-MCNC: 7.2 G/DL (ref 6.4–8.3)
SODIUM SERPL-SCNC: 136 MMOL/L (ref 135–145)
TRIGL SERPL-MCNC: 261 MG/DL

## 2023-12-19 PROCEDURE — 82043 UR ALBUMIN QUANTITATIVE: CPT

## 2023-12-19 PROCEDURE — 80053 COMPREHEN METABOLIC PANEL: CPT

## 2023-12-19 PROCEDURE — 82570 ASSAY OF URINE CREATININE: CPT

## 2023-12-19 PROCEDURE — 80061 LIPID PANEL: CPT

## 2023-12-19 PROCEDURE — 36415 COLL VENOUS BLD VENIPUNCTURE: CPT

## 2023-12-19 PROCEDURE — 83036 HEMOGLOBIN GLYCOSYLATED A1C: CPT

## 2023-12-20 NOTE — RESULT ENCOUNTER NOTE
Epi Johnson,  Overall your lab work looks good but that hemoglobin A1c has skyrocketed.  Almost a full point higher than earlier this year.  So is this due to change in diet and exercise, or have you changed how you are taking the medicines, or what do you think?  MARIELLE Muñoz M.D.

## 2023-12-27 ENCOUNTER — TELEPHONE (OUTPATIENT)
Dept: FAMILY MEDICINE | Facility: CLINIC | Age: 73
End: 2023-12-27
Payer: COMMERCIAL

## 2023-12-27 NOTE — TELEPHONE ENCOUNTER
Patient Quality Outreach    Patient is due for the following:   Diabetes -  Foot Exam  Physical Annual Wellness Visit      Topic Date Due    Zoster (Shingles) Vaccine (1 of 2) Never done    Pneumococcal Vaccine (3 of 3 - PPSV23 or PCV20) 02/22/2018    Flu Vaccine (1) 09/01/2023    COVID-19 Vaccine (4 - 2023-24 season) 09/01/2023       Next Steps:   Schedule a Annual Wellness Visit    Type of outreach:    Sent [employer] message.    Next Steps:  Reach out within 90 days via [employer].    Max number of attempts reached: No. Will try again in 90 days if patient still on fail list.    Questions for provider review:    None           Teresita Crespo MA

## 2024-02-29 DIAGNOSIS — E11.9 TYPE 2 DIABETES MELLITUS WITHOUT COMPLICATION, WITHOUT LONG-TERM CURRENT USE OF INSULIN (H): ICD-10-CM

## 2024-02-29 DIAGNOSIS — E78.5 HYPERLIPIDEMIA LDL GOAL <70: ICD-10-CM

## 2024-02-29 RX ORDER — ATORVASTATIN CALCIUM 10 MG/1
10 TABLET, FILM COATED ORAL DAILY
Qty: 90 TABLET | Refills: 1 | Status: SHIPPED | OUTPATIENT
Start: 2024-02-29 | End: 2024-06-25

## 2024-03-06 ENCOUNTER — TELEPHONE (OUTPATIENT)
Dept: FAMILY MEDICINE | Facility: CLINIC | Age: 74
End: 2024-03-06
Payer: COMMERCIAL

## 2024-03-26 ENCOUNTER — OFFICE VISIT (OUTPATIENT)
Dept: OTOLARYNGOLOGY | Facility: CLINIC | Age: 74
End: 2024-03-26
Payer: COMMERCIAL

## 2024-03-26 DIAGNOSIS — J32.0 CHRONIC LEFT MAXILLARY SINUSITIS: Primary | ICD-10-CM

## 2024-03-26 DIAGNOSIS — J32.0 OROANTRAL FISTULA: ICD-10-CM

## 2024-03-26 DIAGNOSIS — H90.3 ASYMMETRICAL SENSORINEURAL HEARING LOSS: ICD-10-CM

## 2024-03-26 PROCEDURE — 99203 OFFICE O/P NEW LOW 30 MIN: CPT | Performed by: OTOLARYNGOLOGY

## 2024-03-26 ASSESSMENT — ENCOUNTER SYMPTOMS
RESPIRATORY NEGATIVE: 1
GASTROINTESTINAL NEGATIVE: 1
EYES NEGATIVE: 1
CONSTITUTIONAL NEGATIVE: 1

## 2024-03-26 NOTE — LETTER
3/26/2024         RE: Rick Sandhu  [address] [facility] [address]        Dear Colleague,    Thank you for referring your patient, Rick Sandhu, to the [facility]. Please see a copy of my visit note below.    Chief Complaint   Patient presents with     Consult     Left nostril plugged, also states left eye is full of sand in am. Pain in Left cheek. Had a tooth extraction and told to see ENT?   PND constant, uses neti pot in winter.       PCP: Keila Muñoz     Referring Provider: Keila Muñoz    There were no vitals taken for this visit.    ENT Problem List:  Patient Active Problem List   Diagnosis Code     Hyperlipidemia LDL goal <70 E78.5     Rosacea L71.9     Sarcoidosis D86.9     Advanced directives, counseling/discussion Z71.89     Posterior vitreous detachment, bilateral H43.813     Hypertension goal BP (blood pressure) < 140/90 I10     Type 2 diabetes mellitus without complication, without long-term current use of insulin (H) E11.9     Combined forms of age-related cataract, mild-mod, of both eyes H25.813     Pancreatic mass K86.89      Current Medications:  Current Outpatient Medications   Medication     ASPIRIN 81 MG PO TABS     atorvastatin (LIPITOR) 10 MG tablet     glipiZIDE (GLUCOTROL XL) 5 MG 24 hr tablet     lisinopril-hydrochlorothiazide (ZESTORETIC) 20-25 MG tablet     metFORMIN (GLUCOPHAGE) 500 MG tablet     vitamin D3 (CHOLECALCIFEROL) 50 mcg (2000 units) tablet     Zinc 50 MG CAPS     No current facility-administered medications for this visit.     HPI  Pleasant 74 year old male presents today as a(n) new patient for his left nostril being plugged, left eye being full of sand and crustiness in am, constant post nasal drip, and pain in his left cheek for the past month. He had a tooth extraction and was told to see ENT. He uses neti pot in winter. He sneezes often and goes through a lot of Kleenex. He states that his mother had  problems with her sinuses and had to have polyps removed. 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    He denies allergies.     Review of Systems   Constitutional: Negative.    HENT:  Positive for congestion.    Eyes: Negative.    Respiratory: Negative.     Gastrointestinal: Negative.    Skin: Negative.    Endo/Heme/Allergies: Negative.  Negative for environmental allergies.       Physical Exam  Vitals and nursing note reviewed.   Constitutional:       Appearance: Normal appearance.   HENT:      Head: Normocephalic and atraumatic.      Jaw: There is normal jaw occlusion.      Right Ear: Hearing, tympanic membrane and ear canal normal.      Left Ear: Hearing, tympanic membrane and ear canal normal.      Nose: Septal deviation present. No mucosal edema, congestion or rhinorrhea.      Right Nostril: No occlusion.      Left Nostril: No occlusion.      Right Turbinates: Not enlarged or swollen.      Left Turbinates: Swollen. Not enlarged.      Right Sinus: No maxillary sinus tenderness or frontal sinus tenderness.      Left Sinus: No maxillary sinus tenderness or frontal sinus tenderness.      Comments: Evidence of sinus infection in left nostril     Mouth/Throat:      Mouth: Mucous membranes are moist.      Pharynx: Oropharynx is clear. Uvula midline.   Eyes:      Extraocular Movements: Extraocular movements intact.      Pupils: Pupils are equal, round, and reactive to light.   Neurological:      Mental Status: He is alert.       A/P  This pleasant patient has chronic inflammation and recurrent sinus infections in his left nostril. A CT sinus w/o contrast is ordered for further investigation. He will receive a phone call to discuss the results of this test and future potential treatment options.    Follow up in clinic as needed.    Scribe/Staff:    Scribe Disclosure:   CHELSEA, Petra Titus, am serving as a scribe; to document services personally performed  by Corey Sherman MD based on data collection and the provider's statements to me.     Provider Disclosure:  I agree with above History, Review of Systems, Physical exam and Plan.  I have reviewed the content of the documentation and have edited it as needed. I have personally performed the services documented here and the documentation accurately represents those services and the decisions I have made.      Electronically signed by:  Corey Sherman MD       Again, thank you for allowing me to participate in the care of your patient.        Sincerely,        Corey Sherman MD

## 2024-03-26 NOTE — NURSING NOTE
Rick Sandhu's chief complaint for this visit includes:  Chief Complaint   Patient presents with    Consult     Left nostril plugged, also states left eye is full of sand in am. Pain in Left cheek. Had a tooth extraction and told to see ENT?   PND constant, uses neti pot in winter.      PCP: Keila Muoñz    Referring Provider:  Keila Muñoz MD  [address] [place] [address]    There were no vitals taken for this visit.

## 2024-03-26 NOTE — PROGRESS NOTES
Chief Complaint   Patient presents with    Consult     Left nostril plugged, also states left eye is full of sand in am. Pain in Left cheek. Had a tooth extraction and told to see ENT?   PND constant, uses neti pot in winter.       PCP: Keila Muñoz     Referring Provider: Keila Muñoz    There were no vitals taken for this visit.    ENT Problem List:  Patient Active Problem List   Diagnosis Code    Hyperlipidemia LDL goal <70 E78.5    Rosacea L71.9    Sarcoidosis D86.9    Advanced directives, counseling/discussion Z71.89    Posterior vitreous detachment, bilateral H43.813    Hypertension goal BP (blood pressure) < 140/90 I10    Type 2 diabetes mellitus without complication, without long-term current use of insulin (H) E11.9    Combined forms of age-related cataract, mild-mod, of both eyes H25.813    Pancreatic mass K86.89      Current Medications:  Current Outpatient Medications   Medication    ASPIRIN 81 MG PO TABS    atorvastatin (LIPITOR) 10 MG tablet    glipiZIDE (GLUCOTROL XL) 5 MG 24 hr tablet    lisinopril-hydrochlorothiazide (ZESTORETIC) 20-25 MG tablet    metFORMIN (GLUCOPHAGE) 500 MG tablet    vitamin D3 (CHOLECALCIFEROL) 50 mcg (2000 units) tablet    Zinc 50 MG CAPS     No current facility-administered medications for this visit.     HPI  Pleasant 74 year old male presents today as a(n) new patient for his left nostril being plugged, left eye being full of sand and crustiness in am, constant post nasal drip, and pain in his left cheek for the past month. He had a tooth extraction and was told to see ENT. He uses neti pot in winter. He sneezes often and goes through a lot of Kleenex. He states that his mother had problems with her sinuses and had to have polyps removed. 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    He denies allergies.     Review of Systems   Constitutional: Negative.    HENT:  Positive for congestion.     Eyes: Negative.    Respiratory: Negative.     Gastrointestinal: Negative.    Skin: Negative.    Endo/Heme/Allergies: Negative.  Negative for environmental allergies.       Physical Exam  Vitals and nursing note reviewed.   Constitutional:       Appearance: Normal appearance.   HENT:      Head: Normocephalic and atraumatic.      Jaw: There is normal jaw occlusion.      Right Ear: Hearing, tympanic membrane and ear canal normal.      Left Ear: Hearing, tympanic membrane and ear canal normal.      Nose: Septal deviation present. No mucosal edema, congestion or rhinorrhea.      Right Nostril: No occlusion.      Left Nostril: No occlusion.      Right Turbinates: Not enlarged or swollen.      Left Turbinates: Swollen. Not enlarged.      Right Sinus: No maxillary sinus tenderness or frontal sinus tenderness.      Left Sinus: No maxillary sinus tenderness or frontal sinus tenderness.      Comments: Evidence of sinus infection in left nostril     Mouth/Throat:      Mouth: Mucous membranes are moist.      Pharynx: Oropharynx is clear. Uvula midline.   Eyes:      Extraocular Movements: Extraocular movements intact.      Pupils: Pupils are equal, round, and reactive to light.   Neurological:      Mental Status: He is alert.       A/P  This pleasant patient has chronic inflammation and recurrent sinus infections in his left nostril. A CT sinus w/o contrast is ordered for further investigation. He will receive a phone call to discuss the results of this test and future potential treatment options. His hearing test from September 12th 2023 was reviewed. He has bilateral sensorineural hearing loss. He is a candidate for hearing aid evaluation.     Follow up in clinic as needed.    Scribe/Staff:    Scribe Disclosure:   I, Petra Titus, am serving as a scribe; to document services personally performed by Corey Sherman MD based on data collection and the provider's statements to me.     Provider Disclosure:  I agree with  above History, Review of Systems, Physical exam and Plan.  I have reviewed the content of the documentation and have edited it as needed. I have personally performed the services documented here and the documentation accurately represents those services and the decisions I have made.      Electronically signed by:  Corey Sherman MD

## 2024-03-27 DIAGNOSIS — I10 ESSENTIAL HYPERTENSION WITH GOAL BLOOD PRESSURE LESS THAN 140/90: ICD-10-CM

## 2024-03-27 RX ORDER — LISINOPRIL AND HYDROCHLOROTHIAZIDE 20; 25 MG/1; MG/1
1 TABLET ORAL DAILY
Qty: 90 TABLET | Refills: 1 | Status: SHIPPED | OUTPATIENT
Start: 2024-03-27 | End: 2024-09-13

## 2024-03-29 ENCOUNTER — OFFICE VISIT (OUTPATIENT)
Dept: AUDIOLOGY | Facility: CLINIC | Age: 74
End: 2024-03-29
Payer: COMMERCIAL

## 2024-03-29 DIAGNOSIS — H90.3 SENSORINEURAL HEARING LOSS, ASYMMETRICAL: Primary | ICD-10-CM

## 2024-03-29 PROCEDURE — 92591 PR HEARING AID EXAM BINAURAL: CPT

## 2024-03-29 NOTE — PROGRESS NOTES
AUDIOLOGY REPORT    SUBJECTIVE: Rick Sandhu is a 74 year old male was seen in the Audiology Clinic at  [facility] [place] on 3/29/24 to discuss concerns with hearing and functional communication difficulties. Rick has been seen previously on 9/12/2023, and results revealed a an asymmetrical sensorineural hearing loss. Rick notes difficulty with communication in a variety of listening situations.    *Epi has previously worn [employer] hearing aids    OBJECTIVE:    Patient is a hearing aid candidate. Patient would like to move forward with a hearing aid evaluation today. Therefore, the patient was presented with different options for amplification to help aid in communication. Discussed styles, levels of technology and monaural vs. binaural fitting.     The hearing aid(s) mutually chosen were:  Binaural: Phonak Audeo L50-RL IKE (waterproof)  COLOR: P5  BATTERY SIZE: rechargeable  EARMOLD/TIPS: medium open  CANAL/ LENGTH: 2M      ASSESSMENT:   Reviewed purchase information and warranty information with patient. The 45 day trial period was explained to patient. The patient was given a copy of the [place] [employer] consumer brochure on purchasing hearing instruments. Patient risk factors have been provided to the patient in writing prior to the sale of the hearing aid per FDA regulation. The risk factors are also available in the User Instructional Booklet to be presented on the day of the hearing aid fitting. Hearing aid(s) ordered. Hearing aid evaluation completed.    PLAN: Rick is scheduled to return in 2-3 weeks for a hearing aid fitting and programming. Purchase agreement will be completed on that date. Please contact this clinic with any questions or concerns.    *Patient will need to gain medical clearance prior to hearing aid fitting    Elton Murphy.CCC-A  Licensed Audiologist  [place] #[id number]     3/29/41025

## 2024-03-30 DIAGNOSIS — E11.9 TYPE 2 DIABETES MELLITUS WITHOUT COMPLICATION, WITHOUT LONG-TERM CURRENT USE OF INSULIN (H): ICD-10-CM

## 2024-04-01 RX ORDER — GLIPIZIDE 5 MG/1
5 TABLET, FILM COATED, EXTENDED RELEASE ORAL DAILY
Qty: 90 TABLET | Refills: 0 | Status: SHIPPED | OUTPATIENT
Start: 2024-04-01 | End: 2024-06-25

## 2024-04-03 ENCOUNTER — ANCILLARY PROCEDURE (OUTPATIENT)
Dept: CT IMAGING | Facility: CLINIC | Age: 74
End: 2024-04-03
Attending: OTOLARYNGOLOGY
Payer: COMMERCIAL

## 2024-04-03 DIAGNOSIS — J32.0 CHRONIC LEFT MAXILLARY SINUSITIS: ICD-10-CM

## 2024-04-03 DIAGNOSIS — J32.0 OROANTRAL FISTULA: ICD-10-CM

## 2024-04-03 PROCEDURE — 70486 CT MAXILLOFACIAL W/O DYE: CPT | Performed by: RADIOLOGY

## 2024-05-08 ENCOUNTER — OFFICE VISIT (OUTPATIENT)
Dept: OPHTHALMOLOGY | Facility: CLINIC | Age: 74
End: 2024-05-08
Payer: COMMERCIAL

## 2024-05-08 DIAGNOSIS — H43.813 POSTERIOR VITREOUS DETACHMENT, BILATERAL: ICD-10-CM

## 2024-05-08 DIAGNOSIS — H25.813 COMBINED FORMS OF AGE-RELATED CATARACT OF BOTH EYES: ICD-10-CM

## 2024-05-08 DIAGNOSIS — L71.9 ROSACEA: ICD-10-CM

## 2024-05-08 DIAGNOSIS — E11.9 TYPE 2 DIABETES MELLITUS WITHOUT COMPLICATION, WITHOUT LONG-TERM CURRENT USE OF INSULIN (H): Primary | ICD-10-CM

## 2024-05-08 DIAGNOSIS — H52.4 PRESBYOPIA: ICD-10-CM

## 2024-05-08 DIAGNOSIS — E11.3299 BACKGROUND DIABETIC RETINOPATHY (H): ICD-10-CM

## 2024-05-08 DIAGNOSIS — Z01.01 ENCOUNTER FOR EXAMINATION OF EYES AND VISION WITH ABNORMAL FINDINGS: ICD-10-CM

## 2024-05-08 PROCEDURE — 92014 COMPRE OPH EXAM EST PT 1/>: CPT | Performed by: OPHTHALMOLOGY

## 2024-05-08 PROCEDURE — 92015 DETERMINE REFRACTIVE STATE: CPT | Performed by: OPHTHALMOLOGY

## 2024-05-08 ASSESSMENT — REFRACTION_WEARINGRX
OS_CYLINDER: +1.75
OD_CYLINDER: +1.25
SPECS_TYPE: PAL
OD_ADD: +2.75
OS_SPHERE: -5.25
OD_SPHERE: -6.50
OD_AXIS: 062
OS_ADD: +2.75
OS_AXIS: 115

## 2024-05-08 ASSESSMENT — VISUAL ACUITY
OD_PH_CC+: -1
CORRECTION_TYPE: GLASSES
OS_CC+: -2
OS_CC: 20/30
OD_CC+: +2
METHOD: SNELLEN - LINEAR
OD_PH_CC: 20/30
OD_CC: 20/50

## 2024-05-08 ASSESSMENT — REFRACTION_MANIFEST
OS_SPHERE: -5.25
OD_AXIS: 065
OD_CYLINDER: +1.50
OD_ADD: +2.75
OD_SPHERE: -5.75
OS_ADD: +2.75
OS_AXIS: 117
OS_CYLINDER: +2.00

## 2024-05-08 ASSESSMENT — CONF VISUAL FIELD
OS_INFERIOR_TEMPORAL_RESTRICTION: 0
OS_SUPERIOR_TEMPORAL_RESTRICTION: 0
OS_INFERIOR_NASAL_RESTRICTION: 0
OD_INFERIOR_TEMPORAL_RESTRICTION: 0
OD_NORMAL: 1
OD_SUPERIOR_TEMPORAL_RESTRICTION: 0
METHOD: COUNTING FINGERS
OS_SUPERIOR_NASAL_RESTRICTION: 0
OS_NORMAL: 1
OD_SUPERIOR_NASAL_RESTRICTION: 0
OD_INFERIOR_NASAL_RESTRICTION: 0

## 2024-05-08 ASSESSMENT — TONOMETRY
OD_IOP_MMHG: 13
IOP_METHOD: APPLANATION
OS_IOP_MMHG: 12

## 2024-05-08 ASSESSMENT — EXTERNAL EXAM - LEFT EYE: OS_EXAM: 2+ BROW PTOSIS, ROSACEA

## 2024-05-08 ASSESSMENT — CUP TO DISC RATIO
OS_RATIO: 0.3
OD_RATIO: 0.3

## 2024-05-08 ASSESSMENT — EXTERNAL EXAM - RIGHT EYE: OD_EXAM: 2+ BROW PTOSIS, ROSACEA

## 2024-05-08 NOTE — PATIENT INSTRUCTIONS
"Glasses prescription given - optional    May use artificial tears up to four times a day (like Refresh Optive, Systane Balance, or TheraTears. Avoid \"get the red out\" drops and generic artifical tears).      Call in January 2025 for an appointment in May 2025 for Complete Exam    Dr. Bedolla [phone]      Patient Education   Diabetes weakens the blood vessels all over the body, including the eyes. Damage to the blood vessels in the eyes can cause swelling or bleeding into part of the eye (called the retina). This is called diabetic retinopathy (MEGAN-tin--pu-thee). If not treated, this disease can cause vision loss or blindness.   Symptoms may include blurred or distorted vision, but many people have no symptoms. It's important to see your eye doctor regularly to check for problems.   Early treatment and good control can help protect your vision. Here are the things you can do to help prevent vision loss:      1. Keep your blood sugar levels under tight control.      2. Bring high blood pressure under control.      3. No smoking.      4. Have yearly dilated eye exams.      "

## 2024-05-08 NOTE — LETTER
"    5/8/2024         RE: Rick Sandhu  [address] [facility] [address]        Dear Colleague,    Thank you for referring your patient, Rick Sandhu, to the [facility]. Please see a copy of my visit note below.     Current Eye Medications:  none     Subjective:  diabetic, dilated exam - harder to see smaller print, last updated 2 yrs ago. Gets occasional tearing, not often. Does not use drops.    Type II DM - oral med controlled.    Lab Results   Component Value Date    A1C 8.5 12/19/2023    A1C 7.6 04/14/2023    A1C 7.1 10/24/2022    A1C 7.2 02/16/2022    A1C 6.8 06/22/2021    A1C 5.8 09/21/2020    A1C 8.7 03/03/2020    A1C 7.8 08/27/2019    A1C 7.7 03/11/2019     Mostly satisfied with current visual status.     Objective:  See Ophthalmology Exam.       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      ICD-10-CM    1. Type 2 diabetes mellitus without complication, without long-term current use of insulin (H)  E11.9       2. Background diabetic retinopathy, mild, os  E11.3299       3. Combined forms of age-related cataract, mild-mod, of both eyes  H25.813       4. Rosacea  L71.9       5. Posterior vitreous detachment, bilateral  H43.813       6. Encounter for examination of eyes and vision with abnormal findings  Z01.01       7. Presbyopia  H52.4            Plan:  Glasses prescription given - optional    May use artificial tears up to four times a day (like Refresh Optive, Systane Balance, or TheraTears. Avoid \"get the red out\" drops and generic artifical tears).      Call in January 2025 for an appointment in May 2025 for Complete Exam    Dr. Bedolla [phone]           Again, thank you for allowing me to participate in the care of your patient.        Sincerely,        Man Bedolla MD  "

## 2024-05-08 NOTE — PROGRESS NOTES
" Current Eye Medications:  none     Subjective:  diabetic, dilated exam - harder to see smaller print, last updated 2 yrs ago. Gets occasional tearing, not often. Does not use drops.    Type II DM - oral med controlled.    Lab Results   Component Value Date    A1C 8.5 12/19/2023    A1C 7.6 04/14/2023    A1C 7.1 10/24/2022    A1C 7.2 02/16/2022    A1C 6.8 06/22/2021    A1C 5.8 09/21/2020    A1C 8.7 03/03/2020    A1C 7.8 08/27/2019    A1C 7.7 03/11/2019     Mostly satisfied with current visual status.     Objective:  See Ophthalmology Exam.       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      ICD-10-CM    1. Type 2 diabetes mellitus without complication, without long-term current use of insulin (H)  E11.9       2. Background diabetic retinopathy, mild, os  E11.3299       3. Combined forms of age-related cataract, mild-mod, of both eyes  H25.813       4. Rosacea  L71.9       5. Posterior vitreous detachment, bilateral  H43.813       6. Encounter for examination of eyes and vision with abnormal findings  Z01.01       7. Presbyopia  H52.4            Plan:  Glasses prescription given - optional    May use artificial tears up to four times a day (like Refresh Optive, Systane Balance, or TheraTears. Avoid \"get the red out\" drops and generic artifical tears).      Call in January 2025 for an appointment in May 2025 for Complete Exam    Dr. Bedolla [phone]         "

## 2024-05-09 PROBLEM — E11.3299 BACKGROUND DIABETIC RETINOPATHY (H): Status: ACTIVE | Noted: 2024-05-09

## 2024-05-30 DIAGNOSIS — E11.9 TYPE 2 DIABETES MELLITUS WITHOUT COMPLICATION, WITHOUT LONG-TERM CURRENT USE OF INSULIN (H): ICD-10-CM

## 2024-06-04 ENCOUNTER — OFFICE VISIT (OUTPATIENT)
Dept: AUDIOLOGY | Facility: CLINIC | Age: 74
End: 2024-06-04
Payer: COMMERCIAL

## 2024-06-04 DIAGNOSIS — H90.3 BILATERAL SENSORINEURAL HEARING LOSS: Primary | ICD-10-CM

## 2024-06-04 PROCEDURE — V5261 HEARING AID, DIGIT, BIN, BTE: HCPCS | Performed by: AUDIOLOGIST

## 2024-06-04 PROCEDURE — V5011 HEARING AID FITTING/CHECKING: HCPCS | Performed by: AUDIOLOGIST

## 2024-06-04 PROCEDURE — V5160 DISPENSING FEE BINAURAL: HCPCS | Performed by: AUDIOLOGIST

## 2024-06-04 PROCEDURE — V5020 CONFORMITY EVALUATION: HCPCS | Mod: RT | Performed by: AUDIOLOGIST

## 2024-06-04 NOTE — PROGRESS NOTES
AUDIOLOGY REPORT    SUBJECTIVE: Rick Sandhu, a 74 year old male, was seen in the Audiology Clinic at [facility] today for a Binaural hearing aid fitting. Previous results have revealed a bilateral asymmetric sensorineural hearing loss. The patient was given medical clearance to pursue amplification by  MARIELLE Sherman MD. Patient was unaccompanied to today's visit.     OBJECTIVE:  Prior to fitting, a hearing aid check was performed to ensure device functionality. The hearing aid conformity evaluation was completed.The hearing aids were placed and they provided a good fit. Real-ear-probe-microphone measurements were completed on the [employer] system and were a tolerable match to NAL-NL2 target with soft sounds audible, moderate sounds comfortable, and loud sounds below discomfort. UCLs are verified through maximum power output measures and demonstrate appropriate limiting of loud inputs. Mr. Sandhu was oriented to proper hearing aid use, care, cleaning (no water, dry brush), batteries (size rechargeable, insertion/removal, toxicity, low-battery signal), aid insertion/removal, user booklet, warranty information, storage cases, and other hearing aid details. The patient confirmed understanding of hearing aid use and care, and showed proper insertion of hearing aid and batteries while in the office today. Mr. Sandhu reported good volume and sound quality today.    EAR(S) FIT: Binaural  HEARING AID MAKE: Right: Phonak; Left: Phonak    HEARING AID MODEL #: Right: Audeo L50-RL; Left: Audeo L50-RL  HEARING AID STYLE: Right: RITE; Left: RITE  DOME SIZE: Right:  Large Open; Left::  Large Open   LENGTH: Right:  2 MP; Left:  2 MP  SERIAL NUMBERS: Right: [id number]; Left: [id number]  WARRANTY END DATE: Right: 6/18/2027; Left:: 6/18/2027      The patient did not sign a waiver per their insurance contract, at this visit.       ASSESSMENT: Binaural hearing aid fitting completed today. Verification  measures were performed. The 45 day trial period was explained to patient, and they expressed understanding. Mr. Sandhu signed the Hearing Aid Purchase Agreement and was given a copy, as well as details on his hearing aids. Patient was counseled that exact out of pocket amounts cannot be determined for hearing aid claims being sent to insurance. Any insurance coverage information presented to the patient is an estimate only, and is not a guarantee of payment. Patient has been advised to check with their own insurance.    PLAN: Mr. Sandhu will return for follow-up in 2-3 weeks for a hearing aid review appointment. Please call this clinic with questions regarding today s appointment.    Donn Conde CCC-A  Licensed Audiologist #[id number]  6/4/2024

## 2024-06-04 NOTE — PATIENT INSTRUCTIONS

## 2024-06-18 ENCOUNTER — OFFICE VISIT (OUTPATIENT)
Dept: URGENT CARE | Facility: URGENT CARE | Age: 74
End: 2024-06-18
Payer: COMMERCIAL

## 2024-06-18 ENCOUNTER — OFFICE VISIT (OUTPATIENT)
Dept: AUDIOLOGY | Facility: CLINIC | Age: 74
End: 2024-06-18
Payer: COMMERCIAL

## 2024-06-18 ENCOUNTER — ANCILLARY PROCEDURE (OUTPATIENT)
Dept: GENERAL RADIOLOGY | Facility: CLINIC | Age: 74
End: 2024-06-18
Attending: PHYSICIAN ASSISTANT
Payer: COMMERCIAL

## 2024-06-18 VITALS
WEIGHT: 207.4 LBS | BODY MASS INDEX: 29.34 KG/M2 | SYSTOLIC BLOOD PRESSURE: 136 MMHG | HEART RATE: 86 BPM | DIASTOLIC BLOOD PRESSURE: 78 MMHG | RESPIRATION RATE: 12 BRPM | TEMPERATURE: 98.7 F | OXYGEN SATURATION: 96 %

## 2024-06-18 DIAGNOSIS — S89.91XA INJURY OF LOWER EXTREMITY, RIGHT, INITIAL ENCOUNTER: ICD-10-CM

## 2024-06-18 DIAGNOSIS — H90.3 SENSORINEURAL HEARING LOSS, ASYMMETRICAL: Primary | ICD-10-CM

## 2024-06-18 DIAGNOSIS — T14.8XXA HEMATOMA OF SKIN: ICD-10-CM
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
## 2024-06-18 DIAGNOSIS — E11.65 CONTROLLED TYPE 2 DIABETES MELLITUS WITH HYPERGLYCEMIA, UNSPECIFIED WHETHER LONG TERM INSULIN USE (H): ICD-10-CM

## 2024-06-18 DIAGNOSIS — S89.91XA INJURY OF LOWER EXTREMITY, RIGHT, INITIAL ENCOUNTER: Primary | ICD-10-CM

## 2024-06-18 PROCEDURE — V5299 HEARING SERVICE: HCPCS | Performed by: AUDIOLOGIST

## 2024-06-18 PROCEDURE — 73590 X-RAY EXAM OF LOWER LEG: CPT | Mod: TC | Performed by: RADIOLOGY

## 2024-06-18 PROCEDURE — 99214 OFFICE O/P EST MOD 30 MIN: CPT | Performed by: PHYSICIAN ASSISTANT

## 2024-06-18 NOTE — PROGRESS NOTES
AUDIOLOGY REPORT    SUBJECTIVE:Rick Sandhu is a 74 year old male who was seen in the Audiology Clinic at the [facility] on 6/18/2024  for a follow-up check regarding the fitting of new hearing aids. Previous results have revealed bilateral hearing loss.  The patient has been seen previously in this clinic and was fit with binaural hearing aids on 6/4/2024.  Rick reports good sound quality with the hearing aid(s) and increased wear time with the heaing aids. Epi reports the 'S' is a bit too much. Patient was unaccompanied to today's visit.     OBJECTIVE:   The International Outcome Inventory-Hearing Aids (IOI-HA) was administered today.The patient s responses to the 7 questions can be compared to normative data relative to how others are performing with their hearing aids, as well as focusing audiologic care and counseling.This patient s Quality of Life score (Question 7) was 3, which is at normative average.     Based on patient report, the following changes were made; Gain was reduced 6 steps for 6000 & 8000 Hz at all input levels bilaterally. Patient reports immediate improvement is 'S'.    Reviewed 45 day trial period, care, cleaning (no water, dry brush), batteries (size rechargeable) insertion/removal, toxicity, low-battery signal), aid insertion/removal, volume adjustment (if applicable), user booklet, warranty information, storage cases, and other hearing aid details.      ASSESSMENT: A follow-up appointment for hearing aid fitting was completed today. IOI-HA administered today. Changes to hearing aid was completed as outlined above.     PLAN:Rick will return for follow-up as needed, or at least every 9-12 months for cleaning and assessment of hearing aid.   Please call this clinic with any questions regarding today s appointment.    Donn Conde CCC-A  Licensed Audiologist #[id number]  6/18/2024

## 2024-06-18 NOTE — PROGRESS NOTES
Chief Complaint   Patient presents with    Urgent Care    Fall    Leg Pain     Fell last week Thursday afternoon and injured right lower leg/shin         X-ray-I see no obvious fracture    Results for orders placed or performed in visit on 06/18/24   XR Tibia and Fibula Right 2 Views     Status: None    Narrative    XR TIBIA AND FIBULA RIGHT 2 VIEWS 6/18/2024 2:42 PM     HISTORY: Injury of lower extremity, right, initial encounter; Fall,  initial encounter; Hematoma of skin  COMPARISON: None.       Impression    IMPRESSION: Tibia and fibula negative. No acute fracture. Soft tissue  swelling anteriorly.    TUCKER KELLEY MD         SYSTEM ID:  [id number]         ASSESSMENT:    ICD-10-CM    1. Injury of lower extremity, right, initial encounter  S89.91XA XR Tibia and Fibula Right 2 Views      2. Fall, initial encounter  W19.XXXA XR Tibia and Fibula Right 2 Views      3. Hematoma of skin  T14.8XXA XR Tibia and Fibula Right 2 Views      4. Controlled type 2 diabetes mellitus with hyperglycemia, unspecified whether long term insulin use (H)  E11.65             PLAN: Chin contusion/hematoma.  No evidence of infection.  No current concerns for DVT, no calf swelling or tenderness.  Negative Homans.  Ice, elevate, Ace wrap.  Watch for signs of infection.  Watch for increased pain, calf swelling, chest pain, shortness of breath.  Should this occur go directly to the ER.  Did discuss that hematomas can take the body 8 weeks to fully reabsorb.      Evelina Edwards PA-C        SUBJECTIVE:  Rick Sandhu is an 74 year old male who presents with right shin injury 5 days ago.  He was in his neighbors backyard trying to get his cat and he tripped over the fire pit.  Hit his head, no loss of consciousness, no headaches, no blurry vision.  Main concern is right anterior shin injury.  Large lump on the shin and now has bruising going downward into his ankle.  No fever or chills.  No calf pain.  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      OBJECTIVE:  Blood pressure 136/78, pulse 86, temperature 98.7  F (37.1  C), temperature source Oral, resp. rate 12, weight 94.1 kg (207 lb 6.4 oz), SpO2 96%.  Patient is alert and NAD.  EYES: conjunctiva clear  Leg Exam (right):  Inspection/palpation: Anterior shin is with bruising from just below the knee down to the ankle.  Has a plum sized tender fluid-filled collection upper medial tibia area.  No calf tenderness.  Negative Homans.  Ankle nontender with full range of motion.  Cap refill intact.    Good doralis pedis.  Neurovascularly Intact Distally.         Evelina Edwards PA-C

## 2024-06-20 SDOH — HEALTH STABILITY: PHYSICAL HEALTH: ON AVERAGE, HOW MANY MINUTES DO YOU ENGAGE IN EXERCISE AT THIS LEVEL?: 60 MIN

## 2024-06-20 SDOH — HEALTH STABILITY: PHYSICAL HEALTH: ON AVERAGE, HOW MANY DAYS PER WEEK DO YOU ENGAGE IN MODERATE TO STRENUOUS EXERCISE (LIKE A BRISK WALK)?: 3 DAYS

## 2024-06-20 ASSESSMENT — SOCIAL DETERMINANTS OF HEALTH (SDOH): HOW OFTEN DO YOU GET TOGETHER WITH FRIENDS OR RELATIVES?: TWICE A WEEK

## 2024-06-24 ENCOUNTER — ANCILLARY PROCEDURE (OUTPATIENT)
Dept: MRI IMAGING | Facility: CLINIC | Age: 74
End: 2024-06-24
Attending: INTERNAL MEDICINE
Payer: COMMERCIAL

## 2024-06-24 ENCOUNTER — OFFICE VISIT (OUTPATIENT)
Dept: URGENT CARE | Facility: URGENT CARE | Age: 74
End: 2024-06-24
Payer: COMMERCIAL

## 2024-06-24 ENCOUNTER — OFFICE VISIT (OUTPATIENT)
Dept: PEDIATRICS | Facility: CLINIC | Age: 74
End: 2024-06-24
Payer: COMMERCIAL

## 2024-06-24 VITALS
DIASTOLIC BLOOD PRESSURE: 69 MMHG | SYSTOLIC BLOOD PRESSURE: 113 MMHG | RESPIRATION RATE: 13 BRPM | OXYGEN SATURATION: 99 % | TEMPERATURE: 98 F | HEART RATE: 83 BPM

## 2024-06-24 VITALS
HEART RATE: 76 BPM | WEIGHT: 202.5 LBS | RESPIRATION RATE: 16 BRPM | SYSTOLIC BLOOD PRESSURE: 145 MMHG | DIASTOLIC BLOOD PRESSURE: 74 MMHG | BODY MASS INDEX: 28.65 KG/M2 | OXYGEN SATURATION: 96 % | TEMPERATURE: 96.9 F

## 2024-06-24 DIAGNOSIS — S89.91XD INJURY OF RIGHT LOWER EXTREMITY, SUBSEQUENT ENCOUNTER: Primary | ICD-10-CM

## 2024-06-24 DIAGNOSIS — E11.9 TYPE 2 DIABETES MELLITUS WITHOUT COMPLICATION, WITHOUT LONG-TERM CURRENT USE OF INSULIN (H): ICD-10-CM

## 2024-06-24 DIAGNOSIS — S80.11XA HEMATOMA OF RIGHT LOWER LEG: Primary | ICD-10-CM

## 2024-06-24 DIAGNOSIS — S80.11XA HEMATOMA OF RIGHT LOWER LEG: ICD-10-CM

## 2024-06-24 LAB
ACANTHOCYTES BLD QL SMEAR: NORMAL
ANION GAP SERPL CALCULATED.3IONS-SCNC: 12 MMOL/L (ref 7–15)
AUER BODIES BLD QL SMEAR: NORMAL
BASO STIPL BLD QL SMEAR: NORMAL
BITE CELLS BLD QL SMEAR: NORMAL
BLISTER CELLS BLD QL SMEAR: NORMAL
BUN SERPL-MCNC: 14 MG/DL (ref 8–23)
BURR CELLS BLD QL SMEAR: NORMAL
CALCIUM SERPL-MCNC: 10.1 MG/DL (ref 8.8–10.2)
CHLORIDE SERPL-SCNC: 97 MMOL/L (ref 98–107)
CK SERPL-CCNC: 112 U/L (ref 39–308)
CREAT SERPL-MCNC: 0.87 MG/DL (ref 0.67–1.17)
DACRYOCYTES BLD QL SMEAR: NORMAL
DEPRECATED HCO3 PLAS-SCNC: 26 MMOL/L (ref 22–29)
EGFRCR SERPLBLD CKD-EPI 2021: 90 ML/MIN/1.73M2
ELLIPTOCYTES BLD QL SMEAR: NORMAL
ERYTHROCYTE [DISTWIDTH] IN BLOOD BY AUTOMATED COUNT: 12.9 % (ref 10–15)
FRAGMENTS BLD QL SMEAR: NORMAL
GIANT PLATELETS BLD QL SMEAR: NORMAL
GLUCOSE SERPL-MCNC: 288 MG/DL (ref 70–99)
HCT VFR BLD AUTO: 47.5 % (ref 40–53)
HGB BLD-MCNC: 16.5 G/DL (ref 13.3–17.7)
HGB C CRYSTALS: NORMAL
HOWELL-JOLLY BOD BLD QL SMEAR: NORMAL
MCH RBC QN AUTO: 30.2 PG (ref 26.5–33)
MCHC RBC AUTO-ENTMCNC: 34.7 G/DL (ref 31.5–36.5)
MCV RBC AUTO: 87 FL (ref 78–100)
NEUTS HYPERSEG BLD QL SMEAR: NORMAL
PATH REV: NORMAL
PLAT MORPH BLD: NORMAL
PLATELET # BLD AUTO: 189 10E3/UL (ref 150–450)
POLYCHROMASIA BLD QL SMEAR: NORMAL
POTASSIUM SERPL-SCNC: 4.5 MMOL/L (ref 3.4–5.3)
RBC # BLD AUTO: 5.46 10E6/UL (ref 4.4–5.9)
RBC AGGLUT BLD QL: NORMAL
RBC MORPH BLD: NORMAL
ROULEAUX BLD QL SMEAR: NORMAL
SICKLE CELLS BLD QL SMEAR: NORMAL
SMUDGE CELLS BLD QL SMEAR: NORMAL
SODIUM SERPL-SCNC: 135 MMOL/L (ref 135–145)
SPHEROCYTES BLD QL SMEAR: NORMAL
STOMATOCYTES BLD QL SMEAR: NORMAL
TARGETS BLD QL SMEAR: NORMAL
TOXIC GRANULES BLD QL SMEAR: NORMAL
VARIANT LYMPHS BLD QL SMEAR: NORMAL
WBC # BLD AUTO: 8.9 10E3/UL (ref 4–11)

## 2024-06-24 PROCEDURE — 73719 MRI LOWER EXTREMITY W/DYE: CPT | Mod: RT | Performed by: RADIOLOGY

## 2024-06-24 PROCEDURE — 83036 HEMOGLOBIN GLYCOSYLATED A1C: CPT | Performed by: INTERNAL MEDICINE

## 2024-06-24 PROCEDURE — 99215 OFFICE O/P EST HI 40 MIN: CPT | Performed by: INTERNAL MEDICINE

## 2024-06-24 PROCEDURE — 99207 REFERRAL TO ACUTE AND DIAGNOSTIC SERVICES: CPT | Performed by: PHYSICIAN ASSISTANT

## 2024-06-24 PROCEDURE — 82550 ASSAY OF CK (CPK): CPT | Performed by: INTERNAL MEDICINE

## 2024-06-24 PROCEDURE — A9585 GADOBUTROL INJECTION: HCPCS | Mod: JZ | Performed by: RADIOLOGY

## 2024-06-24 PROCEDURE — 85027 COMPLETE CBC AUTOMATED: CPT | Performed by: INTERNAL MEDICINE

## 2024-06-24 PROCEDURE — 36415 COLL VENOUS BLD VENIPUNCTURE: CPT | Performed by: INTERNAL MEDICINE

## 2024-06-24 PROCEDURE — 80048 BASIC METABOLIC PNL TOTAL CA: CPT | Performed by: INTERNAL MEDICINE

## 2024-06-24 RX ORDER — OXYCODONE HYDROCHLORIDE 5 MG/1
5 TABLET ORAL EVERY 6 HOURS PRN
Qty: 12 TABLET | Refills: 0 | Status: SHIPPED | OUTPATIENT
Start: 2024-06-24 | End: 2024-06-27

## 2024-06-24 RX ORDER — GADOBUTROL 604.72 MG/ML
10 INJECTION INTRAVENOUS ONCE
Status: COMPLETED | OUTPATIENT
Start: 2024-06-24 | End: 2024-06-24

## 2024-06-24 RX ADMIN — GADOBUTROL 9 ML: 604.72 INJECTION INTRAVENOUS at 14:27

## 2024-06-24 ASSESSMENT — PAIN SCALES - GENERAL: PAINLEVEL: SEVERE PAIN (6)

## 2024-06-24 NOTE — PROGRESS NOTES
"Acute and Diagnostic Services Clinic Visit    Assessment & Plan     Hematoma of right lower leg  Secondary to trauma but with progressively worsening pain. MRI today to evaluate for fracture or compartment syndrome, negative for both. Likely with worsening hematoma due to ongoing aspirin use. Will have him stop ASA. Could consider draining hematoma but no acute indication for this aside from his degree of pain. He will see his PCP tomorrow for assessment.    - Basic metabolic panel; Future  - CBC with platelets; Future  - MR Tibia Fibula Lower Leg Right w Contrast; Future  - Basic metabolic panel  - CBC with platelets  - CK total; Future  - CK total  - oxyCODONE (ROXICODONE) 5 MG tablet; Take 1 tablet (5 mg) by mouth every 6 hours as needed for pain    40 minutes were spent doing chart review, history and exam, documentation and further activities per the note.      BMI  Estimated body mass index is 28.65 kg/m  as calculated from the following:    Height as of 7/10/23: 1.791 m (5' 10.5\").    Weight as of an earlier encounter on 6/24/24: 91.9 kg (202 lb 8 oz).             Return in about 1 day (around 6/25/2024) for follow up with PCP.    Jim Chowdary is a 74 year old, presenting for the following health issues:  Fall (Right lower leg)    HPI     Musculoskeletal problem/pain  Onset/Duration: Fall 10 days ago  Description:       Location: Right lower leg       Joint swelling: YES       Redness: YES       Pain: severe       Warmth: no   Progression of symptoms worse  Accompanying signs and symptoms:       Fevers: no        Numbness/tingling/weakness: no   History        Trauma to the area: YES        Previous history of Gout: no         Alcohol usage: YES- Occasionally         Diuretic use: no         Recent illness: no         Previous similar problem: no         Previous evaluation: no   Aggravating factors include: standing and walking  Therapies tried and outcome: rest/inactivity, heat, ice, acetaminophen, and " ACE wrap  Have you had any surgeries on your arteries of veins: No            Objective    There were no vitals taken for this visit.  There is no height or weight on file to calculate BMI.  Physical Exam   GENERAL: alert and no distress  RESP: lungs clear to auscultation - no rales, rhonchi or wheezes  CV: regular rate and rhythm, normal S1 S2, no S3 or S4, no murmur, click or rub, no peripheral edema  MS: right shin with hematoma, very very panful to light palpation. No distal swelling. No erythema.            Signed Electronically by: Ora Mandel MD

## 2024-06-24 NOTE — Clinical Note
Hi,  I saw Epi today with a large painful hematoma of hte right lower leg. MRI without evidence of muscle damage as we were concerned about possible compartment syndrome. No occult fracture. He had been taking Aspirin until today and this was likely making it worse. He will stop now. He's not super eager about a procedure but I did raise the question of IR drainage with him if it continues to worsen.

## 2024-06-24 NOTE — PATIENT INSTRUCTIONS
Your MRI and lab work show that you have a hematoma.     Please do not take aspirin until this has resolved. If pain is worse tomorrow, talk to your doctor about possible interventional radiology drainage

## 2024-06-24 NOTE — PROGRESS NOTES
Assessment & Plan     Injury of right lower extremity, subsequent encounter  Large, tender mass on right shin concerning for compartment syndrome vs. hematoma. Recommend following up immediately at [facility] for further workup and management of injury. [facility] staff notified and handoff completed with [facility] provider.  - Referral to Acute and Diagnostic Services (Day of diagnostic / First order acute)     Cynthia Johansen PA-C  [facility]    Subjective   Rick Sandhu is a 74 year old who presents for the following health issues     Patient presents with:  Fall: Fell two week ago and hit right lower on a rock. Seen about a week ago, Pain have gotten worst. Leg not getting better.    HPI    Presents to  with worsening pain in R hillman. Epi fell forward onto stones on 6/13 hitting his shin. He went to  on 6/18 to rule out a fracture. He comes in today with worsening pain in the R shin. He describes the pain as excruciating after laying/immobility for any amount of time. He also has sharp, shooting pain intermittently epifanio the front of his shin. He denies numbness/tinging, calf tenderness, chest pain, and shortness of breath. He has been self treating with ice, heat, compression, and Tylenol for pain. He did get crutches this morning as the pain has become unbearable with movement.      Review of Systems   ROS negative except as stated above.      Objective    BP (!) 145/74 (BP Location: Left arm, Patient Position: Sitting, Cuff Size: Adult Regular)   Pulse 76   Temp 96.9  F (36.1  C) (Tympanic)   Resp 16   Wt 91.9 kg (202 lb 8 oz)   SpO2 96%   BMI 28.65 kg/m    Physical Exam   GENERAL: alert and no distress  RESP: lungs clear to auscultation - no rales, rhonchi or wheezes  CV: regular rate and rhythm, normal S1 S2, no S3 or S4, no murmur, click or rub, no peripheral edema  MS: no gross musculoskeletal defects noted, no edema  SKIN: no suspicious lesions or rashes, ecchymoses of varying degrees  of healing on the RLE below the knee and into the R ankle. Firm, palpable mass on the inner aspect of the anterior shin very painful to the touch. See photos below.  NEURO: Normal strength and tone, mentation intact and speech normal. RLE CMS intact          No results found for any visits on 06/24/24.

## 2024-06-25 ENCOUNTER — OFFICE VISIT (OUTPATIENT)
Dept: FAMILY MEDICINE | Facility: CLINIC | Age: 74
End: 2024-06-25
Attending: FAMILY MEDICINE
Payer: COMMERCIAL

## 2024-06-25 VITALS
HEIGHT: 71 IN | WEIGHT: 204.13 LBS | DIASTOLIC BLOOD PRESSURE: 72 MMHG | SYSTOLIC BLOOD PRESSURE: 116 MMHG | TEMPERATURE: 98.5 F | RESPIRATION RATE: 14 BRPM | HEART RATE: 68 BPM | BODY MASS INDEX: 28.58 KG/M2 | OXYGEN SATURATION: 95 %

## 2024-06-25 DIAGNOSIS — E11.9 TYPE 2 DIABETES MELLITUS WITHOUT COMPLICATION, WITHOUT LONG-TERM CURRENT USE OF INSULIN (H): ICD-10-CM

## 2024-06-25 DIAGNOSIS — Z00.00 ENCOUNTER FOR MEDICARE ANNUAL WELLNESS EXAM: Primary | ICD-10-CM

## 2024-06-25 DIAGNOSIS — E78.5 HYPERLIPIDEMIA LDL GOAL <70: ICD-10-CM

## 2024-06-25 DIAGNOSIS — K86.89 PANCREATIC MASS: ICD-10-CM

## 2024-06-25 DIAGNOSIS — I10 HYPERTENSION GOAL BP (BLOOD PRESSURE) < 140/90: ICD-10-CM

## 2024-06-25 DIAGNOSIS — S80.11XA HEMATOMA OF RIGHT LOWER LEG: ICD-10-CM

## 2024-06-25 LAB — HBA1C MFR BLD: 8.6 % (ref 0–5.6)

## 2024-06-25 PROCEDURE — G0439 PPPS, SUBSEQ VISIT: HCPCS | Performed by: FAMILY MEDICINE

## 2024-06-25 PROCEDURE — 99214 OFFICE O/P EST MOD 30 MIN: CPT | Mod: 25 | Performed by: FAMILY MEDICINE

## 2024-06-25 RX ORDER — GLIPIZIDE 5 MG/1
5 TABLET, FILM COATED, EXTENDED RELEASE ORAL DAILY
Qty: 90 TABLET | Refills: 1 | Status: SHIPPED | OUTPATIENT
Start: 2024-06-25

## 2024-06-25 RX ORDER — ATORVASTATIN CALCIUM 10 MG/1
10 TABLET, FILM COATED ORAL DAILY
Qty: 90 TABLET | Refills: 0 | Status: SHIPPED | OUTPATIENT
Start: 2024-06-25

## 2024-06-25 RX ORDER — RESPIRATORY SYNCYTIAL VIRUS VACCINE 120MCG/0.5
0.5 KIT INTRAMUSCULAR ONCE
Qty: 1 EACH | Refills: 0 | Status: CANCELLED | OUTPATIENT
Start: 2024-06-25 | End: 2024-06-25

## 2024-06-25 ASSESSMENT — PAIN SCALES - GENERAL: PAINLEVEL: EXTREME PAIN (8)

## 2024-06-25 NOTE — PATIENT INSTRUCTIONS
"Patient Education   Preventive Care Advice   This is general advice we often give to help people stay healthy. Your care team may have specific advice just for you. Please talk to your care team about your own preventive care needs.  Lifestyle  Exercise at least 150 minutes each week (30 minutes a day, 5 days a week).  Do muscle strengthening activities 2 days a week. These help control your weight and prevent disease.  No smoking.  Wear sunscreen to prevent skin cancer.  Have your home tested for radon every 2 to 5 years. Radon is a colorless, odorless gas that can harm your lungs. To learn more, go to www.health.[facility].mn.us and search for \"Radon in Homes.\"  Keep guns unloaded and locked up in a safe place like a safe or gun vault, or, use a gun lock and hide the keys. Always lock away bullets separately. To learn more, visit [employer].gov and search for \"safe gun storage.\"  Nutrition  Eat 5 or more servings of fruits and vegetables each day.  Try wheat bread, brown rice and whole grain pasta (instead of white bread, rice, and pasta).  Get enough calcium and vitamin D. Check the label on foods and aim for 100% of the RDA (recommended daily allowance).  Regular exams  Have a dental exam and cleaning every 6 months.  See your health care team every year to talk about:  Any changes in your health.  Any medicines your care team has prescribed.  Preventive care, family planning, and ways to prevent chronic diseases.  Shots (vaccines)   HPV shots (up to age 26), if you've never had them before.  Hepatitis B shots (up to age 59), if you've never had them before.  COVID-19 shot: Get this shot when it's due.  Flu shot: Get a flu shot every year.  Tetanus shot: Get a tetanus shot every 10 years.  Pneumococcal, hepatitis A, and RSV shots: Ask your care team if you need these based on your risk.  Shingles shot (for age 50 and up).  General health tests  Diabetes screening:  Starting at age 35, Get screened for diabetes at least " every 3 years.  If you are younger than age 35, ask your care team if you should be screened for diabetes.  Cholesterol test: At age 39, start having a cholesterol test every 5 years, or more often if advised.  Bone density scan (DEXA): At age 50, ask your care team if you should have this scan for osteoporosis (brittle bones).  Hepatitis C: Get tested at least once in your life.  Abdominal aortic aneurysm screening: Talk to your doctor about having this screening if you:  Have ever smoked; and  Are biologically male; and  Are between the ages of 65 and 75.  STIs (sexually transmitted infections)  Before age 24: Ask your care team if you should be screened for STIs.  After age 24: Get screened for STIs if you're at risk. You are at risk for STIs (including HIV) if:  You are sexually active with more than one person.  You don't use condoms every time.  You or a partner was diagnosed with a sexually transmitted infection.  If you are at risk for HIV, ask about PrEP medicine to prevent HIV.  Get tested for HIV at least once in your life, whether you are at risk for HIV or not.  Cancer screening tests  Cervical cancer screening: If you have a cervix, begin getting regular cervical cancer screening tests at age 21. Most people who have regular screenings with normal results can stop after age 65. Talk about this with your provider.  Breast cancer scan (mammogram): If you've ever had breasts, begin having regular mammograms starting at age 40. This is a scan to check for breast cancer.  Colon cancer screening: It is important to start screening for colon cancer at age 45.  Have a colonoscopy test every 10 years (or more often if you're at risk) Or, ask your provider about stool tests like a FIT test every year or Cologuard test every 3 years.  To learn more about your testing options, visit: www.[employer]/055266.pdf.  For help making a decision, visit: laury/je99923.  Prostate cancer screening test: If you have a  prostate and are age 55 to 69, ask your provider if you would benefit from a yearly prostate cancer screening test.  Lung cancer screening: If you are a current or former smoker age 50 to 80, ask your care team if ongoing lung cancer screenings are right for you.  For informational purposes only. Not to replace the advice of your health care provider. Copyright   2023 [facility]. All rights reserved. Clinically reviewed by the [facility] Transitions Program. [employer] 983422 - REV 04/24.  Learning About Activities of Daily Living  What are activities of daily living?     Activities of daily living (ADLs) are the basic self-care tasks you do every day. These include eating, bathing, dressing, and moving around.  As you age, and if you have health problems, you may find that it's harder to do some of these tasks. If so, your doctor can suggest ideas that may help.  To measure what kind of help you may need, your doctor will ask how well you are able to do ADLs. Let your doctor know if there are any tasks that you are having trouble doing. This is an important first step to getting help. And when you have the help you need, you can stay as independent as possible.  How will a doctor assess your ADLs?  Asking about ADLs is part of a routine health checkup your doctor will likely do as you age. Your health check might be done in a doctor's office, in your home, or at a hospital. The goal is to find out if you are having any problems that could make it hard to care for yourself or that make it unsafe for you to be on your own.  To measure your ADLs, your doctor will ask how hard it is for you to do routine tasks. Your doctor may also want to know if you have changed the way you do a task because of a health problem. Your doctor may watch how you:  Walk back and forth.  Keep your balance while you stand or walk.  Move from sitting to standing or from a bed to a chair.  Button or unbutton a shirt or  sweater.  Remove and put on your shoes.  It's common to feel a little worried or anxious if you find you can't do all the things you used to be able to do. Talking with your doctor about ADLs is a way to make sure you're as safe as possible and able to care for yourself as well as you can. You may want to bring a caregiver, friend, or family member to your checkup. They can help you talk to your doctor.  Follow-up care is a key part of your treatment and safety. Be sure to make and go to all appointments, and call your doctor if you are having problems. It's also a good idea to know your test results and keep a list of the medicines you take.  Current as of: October 24, 2023               Content Version: 14.0    4799-2376 [employer].   Care instructions adapted under license by your healthcare professional. If you have questions about a medical condition or this instruction, always ask your healthcare professional. [employer] disclaims any warranty or liability for your use of this information.      Hearing Loss: Care Instructions  Overview     Hearing loss is a sudden or slow decrease in how well you hear. It can range from slight to profound. Permanent hearing loss can occur with aging. It also can happen when you are exposed long-term to loud noise. Examples include listening to loud music, riding motorcycles, or being around other loud machines.  Hearing loss can affect your work and home life. It can make you feel lonely or depressed. You may feel that you have lost your independence. But hearing aids and other devices can help you hear better and feel connected to others.  Follow-up care is a key part of your treatment and safety. Be sure to make and go to all appointments, and call your doctor if you are having problems. It's also a good idea to know your test results and keep a list of the medicines you take.  How can you care for yourself at home?  Avoid loud noises whenever  possible. This helps keep your hearing from getting worse.  Always wear hearing protection around loud noises.  Wear a hearing aid as directed.  A professional can help you pick a hearing aid that will work best for you.  You can also get hearing aids over the counter for mild to moderate hearing loss.  Have hearing tests as your doctor suggests. They can show whether your hearing has changed. Your hearing aid may need to be adjusted.  Use other devices as needed. These may include:  Telephone amplifiers and hearing aids that can connect to a television, stereo, radio, or microphone.  Devices that use lights or vibrations. These alert you to the doorbell, a ringing telephone, or a baby monitor.  Television closed-captioning. This shows the words at the bottom of the screen. Most new TVs can do this.  TTY (text telephone). This lets you type messages back and forth on the telephone instead of talking or listening. These devices are also called TDD. When messages are typed on the keyboard, they are sent over the phone line to a receiving TTY. The message is shown on a monitor.  Use text messaging, social media, and email if it is hard for you to communicate by telephone.  Try to learn a listening technique called speechreading. It is not lipreading. You pay attention to people's gestures, expressions, posture, and tone of voice. These clues can help you understand what a person is saying. Face the person you are talking to, and have them face you. Make sure the lighting is good. You need to see the other person's face clearly.  Think about counseling if you need help to adjust to your hearing loss.  When should you call for help?  Watch closely for changes in your health, and be sure to contact your doctor if:    You think your hearing is getting worse.     You have new symptoms, such as dizziness or nausea.   Where can you learn more?  Go to https://www.healthwise.net/patiented  Enter R798 in the search box to learn  "more about \"Hearing Loss: Care Instructions.\"  Current as of: September 27, 2023               Content Version: 14.0    2384-2542 [employer].   Care instructions adapted under license by your healthcare professional. If you have questions about a medical condition or this instruction, always ask your healthcare professional. [employer] disclaims any warranty or liability for your use of this information.         Maximum dosages or over the counter pain medicines:    - ibuprofen (advil) 2400 mg daily - or 4 tabs three times daily  Or  - Aleve 2 tabs twice daily  AND can add  - acetamenophen (Tylenol) 4000 mg daily - or 2 tabs 4 times daily   "

## 2024-06-25 NOTE — PROGRESS NOTES
"Preventive Care Visit  [facility] JASPER Pedraza Donn Muñoz MD, Family Medicine  Jun 25, 2024      Assessment & Plan     Encounter for Medicare annual wellness exam  Routine health maintenance otherwise up-to-date.  Discussed recommended vaccination schedule    Type 2 diabetes mellitus without complication, without long-term current use of insulin (H)  Will add hemoglobin A1c onto his lab work last 1 was 8.5 so we are looking for better control  - Hemoglobin A1c; Future  - glipiZIDE (GLUCOTROL XL) 5 MG 24 hr tablet; Take 1 tablet (5 mg) by mouth daily  - metFORMIN (GLUCOPHAGE) 500 MG tablet; Take 1 tablet (500 mg) by mouth 2 times daily (with meals) Profile Rx: patient will contact pharmacy when needed    Hypertension goal BP (blood pressure) < 140/90  Stable on current regimen.  Continue same plan and routine follow-up.     Hyperlipidemia LDL goal <70  Stable on current regimen.  Continue same plan and routine follow-up.   - atorvastatin (LIPITOR) 10 MG tablet; Take 1 tablet (10 mg) by mouth daily    Pancreatic mass  Endoscopic ultrasound of benign lesion last year.  No further surveillance is needed    Hematoma of right lower leg  Was seen through the [facility] yesterday for hematoma to right shin that he obtained a couple of weeks ago.  MRI unremarkable.  This will just take some time to heal up and drainage is not recommended at this point.    Patient has been advised of split billing requirements and indicates understanding: Yes        BMI  Estimated body mass index is 28.87 kg/m  as calculated from the following:    Height as of this encounter: 1.791 m (5' 10.5\").    Weight as of this encounter: 92.6 kg (204 lb 2 oz).       Counseling  Appropriate preventive services were discussed with this patient, including applicable screening as appropriate for fall prevention, nutrition, physical activity, Tobacco-use cessation, weight loss and cognition.  Checklist reviewing preventive services available " has been given to the patient.  Reviewed patient's diet, addressing concerns and/or questions.   He is at risk for lack of exercise and has been provided with information to increase physical activity for the benefit of his well-being.   He is at risk for psychosocial distress and has been provided with information to reduce risk.   Patient reported safety concerns were addressed today.The patient was provided with written information regarding signs of hearing loss.       Regular exercise  See Patient Instructions    Jim Chowdary is a 74 year old, presenting for the following:  Medicare Visit        6/25/2024     8:40 AM   Additional Questions   Roomed by Lakia BAUMANN   Accompanied by self         6/25/2024     8:40 AM   Patient Reported Additional Medications   Patient reports taking the following new medications None         Health Care Directive  Patient does not have a Health Care Directive or Living Will: Discussed advance care planning with patient; however, patient declined at this time.    HPI    Here today for routine wellness exam.  Other than recent injuries doing well overall.  Exercises regularly.      6/20/2024   General Health   How would you rate your overall physical health? Good   Feel stress (tense, anxious, or unable to sleep) Only a little      (!) STRESS CONCERN      6/20/2024   Nutrition   Diet: Regular (no restrictions)            6/20/2024   Exercise   Days per week of moderate/strenous exercise 3 days   Average minutes spent exercising at this level 60 min            6/20/2024   Social Factors   Frequency of gathering with friends or relatives Twice a week   Worry food won't last until get money to buy more No   Food not last or not have enough money for food? No   Do you have housing? (Housing is defined as stable permanent housing and does not include staying ouside in a car, in a tent, in an abandoned building, in an overnight shelter, or couch-surfing.) Yes   Are you worried about  losing your housing? No   Lack of transportation? No   Unable to get utilities (heat,electricity)? No            6/20/2024   Fall Risk   Fallen 2 or more times in the past year? No   Trouble with walking or balance? No             6/20/2024   Activities of Daily Living- Home Safety   Needs help with the following daily activites None of the above   Safety concerns in the home No grab bars in the bathroom            6/20/2024   Dental   Dentist two times every year? Yes            6/20/2024   Hearing Screening   Hearing concerns? (!) I FEEL THAT PEOPLE ARE MUMBLING OR NOT SPEAKING CLEARLY.    (!) I NEED TO ASK PEOPLE TO SPEAK UP OR REPEAT THEMSELVES.    (!) IT'S HARD TO FOLLOW A CONVERSATION IN A NOISY RESTAURANT OR CROWDED ROOM.    (!) TROUBLE FOLLOWING DIALOGUE IN THE THEATHER.    (!) TROUBLE UNDERSTANDING SOFT OR WHISPERED SPEECH.       Multiple values from one day are sorted in reverse-chronological order         6/20/2024   Driving Risk Screening   Patient/family members have concerns about driving No            6/20/2024   General Alertness/Fatigue Screening   Have you been more tired than usual lately? No            6/20/2024   Urinary Incontinence Screening   Bothered by leaking urine in past 6 months No            6/20/2024   TB Screening   Were you born outside of the [place]? No            Today's PHQ-2 Score:       6/24/2024     9:36 AM   PHQ-2 ( 1999 [employer])   Q1: Little interest or pleasure in doing things 0   Q2: Feeling down, depressed or hopeless 0   PHQ-2 Score 0   Q1: Little interest or pleasure in doing things Not at all   Q2: Feeling down, depressed or hopeless Not at all   PHQ-2 Score 0           6/20/2024   Substance Use   Alcohol more than 3/day or more than 7/wk No   Do you have a current opioid prescription? No   How severe/bad is pain from 1 to 10? 5/10   Do you use any other substances recreationally? No        Social History     Tobacco Use    Smoking status: Former     Current packs/day: 0.00      Types: Cigarettes     Start date: 1964     Quit date: 8/15/1987     Years since quittin.8     Passive exposure: Past    Smokeless tobacco: Never   Vaping Use    Vaping status: Never Used   Substance Use Topics    Alcohol use: Yes     Alcohol/week: 0.0 standard drinks of alcohol     Comment: 1-2 drinks every other week    Drug use: No       ASCVD Risk   The 10-year ASCVD risk score (Sarah JAMES, et al., 2019) is: 42.4%    Values used to calculate the score:      Age: 74 years      Sex: Male      Is Non- : No      Diabetic: Yes      Tobacco smoker: No      Systolic Blood Pressure: 116 mmHg      Is BP treated: Yes      HDL Cholesterol: 39 mg/dL      Total Cholesterol: 165 mg/dL            Reviewed and updated as needed this visit by Provider   Tobacco  Allergies  Meds  Problems  Med Hx  Surg Hx  Fam Hx            Lab work is in process  Labs reviewed in EPIC  BP Readings from Last 3 Encounters:   24 116/72   24 113/69   24 (!) 145/74    Wt Readings from Last 3 Encounters:   24 92.6 kg (204 lb 2 oz)   24 91.9 kg (202 lb 8 oz)   24 94.1 kg (207 lb 6.4 oz)                  Current providers sharing in care for this patient include:  Patient Care Team:  Keila Muñoz MD as PCP - General (Family Practice)  Keila Muñoz MD as Assigned PCP  Mark Judge MD as MD (Hematology & Oncology)  Herminia De La Rosa AuD as Audiologist (Audiology)  Mark Judge MD as Assigned Pediatric Specialist Provider  aMn Bedolla MD as MD (Ophthalmology)  Man Bedolla MD as Assigned Surgical Provider    The following health maintenance items are reviewed in Epic and correct as of today:  Health Maintenance   Topic Date Due    ZOSTER IMMUNIZATION (1 of 2) Never done    RSV VACCINE (Pregnancy & 60+) (1 - 1-dose 60+ series) Never done    Pneumococcal Vaccine: 65+ Years (3 of 3 - PPSV23 or PCV20) 2018    DIABETIC FOOT  "EXAM  10/01/2019    COVID-19 Vaccine (4 - 2023-24 season) 09/01/2023    A1C  06/19/2024    DTAP/TDAP/TD IMMUNIZATION (1 - Tdap) 06/15/2027 (Originally 2/23/2017)    INFLUENZA VACCINE (Season Ended) 09/01/2024    CMP  12/19/2024    LIPID  12/19/2024    MICROALBUMIN  12/19/2024    EYE EXAM  05/08/2025    MEDICARE ANNUAL WELLNESS VISIT  06/25/2025    ANNUAL REVIEW OF HM ORDERS  06/25/2025    FALL RISK ASSESSMENT  06/25/2025    COLORECTAL CANCER SCREENING  08/23/2026    ADVANCE CARE PLANNING  06/25/2029    HEPATITIS C SCREENING  Completed    PHQ-2 (once per calendar year)  Completed    AORTIC ANEURYSM SCREENING (SYSTEM ASSIGNED)  Completed    IPV IMMUNIZATION  Aged Out    HPV IMMUNIZATION  Aged Out    MENINGITIS IMMUNIZATION  Aged Out    RSV MONOCLONAL ANTIBODY  Aged Out    BMP  Discontinued         Review of Systems  CONSTITUTIONAL: NEGATIVE for fever, chills, change in weight  INTEGUMENTARY/SKIN: NEGATIVE for worrisome rashes, moles or lesions  EYES: NEGATIVE for vision changes or irritation  ENT/MOUTH: NEGATIVE for ear, mouth and throat problems  RESP: NEGATIVE for significant cough or SOB  BREAST: NEGATIVE for masses, tenderness or discharge  CV: NEGATIVE for chest pain, palpitations or peripheral edema  GI: NEGATIVE for nausea, abdominal pain, heartburn, or change in bowel habits  : NEGATIVE for frequency, dysuria, or hematuria  MUSCULOSKELETAL: NEGATIVE for significant arthralgias or myalgia  NEURO: NEGATIVE for weakness, dizziness or paresthesias  ENDOCRINE: NEGATIVE for temperature intolerance, skin/hair changes  HEME: NEGATIVE for bleeding problems  PSYCHIATRIC: NEGATIVE for changes in mood or affect     Objective    Exam  /72 (BP Location: Right arm, Patient Position: Sitting, Cuff Size: Adult Large)   Pulse 68   Temp 98.5  F (36.9  C) (Oral)   Resp 14   Ht 1.791 m (5' 10.5\")   Wt 92.6 kg (204 lb 2 oz)   SpO2 95%   BMI 28.87 kg/m     Estimated body mass index is 28.87 kg/m  as calculated from " "the following:    Height as of this encounter: 1.791 m (5' 10.5\").    Weight as of this encounter: 92.6 kg (204 lb 2 oz).    Physical Exam  GENERAL: alert and no distress  EYES: Eyes grossly normal to inspection, PERRL and conjunctivae and sclerae normal  HENT: ear canals and TM's normal, nose and mouth without ulcers or lesions  NECK: no adenopathy, no asymmetry, masses, or scars  RESP: lungs clear to auscultation - no rales, rhonchi or wheezes  CV: regular rate and rhythm, normal S1 S2, no S3 or S4, no murmur, click or rub, no peripheral edema  ABDOMEN: soft, nontender, no hepatosplenomegaly, no masses and bowel sounds normal  MS: no gross musculoskeletal defects noted, no edema  SKIN: no suspicious lesions or rashes  NEURO: Normal strength and tone, mentation intact and speech normal  PSYCH: mentation appears normal, affect normal/bright        6/25/2024   Mini Cog   Clock Draw Score 2 Normal   3 Item Recall 3 objects recalled   Mini Cog Total Score 5                 Signed Electronically by: Keila Muñoz MD    "

## 2024-06-25 NOTE — RESULT ENCOUNTER NOTE
Epi,  Well, that A1c has not come down.  Ever so slightly higher than last time.  But I do not think we necessarily need to add another medication but I think we should maximize what you already have.  So I would say lets increase the metformin to 1000 mg twice daily.  You can just take 2 tablets twice a day, basically doubling up on the medication.  I will make sure a new prescription is sent to the pharmacy since she will run out earlier.  And as always work on diet, exercise, etc.  SFide Muñoz M.D.

## 2024-09-13 DIAGNOSIS — I10 ESSENTIAL HYPERTENSION WITH GOAL BLOOD PRESSURE LESS THAN 140/90: ICD-10-CM

## 2024-09-13 RX ORDER — LISINOPRIL AND HYDROCHLOROTHIAZIDE 20; 25 MG/1; MG/1
1 TABLET ORAL DAILY
Qty: 90 TABLET | Refills: 2 | Status: SHIPPED | OUTPATIENT
Start: 2024-09-13

## 2024-11-21 DIAGNOSIS — E78.5 HYPERLIPIDEMIA LDL GOAL <70: ICD-10-CM

## 2024-11-21 RX ORDER — ATORVASTATIN CALCIUM 10 MG/1
10 TABLET, FILM COATED ORAL DAILY
Qty: 90 TABLET | Refills: 1 | Status: SHIPPED | OUTPATIENT
Start: 2024-11-21

## 2025-01-20 ENCOUNTER — OFFICE VISIT (OUTPATIENT)
Dept: FAMILY MEDICINE | Facility: CLINIC | Age: 75
End: 2025-01-20
Payer: COMMERCIAL

## 2025-01-20 VITALS
BODY MASS INDEX: 31.61 KG/M2 | HEART RATE: 80 BPM | DIASTOLIC BLOOD PRESSURE: 78 MMHG | TEMPERATURE: 98.2 F | RESPIRATION RATE: 12 BRPM | SYSTOLIC BLOOD PRESSURE: 134 MMHG | OXYGEN SATURATION: 97 % | WEIGHT: 201.4 LBS | HEIGHT: 67 IN

## 2025-01-20 DIAGNOSIS — E11.9 TYPE 2 DIABETES MELLITUS WITHOUT COMPLICATION, WITHOUT LONG-TERM CURRENT USE OF INSULIN (H): Primary | ICD-10-CM

## 2025-01-20 DIAGNOSIS — E11.3299 BACKGROUND DIABETIC RETINOPATHY (H): ICD-10-CM

## 2025-01-20 DIAGNOSIS — I10 HYPERTENSION GOAL BP (BLOOD PRESSURE) < 140/90: ICD-10-CM

## 2025-01-20 DIAGNOSIS — E78.5 HYPERLIPIDEMIA LDL GOAL <70: ICD-10-CM

## 2025-01-20 DIAGNOSIS — K59.01 SLOW TRANSIT CONSTIPATION: ICD-10-CM

## 2025-01-20 PROCEDURE — 99214 OFFICE O/P EST MOD 30 MIN: CPT | Performed by: FAMILY MEDICINE

## 2025-01-20 PROCEDURE — G2211 COMPLEX E/M VISIT ADD ON: HCPCS | Performed by: FAMILY MEDICINE

## 2025-01-20 RX ORDER — GLIPIZIDE 5 MG/1
5 TABLET, FILM COATED, EXTENDED RELEASE ORAL DAILY
Qty: 90 TABLET | Refills: 1 | Status: SHIPPED | OUTPATIENT
Start: 2025-01-20

## 2025-01-20 ASSESSMENT — PAIN SCALES - GENERAL: PAINLEVEL_OUTOF10: NO PAIN (0)

## 2025-01-20 NOTE — PROGRESS NOTES
"  Assessment & Plan     Type 2 diabetes mellitus without complication, without long-term current use of insulin (H)  A1c 7.0.  Stable on current regimen.  Continue same plan and routine follow-up.   - metFORMIN (GLUCOPHAGE) 500 MG tablet; Take 2 tablets (1,000 mg) by mouth 2 times daily (with meals).  - glipiZIDE (GLUCOTROL XL) 5 MG 24 hr tablet; Take 1 tablet (5 mg) by mouth daily.    Hypertension goal BP (blood pressure) < 140/90  Stable on current regimen.  Continue same plan and routine follow-up.     Hyperlipidemia LDL goal <70  Stable on current regimen.  Continue same plan and routine follow-up.     Slow transit constipation  Has been having some issues with constipation for the past month.  It is not particularly hard but it sounds like he needs some type of stimulation to at least go every few days.  So we talked about a more aggressive regimen to keep this really going for a couple of weeks and get him back into a normal cycle.    Background diabetic retinopathy, mild, os  Continues to follow with ophthalmology          BMI  Estimated body mass index is 31.54 kg/m  as calculated from the following:    Height as of this encounter: 1.702 m (5' 7\").    Weight as of this encounter: 91.4 kg (201 lb 6.4 oz).         Regular exercise  See Patient Instructions    Jim Chowdary is a 75 year old, presenting for the following health issues:  follow up         1/20/2025    12:38 PM   Additional Questions   Roomed by Holli     History of Present Illness       Diabetes:   He presents for follow up of diabetes.    He is not checking blood glucose.         He has no concerns regarding his diabetes at this time.   He is not experiencing numbness or burning in feet, excessive thirst, blurry vision, weight changes or redness, sores or blisters on feet.           Hyperlipidemia:  He presents for follow up of hyperlipidemia.   He is taking medication to lower cholesterol. He is not having myalgia or other side effects to " "statin medications.    Hypertension: He presents for follow up of hypertension.  He does not check blood pressure  regularly outside of the clinic. Outside blood pressures have been over 140/90. He does not follow a low salt diet.     He eats 0-1 servings of fruits and vegetables daily.He consumes 0 sweetened beverage(s) daily.He exercises with enough effort to increase his heart rate 9 or less minutes per day.  He exercises with enough effort to increase his heart rate 3 or less days per week.   He is taking medications regularly.           Here today in follow-up of diabetes and other chronic issues.  Also has some questions about constipation.      Review of Systems  Constitutional, HEENT, cardiovascular, pulmonary, gi and gu systems are negative, except as otherwise noted.      Objective    /78   Pulse 80   Temp 98.2  F (36.8  C)   Resp 12   Ht 1.702 m (5' 7\")   Wt 91.4 kg (201 lb 6.4 oz)   SpO2 97%   BMI 31.54 kg/m    Body mass index is 31.54 kg/m .  Physical Exam   Alert, pleasant, upbeat, and in no apparent discomfort.  S1 and S2 normal, no murmurs, clicks, gallops or rubs. Regular rate and rhythm. Chest is clear; no wheezes or rales. No edema or JVD.  Past labs reviewed with the patient.     The longitudinal plan of care for the diagnosis(es)/condition(s) as documented were addressed during this visit. Due to the added complexity in care, I will continue to support Epi in the subsequent management and with ongoing continuity of care.        Signed Electronically by: Keila Muñoz MD    "

## 2025-01-20 NOTE — PATIENT INSTRUCTIONS
"Constipation Treatment   Safe for everyday use (even in combo)   - Fiber (metamucil, citrucil, etc)   - Miralax   - Colace (stool softener)   For \"as needed use\"   - senna (Senokot, Ex Lax)   - Milk of Magnesia   - Magnesium Citrate     "

## 2025-03-18 ENCOUNTER — MYC MEDICAL ADVICE (OUTPATIENT)
Dept: FAMILY MEDICINE | Facility: CLINIC | Age: 75
End: 2025-03-18
Payer: COMMERCIAL

## 2025-03-18 NOTE — TELEPHONE ENCOUNTER
Pt's appointment on 7/22/2025 with Dr. Muñoz has been cancelled. Provider will not be in clinic this day. Contacted pt via [employer]/phone and left message informing pt appt has been cancelled and needs to be r/s.  When pt calls back please help reschedule pt.  Amber Landeros

## 2025-03-26 DIAGNOSIS — I10 ESSENTIAL HYPERTENSION WITH GOAL BLOOD PRESSURE LESS THAN 140/90: ICD-10-CM

## 2025-03-27 RX ORDER — LISINOPRIL AND HYDROCHLOROTHIAZIDE 20; 25 MG/1; MG/1
1 TABLET ORAL DAILY
Qty: 90 TABLET | Refills: 2 | OUTPATIENT
Start: 2025-03-27

## 2025-05-12 ENCOUNTER — OFFICE VISIT (OUTPATIENT)
Dept: OPHTHALMOLOGY | Facility: CLINIC | Age: 75
End: 2025-05-12
Payer: COMMERCIAL

## 2025-05-12 DIAGNOSIS — L71.9 ROSACEA: ICD-10-CM

## 2025-05-12 DIAGNOSIS — Z01.01 ENCOUNTER FOR EXAMINATION OF EYES AND VISION WITH ABNORMAL FINDINGS: ICD-10-CM

## 2025-05-12 DIAGNOSIS — E11.9 TYPE 2 DIABETES MELLITUS WITHOUT COMPLICATION, WITHOUT LONG-TERM CURRENT USE OF INSULIN (H): Primary | ICD-10-CM

## 2025-05-12 DIAGNOSIS — H25.813 COMBINED FORMS OF AGE-RELATED CATARACT OF BOTH EYES: ICD-10-CM

## 2025-05-12 DIAGNOSIS — H52.4 PRESBYOPIA: ICD-10-CM

## 2025-05-12 DIAGNOSIS — E11.3299 BACKGROUND DIABETIC RETINOPATHY (H): ICD-10-CM

## 2025-05-12 DIAGNOSIS — H43.813 POSTERIOR VITREOUS DETACHMENT, BILATERAL: ICD-10-CM

## 2025-05-12 PROCEDURE — 92134 CPTRZ OPH DX IMG PST SGM RTA: CPT | Performed by: OPHTHALMOLOGY

## 2025-05-12 PROCEDURE — 92015 DETERMINE REFRACTIVE STATE: CPT | Performed by: OPHTHALMOLOGY

## 2025-05-12 PROCEDURE — 92014 COMPRE OPH EXAM EST PT 1/>: CPT | Performed by: OPHTHALMOLOGY

## 2025-05-12 PROCEDURE — 2022F DILAT RTA XM EVC RTNOPTHY: CPT | Performed by: OPHTHALMOLOGY

## 2025-05-12 ASSESSMENT — VISUAL ACUITY
OD_CC: J2 -2
OS_CC+: +2
METHOD: SNELLEN - LINEAR
OD_CC+: +2
OS_CC: 20/30
CORRECTION_TYPE: GLASSES
OD_CC: 20/50
OD_PH_CC: 20/40
OS_CC: J1 -1

## 2025-05-12 ASSESSMENT — CUP TO DISC RATIO
OS_RATIO: 0.3
OD_RATIO: 0.3

## 2025-05-12 ASSESSMENT — REFRACTION_WEARINGRX
OS_AXIS: 117
OD_CYLINDER: +1.50
OD_ADD: +2.75
OS_CYLINDER: +2.00
SPECS_TYPE: PAL
OD_SPHERE: -5.75
OD_AXIS: 065
OS_SPHERE: -5.25
OS_ADD: +2.75

## 2025-05-12 ASSESSMENT — CONF VISUAL FIELD
OS_SUPERIOR_NASAL_RESTRICTION: 0
OD_INFERIOR_NASAL_RESTRICTION: 0
OS_INFERIOR_TEMPORAL_RESTRICTION: 0
OD_SUPERIOR_TEMPORAL_RESTRICTION: 0
OS_SUPERIOR_TEMPORAL_RESTRICTION: 0
METHOD: COUNTING FINGERS
OD_NORMAL: 1
OS_INFERIOR_NASAL_RESTRICTION: 0
OS_NORMAL: 1
OD_SUPERIOR_NASAL_RESTRICTION: 0
OD_INFERIOR_TEMPORAL_RESTRICTION: 0

## 2025-05-12 ASSESSMENT — REFRACTION_MANIFEST
OS_CYLINDER: +1.25
OS_AXIS: 117
OD_AXIS: 065
OD_CYLINDER: +1.50
OD_ADD: +2.75
OS_ADD: +2.75
OD_SPHERE: -5.25
OS_SPHERE: -5.25

## 2025-05-12 ASSESSMENT — TONOMETRY
OD_IOP_MMHG: 17
IOP_METHOD: APPLANATION
OS_IOP_MMHG: 16

## 2025-05-12 ASSESSMENT — EXTERNAL EXAM - LEFT EYE: OS_EXAM: 2+ BROW PTOSIS, ROSACEA

## 2025-05-12 ASSESSMENT — EXTERNAL EXAM - RIGHT EYE: OD_EXAM: 2+ BROW PTOSIS, ROSACEA

## 2025-05-12 NOTE — PROGRESS NOTES
" Current Eye Medications:  None     Subjective:  Here annual diabetic eye exam today. Wears PALs full time. Sometimes takes glasses off to read the newspaper. He said vision is worsening for both distance and near with glasses on. Eyes are sometimes watery in the morning.     Lab Results   Component Value Date    A1C 7.0 12/13/2024    A1C 8.6 06/24/2024    A1C 8.5 12/19/2023    A1C 7.6 04/14/2023    A1C 7.1 10/24/2022    A1C 6.8 06/22/2021    A1C 5.8 09/21/2020    A1C 8.7 03/03/2020    A1C 7.8 08/27/2019    A1C 7.7 03/11/2019        Objective:  See Ophthalmology Exam.       Assessment:  Visually significant cataract both eyes in patient with diabetes.  Stable mild background diabetic retinopathy left eye without macular edema.      ICD-10-CM    1. Type 2 diabetes mellitus without complication, without long-term current use of insulin (H)  E11.9 EYE EXAM (SIMPLE-NONBILLABLE)      2. Combined forms of age-related cataract, mod, of both eyes  H25.813       3. Background diabetic retinopathy, mild, os  E11.3299 DC COMPUTERIZED OPHTHALMIC IMAGING RETINA      4. Rosacea  L71.9       5. Posterior vitreous detachment, bilateral  H43.813 DC COMPUTERIZED OPHTHALMIC IMAGING RETINA      6. Encounter for examination of eyes and vision with abnormal findings  Z01.01 EYE EXAM (SIMPLE-NONBILLABLE)     REFRACTIVE STATUS      7. Presbyopia  H52.4 REFRACTIVE STATUS           Plan:  Glasses Rx given - optional. don't recommend filling if you plan to proceed with cataract surgery in the near future     May use artificial tears up to four times a day (like Refresh Optive, Systane Balance, or TheraTears. Avoid \"get the red out\" drops and generic artifical tears).     Can schedule a cataract evaluation with Nico Contreras MD at any time if desired.    Man Bedolla M.D.  [phone]        "

## 2025-05-12 NOTE — PATIENT INSTRUCTIONS
"Glasses Rx given - optional. don't recommend filling if you plan to proceed with cataract surgery in the near future     May use artificial tears up to four times a day (like Refresh Optive, Systane Balance, or TheraTears. Avoid \"get the red out\" drops and generic artifical tears).     Can schedule a cataract evaluation with Nico Contreras MD at any time if desired.    Man Bedolla M.D.  [phone]    Patient Education   Diabetes weakens the blood vessels all over the body, including the eyes. Damage to the blood vessels in the eyes can cause swelling or bleeding into part of the eye (called the retina). This is called diabetic retinopathy (MEGAN-tin--puh-thee). If not treated, this disease can cause vision loss or blindness.   Symptoms may include blurred or distorted vision, but many people have no symptoms. It's important to see your eye doctor regularly to check for problems.   Early treatment and good control can help protect your vision. Here are the things you can do to help prevent vision loss:      1. Keep your blood sugar levels under tight control.      2. Bring high blood pressure under control.      3. No smoking.      4. Have yearly dilated eye exams.       "

## 2025-05-12 NOTE — Clinical Note
5/12/2025      Rick Sandhu  [address] [address] [facility] [address]      Dear Colleague,    Thank you for referring your patient, Rick Sandhu, to the [facility]. Please see a copy of my visit note below.     Current Eye Medications:  None     Subjective:  Here annual diabetic eye exam today. Wears PALs full time. Sometimes takes glasses off to read the newspaper. He said vision is worsening for both distance and near with glasses on. Eyes are sometimes watery in the morning.      Objective:  See Ophthalmology Exam.       Assessment:      Plan:   See Patient Instructions.          Again, thank you for allowing me to participate in the care of your patient.        Sincerely,        Man Bedolla MD    Electronically signed

## 2025-05-26 DIAGNOSIS — E78.5 HYPERLIPIDEMIA LDL GOAL <70: ICD-10-CM

## 2025-05-27 RX ORDER — ATORVASTATIN CALCIUM 10 MG/1
10 TABLET, FILM COATED ORAL DAILY
Qty: 90 TABLET | Refills: 1 | Status: SHIPPED | OUTPATIENT
Start: 2025-05-27

## 2025-06-22 ENCOUNTER — HEALTH MAINTENANCE LETTER (OUTPATIENT)
Age: 75
End: 2025-06-22

## 2025-07-10 DIAGNOSIS — I10 ESSENTIAL HYPERTENSION WITH GOAL BLOOD PRESSURE LESS THAN 140/90: ICD-10-CM

## 2025-07-10 RX ORDER — LISINOPRIL AND HYDROCHLOROTHIAZIDE 20; 25 MG/1; MG/1
1 TABLET ORAL DAILY
Qty: 90 TABLET | Refills: 0 | Status: SHIPPED | OUTPATIENT
Start: 2025-07-10

## 2025-07-19 DIAGNOSIS — E11.9 TYPE 2 DIABETES MELLITUS WITHOUT COMPLICATION, WITHOUT LONG-TERM CURRENT USE OF INSULIN (H): ICD-10-CM

## 2025-07-24 SDOH — HEALTH STABILITY: PHYSICAL HEALTH: ON AVERAGE, HOW MANY MINUTES DO YOU ENGAGE IN EXERCISE AT THIS LEVEL?: 60 MIN

## 2025-07-24 SDOH — HEALTH STABILITY: PHYSICAL HEALTH: ON AVERAGE, HOW MANY DAYS PER WEEK DO YOU ENGAGE IN MODERATE TO STRENUOUS EXERCISE (LIKE A BRISK WALK)?: 3 DAYS

## 2025-07-24 ASSESSMENT — SOCIAL DETERMINANTS OF HEALTH (SDOH): HOW OFTEN DO YOU GET TOGETHER WITH FRIENDS OR RELATIVES?: ONCE A WEEK

## 2025-07-29 ENCOUNTER — OFFICE VISIT (OUTPATIENT)
Dept: FAMILY MEDICINE | Facility: CLINIC | Age: 75
End: 2025-07-29
Attending: FAMILY MEDICINE
Payer: COMMERCIAL

## 2025-07-29 VITALS
BODY MASS INDEX: 28.83 KG/M2 | TEMPERATURE: 98.1 F | HEIGHT: 70 IN | SYSTOLIC BLOOD PRESSURE: 120 MMHG | RESPIRATION RATE: 16 BRPM | HEART RATE: 58 BPM | WEIGHT: 201.4 LBS | OXYGEN SATURATION: 97 % | DIASTOLIC BLOOD PRESSURE: 72 MMHG

## 2025-07-29 DIAGNOSIS — Z00.00 ENCOUNTER FOR MEDICARE ANNUAL WELLNESS EXAM: Primary | ICD-10-CM

## 2025-07-29 DIAGNOSIS — E11.9 TYPE 2 DIABETES MELLITUS WITHOUT COMPLICATION, WITHOUT LONG-TERM CURRENT USE OF INSULIN (H): ICD-10-CM

## 2025-07-29 DIAGNOSIS — I10 HYPERTENSION GOAL BP (BLOOD PRESSURE) < 140/90: ICD-10-CM

## 2025-07-29 DIAGNOSIS — E78.5 HYPERLIPIDEMIA LDL GOAL <70: ICD-10-CM

## 2025-07-29 LAB
EST. AVERAGE GLUCOSE BLD GHB EST-MCNC: 160 MG/DL
HBA1C MFR BLD: 7.2 % (ref 0–5.6)

## 2025-07-29 PROCEDURE — 99214 OFFICE O/P EST MOD 30 MIN: CPT | Mod: 25 | Performed by: FAMILY MEDICINE

## 2025-07-29 PROCEDURE — 36415 COLL VENOUS BLD VENIPUNCTURE: CPT | Performed by: FAMILY MEDICINE

## 2025-07-29 PROCEDURE — 1126F AMNT PAIN NOTED NONE PRSNT: CPT | Performed by: FAMILY MEDICINE

## 2025-07-29 PROCEDURE — 3074F SYST BP LT 130 MM HG: CPT | Performed by: FAMILY MEDICINE

## 2025-07-29 PROCEDURE — G0439 PPPS, SUBSEQ VISIT: HCPCS | Performed by: FAMILY MEDICINE

## 2025-07-29 PROCEDURE — 3078F DIAST BP <80 MM HG: CPT | Performed by: FAMILY MEDICINE

## 2025-07-29 PROCEDURE — 83036 HEMOGLOBIN GLYCOSYLATED A1C: CPT | Performed by: FAMILY MEDICINE

## 2025-07-29 PROCEDURE — 3051F HG A1C>EQUAL 7.0%<8.0%: CPT | Performed by: FAMILY MEDICINE

## 2025-07-29 RX ORDER — GLIPIZIDE 5 MG/1
5 TABLET, FILM COATED, EXTENDED RELEASE ORAL DAILY
Qty: 90 TABLET | Refills: 1 | Status: SHIPPED | OUTPATIENT
Start: 2025-07-29

## 2025-07-29 RX ORDER — LISINOPRIL AND HYDROCHLOROTHIAZIDE 20; 25 MG/1; MG/1
1 TABLET ORAL DAILY
Qty: 90 TABLET | Refills: 0 | Status: SHIPPED | OUTPATIENT
Start: 2025-07-29

## 2025-07-29 ASSESSMENT — PAIN SCALES - GENERAL: PAINLEVEL_OUTOF10: NO PAIN (0)

## 2025-07-29 NOTE — PATIENT INSTRUCTIONS
"Patient Education   Preventive Care Advice   This is general advice we often give to help people stay healthy. Your care team may have specific advice just for you. Please talk to your care team about your own preventive care needs.  Lifestyle  Exercise at least 150 minutes each week (30 minutes a day, 5 days a week).  Do muscle strengthening activities 2 days a week. These help control your weight and prevent disease.  No smoking.  Wear sunscreen to prevent skin cancer.  Take time with family and friends.  Have your home tested for radon every 2 to 5 years. Radon is a colorless, odorless gas that can harm your lungs. To learn more, go to www.health.[facility].us and search for \"Radon in Homes.\"  Keep guns unloaded and locked up in a safe place like a safe or gun vault, or, use a gun lock and hide the keys. Always lock away bullets separately. To learn more, visit [employer].[place].gov and search for \"safe gun storage.\"  Nutrition  Eat 5 or more servings of fruits and vegetables each day.  Try wheat bread, brown rice and whole grain pasta (instead of white bread, rice, and pasta).  Get enough calcium and vitamin D. Check the label on foods and aim for 100% of the RDA (recommended daily allowance).  Regular exams  Have a dental exam and cleaning every 6 months.  Older adults: Ask your care team how often to have memory testing.  See your health care team every year to talk about:  Any changes in your health.  Any medicines your care team has prescribed.  Preventive care, family planning, and ways to prevent chronic diseases.  Shots (vaccines)   HPV shots (up to age 26), if you've never had them before.  Hepatitis B shots (up to age 59), if you've never had them before.  COVID-19 shot: Get this shot when it's due.  Flu shot: Get a flu shot every year.  Tetanus shot: Get a tetanus shot every 10 years.  Pneumococcal, hepatitis A, and RSV shots: Ask your care team if you need these based on your risk.  Shingles shot (for age 50 and " up).  General health tests  Diabetes screening:  Starting at age 35, Get screened for diabetes at least every 3 years.  If you are younger than age 35, ask your care team if you should be screened for diabetes.  Cholesterol test: At age 39, start having a cholesterol test every 5 years, or more often if advised.  Bone density scan (DEXA): At age 50, ask your care team if you should have this scan for osteoporosis (brittle bones).  Hepatitis C: Get tested at least once in your life.  Abdominal aortic aneurysm screening: Talk to your doctor about having this screening if you:  Have ever smoked; and  Are biologically male; and  Are between the ages of 65 and 75.  STIs (sexually transmitted infections)  Before age 24: Ask your care team if you should be screened for STIs.  After age 24: Get screened for STIs if you're at risk. You are at risk for STIs (including HIV) if:  You are sexually active with more than one person.  You don't use condoms every time.  You or a partner was diagnosed with a sexually transmitted infection.  If you are at risk for HIV, ask about PrEP medicine to prevent HIV.  Get tested for HIV at least once in your life, whether you are at risk for HIV or not.  Cancer screening tests  Cervical cancer screening: If you have a cervix, begin getting regular cervical cancer screening tests at age 21. Most people who have regular screenings with normal results can stop after age 65. Talk about this with your provider.  Breast cancer scan (mammogram): If you've ever had breasts, begin having regular mammograms starting at age 40. This is a scan to check for breast cancer.  Colon cancer screening: It is important to start screening for colon cancer at age 45.  Have a colonoscopy test every 10 years (or more often if you're at risk) Or, ask your provider about stool tests like a FIT test every year or Cologuard test every 3 years.  To learn more about your testing options, visit:  www.[employer]/420256.pdf.  For help making a decision, visit: michael.aayush/nn15117.  Prostate cancer screening test: If you have a prostate and are age 55 to 69, ask your provider if you would benefit from a yearly prostate cancer screening test.  Lung cancer screening: If you are a current or former smoker age 50 to 80, ask your care team if ongoing lung cancer screenings are right for you.    For informational purposes only. Not to replace the advice of your health care provider. Copyright   2023 [facility] [employer]. All rights reserved. Clinically reviewed by the [facility] Transitions Program. [employer] 864556 - REV 6/25.  Learning About Activities of Daily Living  What are activities of daily living?     Activities of daily living (ADLs) are the basic self-care tasks you do every day. These include eating, bathing, dressing, and moving around.  As you age, and if you have health problems, you may find that it's harder to do some of these tasks. If so, your doctor can suggest ideas that may help.  To measure what kind of help you may need, your doctor will ask how well you are able to do ADLs. Let your doctor know if there are any tasks that you are having trouble doing. This is an important first step to getting help. And when you have the help you need, you can stay as independent as possible.  How will a doctor assess your ADLs?  Asking about ADLs is part of a routine health checkup your doctor will likely do as you age. Your health check might be done in a doctor's office, in your home, or at a hospital. The goal is to find out if you are having any problems that could make it hard to care for yourself or that make it unsafe for you to be on your own.  To measure your ADLs, your doctor will ask how hard it is for you to do routine tasks. Your doctor may also want to know if you have changed the way you do a task because of a health problem. Your doctor may watch how you:  Walk back and forth.  Keep  your balance while you stand or walk.  Move from sitting to standing or from a bed to a chair.  Button or unbutton a shirt or sweater.  Remove and put on your shoes.  It's common to feel a little worried or anxious if you find you can't do all the things you used to be able to do. Talking with your doctor about ADLs is a way to make sure you're as safe as possible and able to care for yourself as well as you can. You may want to bring a caregiver, friend, or family member to your checkup. They can help you talk to your doctor.  Follow-up care is a key part of your treatment and safety. Be sure to make and go to all appointments, and call your doctor if you are having problems. It's also a good idea to know your test results and keep a list of the medicines you take.  Current as of: October 24, 2024  Content Version: 14.5    7415-3848 [employer].   Care instructions adapted under license by your healthcare professional. If you have questions about a medical condition or this instruction, always ask your healthcare professional. [employer] disclaims any warranty or liability for your use of this information.

## 2025-07-29 NOTE — PROGRESS NOTES
"Preventive Care Visit  [facility] JASPER Pedraza Donn Muñoz MD, Family Medicine  Jul 29, 2025      Assessment & Plan     Encounter for Medicare annual wellness exam  Routine health maintenance otherwise up-to-date.  Has some questions about stroke prevention because a couple of family friends have had this.  So we discussed controlling risk factors as best as possible.  We also took a good look at skin and there is 1 small area left cheek with slightly rolled edges that I think is more consistent with sebaceous gland hyperplasia and with BCC.  But both are possible.  Patient says it has not changed in years so we will keep an eye on it for now.    Type 2 diabetes mellitus without complication, without long-term current use of insulin (H)  Has been well-controlled.  Recheck and adjust as needed  - glipiZIDE (GLUCOTROL XL) 5 MG 24 hr tablet; Take 1 tablet (5 mg) by mouth daily.  - Hemoglobin A1c; Future    Hypertension goal BP (blood pressure) < 140/90  Stable on current regimen.  Continue same plan and routine follow-up.     Hyperlipidemia LDL goal <70  Stable on current regimen.  Continue same plan and routine follow-up.       Patient has been advised of split billing requirements and indicates understanding: Yes    BMI  Estimated body mass index is 28.7 kg/m  as calculated from the following:    Height as of this encounter: 1.784 m (5' 10.24\").    Weight as of this encounter: 91.4 kg (201 lb 6.4 oz).       Counseling  Appropriate preventive services were addressed with this patient via screening, questionnaire, or discussion as appropriate for fall prevention, nutrition, physical activity, Tobacco-use cessation, social engagement, weight loss and cognition.  Checklist reviewing preventive services available has been given to the patient.  Reviewed patient's diet, addressing concerns and/or questions.   He is at risk for lack of exercise and has been provided with information to increase " physical activity for the benefit of his well-being.   Patient reported safety concerns were addressed today.Reviewed preventive health counseling, as reflected in patient instructions       Regular exercise       Healthy diet/nutrition       Vision screening       Hearing screening        Subjective   Epi is a 75 year old, presenting for the following:  Physical (Medicare Annual Wellness)        7/29/2025    11:38 AM   Additional Questions   Roomed by Jaymie PARR  Here today for routine wellness exam         Advance Care Planning            7/24/2025   General Health   How would you rate your overall physical health? Good   Feel stress (tense, anxious, or unable to sleep) Only a little   (!) STRESS CONCERN      7/24/2025   Nutrition   Diet: Regular (no restrictions)         7/24/2025   Exercise   Days per week of moderate/strenous exercise 3 days   Average minutes spent exercising at this level 60 min         7/24/2025   Social Factors   Frequency of gathering with friends or relatives Once a week   Worry food won't last until get money to buy more No   Food not last or not have enough money for food? No   Do you have housing? (Housing is defined as stable permanent housing and does not include staying outside in a car, in a tent, in an abandoned building, in an overnight shelter, or couch-surfing.) Yes   Are you worried about losing your housing? No   Lack of transportation? No   Unable to get utilities (heat,electricity)? No         7/24/2025   Fall Risk   Fallen 2 or more times in the past year? No   Trouble with walking or balance? No          7/24/2025   Activities of Daily Living- Home Safety   Needs help with the following daily activites None of the above   Safety concerns in the home No grab bars in the bathroom         7/24/2025   Dental   Dentist two times every year? Yes         7/24/2025   Hearing Screening   Hearing concerns? None of the above         7/24/2025   Driving Risk Screening    Patient/family members have concerns about driving No         2025   General Alertness/Fatigue Screening   Have you been more tired than usual lately? No         2025   Urinary Incontinence Screening   Bothered by leaking urine in past 6 months No         Today's PHQ-2 Score:       2025    11:37 AM   PHQ-2 (  [employer])   Q1: Little interest or pleasure in doing things 0   Q2: Feeling down, depressed or hopeless 0   PHQ-2 Score 0    Q1: Little interest or pleasure in doing things Not at all   Q2: Feeling down, depressed or hopeless Not at all   PHQ-2 Score 0       Patient-reported           2025   Substance Use   Alcohol more than 3/day or more than 7/wk No   Do you have a current opioid prescription? No   How severe/bad is pain from 1 to 10? 0/10 (No Pain)   Do you use any other substances recreationally? No     Social History     Tobacco Use    Smoking status: Former     Current packs/day: 0.00     Types: Cigarettes     Start date: 1964     Quit date: 8/15/1987     Years since quittin.9     Passive exposure: Past    Smokeless tobacco: Never   Vaping Use    Vaping status: Never Used   Substance Use Topics    Alcohol use: Yes     Alcohol/week: 0.0 standard drinks of alcohol     Comment: 1-2 drinks every other week    Drug use: No       ASCVD Risk   The 10-year ASCVD risk score (Sarah JAMES, et al., 2019) is: 43.1%    Values used to calculate the score:      Age: 75 years      Sex: Male      Is Non- : No      Diabetic: Yes      Tobacco smoker: No      Systolic Blood Pressure: 120 mmHg      Is BP treated: Yes      HDL Cholesterol: 46 mg/dL      Total Cholesterol: 139 mg/dL            Reviewed and updated as needed this visit by Provider   Tobacco  Allergies  Meds  Problems  Med Hx  Surg Hx  Fam Hx            Lab work is in process  Labs reviewed in EPIC  BP Readings from Last 3 Encounters:   25 120/72   25 (!) 156/88   25 134/78     Wt Readings from Last 3 Encounters:   07/29/25 91.4 kg (201 lb 6.4 oz)   02/21/25 91.3 kg (201 lb 6 oz)   01/20/25 91.4 kg (201 lb 6.4 oz)                  Current providers sharing in care for this patient include:  Patient Care Team:  Keila Muñoz MD as PCP - General (Family Practice)  eKila Muñoz MD as Assigned PCP  Mark Judge MD as MD (Hematology & Oncology)  Herminia De La Rosa AuD as Audiologist (Audiology)  Man Bedolla MD as MD (Ophthalmology)  Man Bedolla MD as Assigned Surgical Provider    The following health maintenance items are reviewed in Epic and correct as of today:  Health Maintenance   Topic Date Due    ZOSTER VACCINE (1 of 2) Never done    DIABETIC FOOT EXAM  10/01/2019    PNEUMOCOCCAL VACCINE 50+ YEARS (3 of 3 - PCV20 or PCV21) 02/22/2022    RSV VACCINE (1 - 1-dose 75+ series) Never done    COVID-19 VACCINE (4 - 2024-25 season) 09/01/2024    A1C  06/13/2025    ANNUAL REVIEW OF HM ORDERS  01/20/2026    DTAP/TDAP/TD VACCINE (1 - Tdap) 06/15/2027 (Originally 2/23/2017)    INFLUENZA VACCINE (1) 09/01/2025    CMP  12/13/2025    LIPID  12/13/2025    MICROALBUMIN  12/13/2025    EYE EXAM  05/12/2026    MEDICARE ANNUAL WELLNESS VISIT  07/29/2026    FALL RISK ASSESSMENT  07/29/2026    ADVANCE CARE PLANNING  06/25/2029    HEPATITIS C SCREENING  Completed    PHQ-2 (once per calendar year)  Completed    HPV VACCINE (No Doses Required) Completed    MENINGITIS VACCINE  Aged Out    BMP  Discontinued    COLORECTAL CANCER SCREENING  Discontinued         Review of Systems  CONSTITUTIONAL: NEGATIVE for fever, chills, change in weight  INTEGUMENTARY/SKIN: NEGATIVE for worrisome rashes, moles or lesions  EYES: NEGATIVE for vision changes or irritation  ENT/MOUTH: NEGATIVE for ear, mouth and throat problems  RESP: NEGATIVE for significant cough or SOB  BREAST: NEGATIVE for masses, tenderness or discharge  CV: NEGATIVE for chest pain, palpitations or peripheral edema  GI: NEGATIVE  "for nausea, abdominal pain, heartburn, or change in bowel habits  : NEGATIVE for frequency, dysuria, or hematuria  MUSCULOSKELETAL: NEGATIVE for significant arthralgias or myalgia  NEURO: NEGATIVE for weakness, dizziness or paresthesias  ENDOCRINE: NEGATIVE for temperature intolerance, skin/hair changes  HEME: NEGATIVE for bleeding problems  PSYCHIATRIC: NEGATIVE for changes in mood or affect     Objective    Exam  /72 (BP Location: Right arm, Patient Position: Sitting, Cuff Size: Adult Large)   Pulse 58   Temp 98.1  F (36.7  C) (Temporal)   Resp 16   Ht 1.784 m (5' 10.24\")   Wt 91.4 kg (201 lb 6.4 oz)   SpO2 97%   BMI 28.70 kg/m     Estimated body mass index is 28.7 kg/m  as calculated from the following:    Height as of this encounter: 1.784 m (5' 10.24\").    Weight as of this encounter: 91.4 kg (201 lb 6.4 oz).    Physical Exam  GENERAL: alert and no distress  EYES: Eyes grossly normal to inspection, PERRL and conjunctivae and sclerae normal  HENT: ear canals and TM's normal, nose and mouth without ulcers or lesions  NECK: no adenopathy, no asymmetry, masses, or scars  RESP: lungs clear to auscultation - no rales, rhonchi or wheezes  CV: regular rate and rhythm, normal S1 S2, no S3 or S4, no murmur, click or rub, no peripheral edema  ABDOMEN: soft, nontender, no hepatosplenomegaly, no masses and bowel sounds normal  MS: no gross musculoskeletal defects noted, no edema  SKIN: no suspicious lesions or rashes  NEURO: Normal strength and tone, mentation intact and speech normal  PSYCH: mentation appears normal, affect normal/bright        7/29/2025   Mini Cog   Clock Draw Score 2 Normal   3 Item Recall 3 objects recalled   Mini Cog Total Score 5              Signed Electronically by: Keila Muñoz MD    "

## 2025-07-30 ENCOUNTER — OFFICE VISIT (OUTPATIENT)
Dept: OPHTHALMOLOGY | Facility: CLINIC | Age: 75
End: 2025-07-30
Payer: COMMERCIAL

## 2025-07-30 DIAGNOSIS — E11.3299 BACKGROUND DIABETIC RETINOPATHY (H): ICD-10-CM

## 2025-07-30 DIAGNOSIS — H25.813 COMBINED FORMS OF AGE-RELATED CATARACT OF BOTH EYES: Primary | ICD-10-CM

## 2025-07-30 DIAGNOSIS — L71.9 ROSACEA: ICD-10-CM

## 2025-07-30 DIAGNOSIS — H43.813 POSTERIOR VITREOUS DETACHMENT, BILATERAL: ICD-10-CM

## 2025-07-30 PROCEDURE — 92014 COMPRE OPH EXAM EST PT 1/>: CPT | Performed by: STUDENT IN AN ORGANIZED HEALTH CARE EDUCATION/TRAINING PROGRAM

## 2025-07-30 PROCEDURE — 2022F DILAT RTA XM EVC RTNOPTHY: CPT | Performed by: STUDENT IN AN ORGANIZED HEALTH CARE EDUCATION/TRAINING PROGRAM

## 2025-07-30 ASSESSMENT — EXTERNAL EXAM - RIGHT EYE: OD_EXAM: 2+ BROW PTOSIS, ROSACEA

## 2025-07-30 ASSESSMENT — VISUAL ACUITY
OS_CC+: -2
OD_CC+: +2
CORRECTION_TYPE: GLASSES
METHOD: SNELLEN - LINEAR
OS_CC: 20/40
OD_CC: 20/50

## 2025-07-30 ASSESSMENT — REFRACTION_WEARINGRX
OS_ADD: +2.75
OS_CYLINDER: +2.00
OD_ADD: +2.75
OS_AXIS: 130
SPECS_TYPE: PAL
OD_SPHERE: -5.75
OD_CYLINDER: +1.50
OS_SPHERE: -5.00
OD_AXIS: 065

## 2025-07-30 ASSESSMENT — REFRACTION_MANIFEST
OD_AXIS: 040
OS_AXIS: 095
OS_SPHERE: -5.50
OD_SPHERE: -6.50
OS_CYLINDER: +1.50
OD_CYLINDER: +1.25

## 2025-07-30 ASSESSMENT — TONOMETRY
OD_IOP_MMHG: 16
OS_IOP_MMHG: 16
IOP_METHOD: APPLANATION

## 2025-07-30 ASSESSMENT — CUP TO DISC RATIO
OS_RATIO: 0.3
OD_RATIO: 0.3

## 2025-07-30 ASSESSMENT — EXTERNAL EXAM - LEFT EYE: OS_EXAM: 2+ BROW PTOSIS, ROSACEA

## 2025-07-30 NOTE — PROGRESS NOTES
Current Eye Medications:  none     Subjective: referral for cataract eval from Dr. Bedolla, both eyes are more blurred in the distance and up close.  Having trouble driving at night as well.   Hemoglobin A1C   Date Value Ref Range Status   07/29/2025 7.2 (H) 0.0 - 5.6 % Final     Comment:     Normal <5.7%   Prediabetes 5.7-6.4%    Diabetes 6.5% or higher     Note: Adopted from ADA consensus guidelines.   06/22/2021 6.8 (H) 0 - 5.6 % Final     Comment:     Normal <5.7% Prediabetes 5.7-6.4%  Diabetes 6.5% or higher - adopted from ADA   consensus guidelines.        No previous eye injuries or surgeries.      Objective:  See Ophthalmology Exam.       Assessment:  Rick Sandhu is a 75 year old male who presents with:   Encounter Diagnoses   Name Primary?    Combined forms of age-related cataract, mod, of both eyes Visually significant cataracts both eyes. Recommend CE/IOL right eye then left eye. Dil 5.5. Flomax. Anticipate targeting reading distance or mostly distance both eyes - discuss at pre-op.     Background diabetic retinopathy, mild, os     Rosacea     Posterior vitreous detachment, bilateral        Plan:  Offered cataract surgery of the right eye then left eye at [facility]      Our surgery scheduler, Opal, will call you next week to discuss surgical dates. Just in case, her number is [phone]. She will then make and mail your appointments to you.    Nico Contreras MD  [phone]

## 2025-07-30 NOTE — PATIENT INSTRUCTIONS
Offered cataract surgery of the right eye then left eye at [facility]      Our surgery scheduler, Opal, will call you next week to discuss surgical dates. Just in case, her number is [phone]. She will then make and mail your appointments to you.    Nico Contreras MD  [phone]

## 2025-07-30 NOTE — LETTER
7/30/2025      Rick Sandhu  [address] [address]      Dear Colleague,    Thank you for referring your patient, Rick Sandhu, to the [facility]. Please see a copy of my visit note below.     Current Eye Medications:  none     Subjective: referral for cataract eval from Dr. Bedolla, both eyes are more blurred in the distance and up close.  Having trouble driving at night as well.   Hemoglobin A1C   Date Value Ref Range Status   07/29/2025 7.2 (H) 0.0 - 5.6 % Final     Comment:     Normal <5.7%   Prediabetes 5.7-6.4%    Diabetes 6.5% or higher     Note: Adopted from ADA consensus guidelines.   06/22/2021 6.8 (H) 0 - 5.6 % Final     Comment:     Normal <5.7% Prediabetes 5.7-6.4%  Diabetes 6.5% or higher - adopted from ADA   consensus guidelines.        No previous eye injuries or surgeries.      Objective:  See Ophthalmology Exam.       Assessment:  Rick Sandhu is a 75 year old male who presents with:   Encounter Diagnoses   Name Primary?     Combined forms of age-related cataract, mod, of both eyes Visually significant cataracts both eyes. Recommend CE/IOL right eye then left eye. Dil 5.5. Flomax. Anticipate targeting reading distance or mostly distance both eyes - discuss at pre-op.      Background diabetic retinopathy, mild, os      Rosacea      Posterior vitreous detachment, bilateral        Plan:  Offered cataract surgery of the right eye then left eye at [facility]      Our surgery scheduler, Opal, will call you next week to discuss surgical dates. Just in case, her number is [phone]. She will then make and mail your appointments to you.    Nico Contreras MD  [phone]         Again, thank you for allowing me to participate in the care of your patient.        Sincerely,        Nico Contreras MD    Electronically signed

## 2025-08-12 DIAGNOSIS — E11.9 TYPE 2 DIABETES MELLITUS WITHOUT COMPLICATION, WITHOUT LONG-TERM CURRENT USE OF INSULIN (H): ICD-10-CM

## 2025-09-02 ENCOUNTER — PREP FOR PROCEDURE (OUTPATIENT)
Dept: OPHTHALMOLOGY | Facility: CLINIC | Age: 75
End: 2025-09-02
Payer: COMMERCIAL

## 2025-09-02 ENCOUNTER — TELEPHONE (OUTPATIENT)
Dept: OPHTHALMOLOGY | Facility: CLINIC | Age: 75
End: 2025-09-02
Payer: COMMERCIAL

## 2025-09-02 DIAGNOSIS — H25.812 COMBINED FORM OF AGE-RELATED CATARACT, LEFT EYE: ICD-10-CM

## 2025-09-02 DIAGNOSIS — H25.811 COMBINED FORM OF AGE-RELATED CATARACT, RIGHT EYE: Primary | ICD-10-CM

## (undated) DEVICE — ENDO BITE BLOCK ADULT OMNI-BLOC

## (undated) DEVICE — KIT ENDO FIRST STEP DISINFECTANT 200ML W/POUCH EP-4

## (undated) DEVICE — PACK ENDOSCOPY GI CUSTOM UMMC

## (undated) DEVICE — SPECIMEN CONTAINER 3OZ W/FORMALIN 59901

## (undated) DEVICE — ENDO TUBING CO2 SMARTCAP STERILE DISP 100145CO2EXT

## (undated) DEVICE — SOL WATER IRRIG 1000ML BOTTLE 2F7114

## (undated) DEVICE — PAD CHUX UNDERPAD 23X24" 7136

## (undated) DEVICE — ENDO NDL ASPIRATION ULTRASOUND 25GA ACQUIRE M00555560

## (undated) DEVICE — SUCTION MANIFOLD NEPTUNE 2 SYS 4 PORT 0702-020-000

## (undated) DEVICE — ENDO NDL ASPIRATION ULTRASOUND 22GA ACQUIRE M00555540

## (undated) RX ORDER — PROPOFOL 10 MG/ML
INJECTION, EMULSION INTRAVENOUS
Status: DISPENSED
Start: 2023-06-29

## (undated) RX ORDER — GLYCOPYRROLATE 0.2 MG/ML
INJECTION, SOLUTION INTRAMUSCULAR; INTRAVENOUS
Status: DISPENSED
Start: 2023-06-29

## (undated) RX ORDER — DEXAMETHASONE SODIUM PHOSPHATE 4 MG/ML
INJECTION, SOLUTION INTRA-ARTICULAR; INTRALESIONAL; INTRAMUSCULAR; INTRAVENOUS; SOFT TISSUE
Status: DISPENSED
Start: 2023-06-29

## (undated) RX ORDER — ESMOLOL HYDROCHLORIDE 10 MG/ML
INJECTION INTRAVENOUS
Status: DISPENSED
Start: 2023-06-29

## (undated) RX ORDER — FENTANYL CITRATE-0.9 % NACL/PF 10 MCG/ML
PLASTIC BAG, INJECTION (ML) INTRAVENOUS
Status: DISPENSED
Start: 2023-06-29

## (undated) RX ORDER — ONDANSETRON 2 MG/ML
INJECTION INTRAMUSCULAR; INTRAVENOUS
Status: DISPENSED
Start: 2023-06-29

## (undated) RX ORDER — EPHEDRINE SULFATE 50 MG/ML
INJECTION, SOLUTION INTRAMUSCULAR; INTRAVENOUS; SUBCUTANEOUS
Status: DISPENSED
Start: 2023-06-29